# Patient Record
Sex: MALE | Race: WHITE | NOT HISPANIC OR LATINO | Employment: FULL TIME | URBAN - METROPOLITAN AREA
[De-identification: names, ages, dates, MRNs, and addresses within clinical notes are randomized per-mention and may not be internally consistent; named-entity substitution may affect disease eponyms.]

---

## 2017-02-13 DIAGNOSIS — E11.9 NEW ONSET TYPE 2 DIABETES MELLITUS (H): ICD-10-CM

## 2017-02-13 NOTE — TELEPHONE ENCOUNTER
Metformin         Last Written Prescription Date: 10/18/16  Last Fill Quantity: 120, # refills: 3  Last Office Visit with [facility], [facility] or [facility] prescribing provider:  10/19/16        BP Readings from Last 3 Encounters:   10/19/16 130/72   09/06/16 147/87   09/01/16 132/80     No results found for: MICROL  No results found for: MICROALBUMIN  Creatinine   Date Value Ref Range Status   10/19/2016 0.98 0.66 - 1.25 mg/dL Final   ]  GFR Estimate   Date Value Ref Range Status   10/19/2016 86 >60 mL/min/1.7m2 Final     Comment:     Non  GFR Calc   08/23/2016 >90  Non  GFR Calc   >60 mL/min/1.7m2 Final   08/23/2016 >90  Non  GFR Calc   >60 mL/min/1.7m2 Final     GFR Estimate If Black   Date Value Ref Range Status   10/19/2016 >90   GFR Calc   >60 mL/min/1.7m2 Final   08/23/2016 >90   GFR Calc   >60 mL/min/1.7m2 Final   08/23/2016 >90   GFR Calc   >60 mL/min/1.7m2 Final     Lab Results   Component Value Date    CHOL 223 10/19/2016     Lab Results   Component Value Date    HDL 40 10/19/2016     Lab Results   Component Value Date     10/19/2016     Lab Results   Component Value Date    TRIG 245 10/19/2016     Lab Results   Component Value Date    CHOLHDLRATIO 6.0 04/08/2008     No results found for: AST  No results found for: ALT  Lab Results   Component Value Date    A1C 6.0 10/19/2016    A1C 11.6 08/16/2016     Potassium   Date Value Ref Range Status   10/19/2016 4.0 3.4 - 5.3 mmol/L Final     Alexandria Aaron MA 2/13/2017

## 2017-02-16 RX ORDER — METFORMIN HCL 500 MG
2000 TABLET, EXTENDED RELEASE 24 HR ORAL
Qty: 120 TABLET | Refills: 2 | Status: SHIPPED
Start: 2017-02-16 | End: 2017-08-15

## 2017-02-16 NOTE — TELEPHONE ENCOUNTER
Prescription approved per [facility] Refill Protocol.. Due for erika and Lipid, Hgb A1C in April. Notified per comment section of RX. Future labs ordered..................GERBER Calvillo

## 2017-03-07 ENCOUNTER — TRANSFERRED RECORDS (OUTPATIENT)
Dept: HEALTH INFORMATION MANAGEMENT | Facility: CLINIC | Age: 37
End: 2017-03-07

## 2017-08-15 DIAGNOSIS — E11.9 NEW ONSET TYPE 2 DIABETES MELLITUS (H): ICD-10-CM

## 2017-08-15 NOTE — TELEPHONE ENCOUNTER
Metformin         Last Written Prescription Date: 2/16/17  Last Fill Quantity: 120, # refills: 2  Last Office Visit with [facility], [facility] or [facility] prescribing provider:  10/19/16        BP Readings from Last 3 Encounters:   10/19/16 130/72   09/06/16 147/87   09/01/16 132/80     No results found for: MICROL  No results found for: UMALCR  Creatinine   Date Value Ref Range Status   10/19/2016 0.98 0.66 - 1.25 mg/dL Final   ]  GFR Estimate   Date Value Ref Range Status   10/19/2016 86 >60 mL/min/1.7m2 Final     Comment:     Non  GFR Calc   08/23/2016 >90  Non  GFR Calc   >60 mL/min/1.7m2 Final   08/23/2016 >90  Non  GFR Calc   >60 mL/min/1.7m2 Final     GFR Estimate If Black   Date Value Ref Range Status   10/19/2016 >90   GFR Calc   >60 mL/min/1.7m2 Final   08/23/2016 >90   GFR Calc   >60 mL/min/1.7m2 Final   08/23/2016 >90   GFR Calc   >60 mL/min/1.7m2 Final     Lab Results   Component Value Date    CHOL 223 10/19/2016     Lab Results   Component Value Date    HDL 40 10/19/2016     Lab Results   Component Value Date     10/19/2016     Lab Results   Component Value Date    TRIG 245 10/19/2016     Lab Results   Component Value Date    CHOLHDLRATIO 6.0 04/08/2008     No results found for: AST  No results found for: ALT  Lab Results   Component Value Date    A1C 6.0 10/19/2016    A1C 11.6 08/16/2016     Potassium   Date Value Ref Range Status   10/19/2016 4.0 3.4 - 5.3 mmol/L Final     Alexandria Aaron MA 8/15/2017

## 2017-08-17 RX ORDER — METFORMIN HCL 500 MG
2000 TABLET, EXTENDED RELEASE 24 HR ORAL
Qty: 120 TABLET | Refills: 2 | Status: SHIPPED | OUTPATIENT
Start: 2017-08-17 | End: 2017-11-14

## 2017-08-17 NOTE — TELEPHONE ENCOUNTER
Prescription approved per [facility] Refill Protocol  Notified needs appt and labs   Future orders are in placed for Hgb A1C, Micro-albumin, TSH, Lipid, Creatinine. .................GERBER Calvillo

## 2018-05-11 ENCOUNTER — OFFICE VISIT (OUTPATIENT)
Dept: FAMILY MEDICINE | Facility: CLINIC | Age: 38
End: 2018-05-11
Payer: COMMERCIAL

## 2018-05-11 VITALS
BODY MASS INDEX: 40.43 KG/M2 | DIASTOLIC BLOOD PRESSURE: 86 MMHG | HEIGHT: 74 IN | RESPIRATION RATE: 16 BRPM | SYSTOLIC BLOOD PRESSURE: 136 MMHG | HEART RATE: 74 BPM | OXYGEN SATURATION: 99 % | WEIGHT: 315 LBS

## 2018-05-11 DIAGNOSIS — E11.9 TYPE 2 DIABETES MELLITUS WITHOUT COMPLICATION, WITHOUT LONG-TERM CURRENT USE OF INSULIN (H): ICD-10-CM

## 2018-05-11 DIAGNOSIS — E66.01 MORBID OBESITY DUE TO EXCESS CALORIES (H): ICD-10-CM

## 2018-05-11 DIAGNOSIS — E78.5 HYPERLIPIDEMIA LDL GOAL <100: ICD-10-CM

## 2018-05-11 DIAGNOSIS — R03.0 ELEVATED BLOOD PRESSURE READING WITHOUT DIAGNOSIS OF HYPERTENSION: Primary | ICD-10-CM

## 2018-05-11 LAB
ANION GAP SERPL CALCULATED.3IONS-SCNC: 9 MMOL/L (ref 3–14)
BUN SERPL-MCNC: 17 MG/DL (ref 7–30)
CALCIUM SERPL-MCNC: 9.2 MG/DL (ref 8.5–10.1)
CHLORIDE SERPL-SCNC: 105 MMOL/L (ref 94–109)
CO2 SERPL-SCNC: 23 MMOL/L (ref 20–32)
CREAT SERPL-MCNC: 0.98 MG/DL (ref 0.66–1.25)
CREAT UR-MCNC: 149 MG/DL
GFR SERPL CREATININE-BSD FRML MDRD: 85 ML/MIN/1.7M2
GLUCOSE BLD-MCNC: 202 MG/DL (ref 70–99)
GLUCOSE SERPL-MCNC: 226 MG/DL (ref 70–99)
HBA1C MFR BLD: 9.4 % (ref 0–5.6)
MICROALBUMIN UR-MCNC: 296 MG/L
MICROALBUMIN/CREAT UR: 198.66 MG/G CR (ref 0–17)
POTASSIUM SERPL-SCNC: 4.2 MMOL/L (ref 3.4–5.3)
SODIUM SERPL-SCNC: 137 MMOL/L (ref 133–144)
TSH SERPL DL<=0.005 MIU/L-ACNC: 1.97 MU/L (ref 0.4–4)

## 2018-05-11 PROCEDURE — 99207 C FOOT EXAM  NO CHARGE: CPT | Performed by: PHYSICIAN ASSISTANT

## 2018-05-11 PROCEDURE — 83036 HEMOGLOBIN GLYCOSYLATED A1C: CPT | Performed by: PHYSICIAN ASSISTANT

## 2018-05-11 PROCEDURE — 36415 COLL VENOUS BLD VENIPUNCTURE: CPT | Performed by: PHYSICIAN ASSISTANT

## 2018-05-11 PROCEDURE — 80048 BASIC METABOLIC PNL TOTAL CA: CPT | Performed by: PHYSICIAN ASSISTANT

## 2018-05-11 PROCEDURE — 99214 OFFICE O/P EST MOD 30 MIN: CPT | Performed by: PHYSICIAN ASSISTANT

## 2018-05-11 PROCEDURE — 82947 ASSAY GLUCOSE BLOOD QUANT: CPT | Mod: 59 | Performed by: PHYSICIAN ASSISTANT

## 2018-05-11 PROCEDURE — 82043 UR ALBUMIN QUANTITATIVE: CPT | Performed by: PHYSICIAN ASSISTANT

## 2018-05-11 PROCEDURE — 84443 ASSAY THYROID STIM HORMONE: CPT | Performed by: PHYSICIAN ASSISTANT

## 2018-05-11 RX ORDER — METFORMIN HCL 500 MG
TABLET, EXTENDED RELEASE 24 HR ORAL
Qty: 360 TABLET | Refills: 1 | Status: SHIPPED | OUTPATIENT
Start: 2018-05-11 | End: 2018-11-01

## 2018-05-11 RX ORDER — ATORVASTATIN CALCIUM 20 MG/1
20 TABLET, FILM COATED ORAL DAILY
Qty: 90 TABLET | Refills: 1 | Status: SHIPPED | OUTPATIENT
Start: 2018-05-11 | End: 2018-11-01

## 2018-05-11 RX ORDER — LISINOPRIL 10 MG/1
10 TABLET ORAL DAILY
Qty: 30 TABLET | Refills: 1 | Status: SHIPPED | OUTPATIENT
Start: 2018-05-11 | End: 2018-07-12

## 2018-05-11 NOTE — LETTER
May 14, 2018      Geoffrey Wilson  [address] [address]        Dear ,    We are writing to inform you of your test results.    Your kidney function remains stable. Your thyroid function is normal. Make the changes/additions that we discussed and lets get together again in 3 mos.     Resulted Orders   Basic metabolic panel  (Ca, Cl, CO2, Creat, Gluc, K, Na, BUN)   Result Value Ref Range    Sodium 137 133 - 144 mmol/L    Potassium 4.2 3.4 - 5.3 mmol/L    Chloride 105 94 - 109 mmol/L    Carbon Dioxide 23 20 - 32 mmol/L    Anion Gap 9 3 - 14 mmol/L    Glucose 226 (H) 70 - 99 mg/dL      Comment:      Non Fasting    Urea Nitrogen 17 7 - 30 mg/dL    Creatinine 0.98 0.66 - 1.25 mg/dL    GFR Estimate 85 >60 mL/min/1.7m2      Comment:      Non  GFR Calc    GFR Estimate If Black >90 >60 mL/min/1.7m2      Comment:       GFR Calc    Calcium 9.2 8.5 - 10.1 mg/dL   Hemoglobin A1c   Result Value Ref Range    Hemoglobin A1C 9.4 (H) 0 - 5.6 %      Comment:      Normal <5.7% Prediabetes 5.7-6.4%  Diabetes 6.5% or higher - adopted from ADA   consensus guidelines.     Albumin Random Urine Quantitative with Creat Ratio   Result Value Ref Range    Creatinine Urine 149 mg/dL    Albumin Urine mg/L 296 mg/L    Albumin Urine mg/g Cr 198.66 (H) 0 - 17 mg/g Cr   TSH   Result Value Ref Range    TSH 1.97 0.40 - 4.00 mU/L   Glucose, whole blood   Result Value Ref Range    Glucose Whole Blood 202 (H) 70 - 99 mg/dL       If you have any questions or concerns, please call the clinic at the number listed above.       Sincerely,        David Deutsch PA-C/marcelina

## 2018-05-11 NOTE — PROGRESS NOTES
SUBJECTIVE:   Geoffrey Wilson is a 37 year old male who presents to clinic today for the following health issues:      Diabetes Follow-up      Patient is checking blood sugars: not at all    Diabetic concerns: other - stopped metformin January 2018     Symptoms of hypoglycemia (low blood sugar): none     Paresthesias (numbness or burning in feet) or sores: No     Date of last diabetic eye exam: 2018  Has been without metformin x 5 mos.   Hyperlipidemia Follow-Up      Rate your low fat/cholesterol diet?: not monitoring fat    Taking statin?  No    Other lipid medications/supplements?:  none    Hypertension Follow-up      Outpatient blood pressures are not being checked.    Low Salt Diet: not monitoring salt    BP Readings from Last 2 Encounters:   05/11/18 136/86   10/19/16 130/72     Hemoglobin A1C (%)   Date Value   10/19/2016 6.0   08/16/2016 11.6 (H)     LDL Cholesterol Calculated (mg/dL)   Date Value   10/19/2016 134 (H)   04/08/2008 130 (H)       Amount of exercise or physical activity: None    Problems taking medications regularly: yes stopped metformin January 2018    Medication side effects: not applicable    Diet: regular (no restrictions)            Problem list and histories reviewed & adjusted, as indicated.  Additional history: as documented        Reviewed and updated as needed this visit by clinical staff  Tobacco  Allergies  Meds  Soc Hx      Reviewed and updated as needed this visit by Provider         All other systems negative except as outline above  OBJECTIVE:  Eye exam - right eye normal lid, conjunctiva, cornea, pupil and fundus, left eye normal lid, conjunctiva, cornea, pupil and fundus.  Thyroid not palpable, not enlarged, no nodules detected.  CHEST:chest clear to IPPA, no tachypnea, retractions or cyanosis and S1, S2 normal, no murmur, no gallop, rate regular.  Foot exam - both sides normal; no swelling, tenderness or skin or vascular lesions. Color and temperature is normal. Sensation  is intact. Peripheral pulses are palpable. Toenails are normal.    Geoffrey was seen today for diabetes.    Diagnoses and all orders for this visit:    Elevated blood pressure reading without diagnosis of hypertension  -     Basic metabolic panel  (Ca, Cl, CO2, Creat, Gluc, K, Na, BUN)  -     lisinopril (PRINIVIL/ZESTRIL) 10 MG tablet; Take 1 tablet (10 mg) by mouth daily    Morbid obesity due to excess calories (H)    Hyperlipidemia LDL goal <100  -     atorvastatin (LIPITOR) 20 MG tablet; Take 1 tablet (20 mg) by mouth daily    Type 2 diabetes mellitus without complication, without long-term current use of insulin (H)  -     atorvastatin (LIPITOR) 20 MG tablet; Take 1 tablet (20 mg) by mouth daily    Other orders  -     Hemoglobin A1c  -     Albumin Random Urine Quantitative with Creat Ratio  -     FOOT EXAM  -     TSH  -     Glucose, whole blood  -     metFORMIN (GLUCOPHAGE-XR) 500 MG 24 hr tablet; TAKE 4 TABLETS BY MOUTH DAILY WITH DINNER      work on lifestyle modification  Recheck in 3 mos

## 2018-05-11 NOTE — MR AVS SNAPSHOT
"              After Visit Summary   2018    Geoffrey Wilson    MRN: [id number]           Patient Information     Date Of Birth          1980        Visit Information        Provider Department      2018 4:20 PM David Deutsch PA-C [facility]        Today's Diagnoses     Elevated blood pressure reading without diagnosis of hypertension    -  1    Morbid obesity due to excess calories (H)        Hyperlipidemia LDL goal <100        Type 2 diabetes mellitus without complication, without long-term current use of insulin (H)           Follow-ups after your visit        Who to contact     Normal or non-critical lab and imaging results will be communicated to you by [employer], letter or phone within 4 business days after the clinic has received the results. If you do not hear from us within 7 days, please contact the clinic through [facility] or phone. If you have a critical or abnormal lab result, we will notify you by phone as soon as possible.  Submit refill requests through [email or web address] or call your pharmacy and they will forward the refill request to us. Please allow 3 business days for your refill to be completed.          If you need to speak with a  for additional information , please call: [phone]             Additional Information About Your Visit        [email or web address] Information     [email or web address] lets you send messages to your doctor, view your test results, renew your prescriptions, schedule appointments and more. To sign up, go to [email or web address] . Click on \"Log in\" on the left side of the screen, which will take you to the Welcome page. Then click on \"Sign up Now\" on the right side of the page.     You will be asked to enter the access code listed below, as well as some personal information. Please follow the directions to create your username and password.     Your access code is: [email or web address]  Expires: 2018  4:56 PM     Your access code will  in 90 days. If " "you need help or a new code, please call your [facility] clinic or [phone].        Care EveryWhere ID     This is your Care EveryWhere ID. This could be used by other organizations to access your [facility] medical records  [id number]        Your Vitals Were     Pulse Respirations Height Pulse Oximetry BMI (Body Mass Index)       74 16 6' 2\" (1.88 m) 99% 44.94 kg/m2        Blood Pressure from Last 3 Encounters:   05/11/18 136/86   10/19/16 130/72   09/06/16 147/87    Weight from Last 3 Encounters:   05/11/18 (!) 350 lb (158.8 kg)   10/19/16 (!) 335 lb (152 kg)   09/01/16 (!) 332 lb 4.8 oz (150.7 kg)              We Performed the Following     Albumin Random Urine Quantitative with Creat Ratio     Basic metabolic panel  (Ca, Cl, CO2, Creat, Gluc, K, Na, BUN)     FOOT EXAM     Glucose, whole blood     Hemoglobin A1c     TSH          Today's Medication Changes          These changes are accurate as of 5/11/18  4:56 PM.  If you have any questions, ask your nurse or doctor.               Start taking these medicines.        Dose/Directions    atorvastatin 20 MG tablet   Commonly known as:  LIPITOR   Used for:  Hyperlipidemia LDL goal <100, Type 2 diabetes mellitus without complication, without long-term current use of insulin (H)   Started by:  David Deutsch PA-C        Dose:  20 mg   Take 1 tablet (20 mg) by mouth daily   Quantity:  90 tablet   Refills:  1       lisinopril 10 MG tablet   Commonly known as:  PRINIVIL/ZESTRIL   Used for:  Elevated blood pressure reading without diagnosis of hypertension   Started by:  David Deutsch PA-C        Dose:  10 mg   Take 1 tablet (10 mg) by mouth daily   Quantity:  30 tablet   Refills:  1         These medicines have changed or have updated prescriptions.        Dose/Directions    metFORMIN 500 MG 24 hr tablet   Commonly known as:  GLUCOPHAGE-XR   This may have changed:  See the new instructions.   Changed by:  David Deutsch PA-C        TAKE 4 TABLETS BY MOUTH " DAILY WITH DINNER   Quantity:  360 tablet   Refills:  1         Stop taking these medicines if you haven't already. Please contact your care team if you have questions.     glipiZIDE 5 MG 24 hr tablet   Commonly known as:  glipiZIDE XL   Stopped by:  David Deutsch PA-C           glipiZIDE 5 MG tablet   Commonly known as:  GLUCOTROL   Stopped by:  David Deutsch PA-C                Where to get your medicines      These medications were sent to [facility] [id number] [place] [address] [address] [facility] [address] [facility] [address]  [address] [address] [address]     Phone:  [phone]     atorvastatin 20 MG tablet    lisinopril 10 MG tablet    metFORMIN 500 MG 24 hr tablet                Primary Care Provider Office Phone # Fax #    [facility] [phone] [address] [address]        Equal Access to Services     KAREN MEDRANO : Hadii alison ku hadasho Soomaali, waaxda luqadaha, qaybta kaalmada adeegyada, waxay idiin hayaan albert alarcon . So [facility] [phone].    ATENCIÓN: Si habla español, tiene a morales disposición servicios gratuitos de asistencia lingüística. Llame al [phone].    We comply with applicable federal civil rights laws and [place] laws. We do not discriminate on the basis of race, color, national origin, age, disability, sex, sexual orientation, or gender identity.            Thank you!     Thank you for choosing [facility]  for your care. Our goal is always to provide you with excellent care. Hearing back from our patients is one way we can continue to improve our services. Please take a few minutes to complete the written survey that you may receive in the mail after your visit with us. Thank you!             Your Updated Medication List - Protect others around you: Learn how to safely use, store and throw away your medicines at www.disposemymeds.org.          This list is accurate as of 5/11/18  4:56  PM.  Always use your most recent med list.                   Brand Name Dispense Instructions for use Diagnosis    atorvastatin 20 MG tablet    LIPITOR    90 tablet    Take 1 tablet (20 mg) by mouth daily    Hyperlipidemia LDL goal <100, Type 2 diabetes mellitus without complication, without long-term current use of insulin (H)       blood glucose lancets standard    no brand specified    2 Box    Use to test blood sugar 1 times daily or as needed if symptomatic.    New onset type 2 diabetes mellitus (H)       blood glucose monitoring test strip    no brand specified    50 each    Use to test blood sugars 4 times daily and as needed if symptomatic    New onset type 2 diabetes mellitus (H)       lisinopril 10 MG tablet    PRINIVIL/ZESTRIL    30 tablet    Take 1 tablet (10 mg) by mouth daily    Elevated blood pressure reading without diagnosis of hypertension       metFORMIN 500 MG 24 hr tablet    GLUCOPHAGE-XR    360 tablet    TAKE 4 TABLETS BY MOUTH DAILY WITH DINNER        order for DME     1 Device    Equipment being ordered: glucometer to check blood sugars 4 times daily and as needed    New onset type 2 diabetes mellitus (H)

## 2018-07-12 DIAGNOSIS — R03.0 ELEVATED BLOOD PRESSURE READING WITHOUT DIAGNOSIS OF HYPERTENSION: ICD-10-CM

## 2018-07-12 RX ORDER — LISINOPRIL 10 MG/1
TABLET ORAL
Qty: 30 TABLET | Refills: 0 | Status: SHIPPED | OUTPATIENT
Start: 2018-07-12 | End: 2018-08-08

## 2018-07-12 NOTE — TELEPHONE ENCOUNTER
Prescription approved per [facility] Refill Protocol.  1 month supply given, pt due for follow up in August.

## 2018-07-12 NOTE — TELEPHONE ENCOUNTER
"Requested Prescriptions   Pending Prescriptions Disp Refills     lisinopril (PRINIVIL/ZESTRIL) 10 MG tablet [Pharmacy Med Name: LISINOPRIL 10MG TABLETS] 30 tablet 0    Last Written Prescription Date:  06/09/18  Last Fill Quantity: 30,  # refills: 0   Last office visit: 5/11/2018 with prescribing provider:  DEVON Deutsch Future Office Visit:     Sig: TAKE 1 TABLET(10 MG) BY MOUTH DAILY    ACE Inhibitors (Including Combos) Protocol Passed    7/12/2018  8:27 AM       Passed - Blood pressure under 140/90 in past 12 months    BP Readings from Last 3 Encounters:   05/11/18 136/86   10/19/16 130/72   09/06/16 147/87                Passed - Recent (12 mo) or future (30 days) visit within the authorizing provider's specialty    Patient had office visit in the last 12 months or has a visit in the next 30 days with authorizing provider or within the authorizing provider's specialty.  See \"Patient Info\" tab in inbasket, or \"Choose Columns\" in Meds & Orders section of the refill encounter.           Passed - Patient is age 18 or older       Passed - Normal serum creatinine on file in past 12 months    Recent Labs   Lab Test  05/11/18   1621   CR  0.98            Passed - Normal serum potassium on file in past 12 months    Recent Labs   Lab Test  05/11/18   1621   POTASSIUM  4.2               "

## 2018-08-08 DIAGNOSIS — R03.0 ELEVATED BLOOD PRESSURE READING WITHOUT DIAGNOSIS OF HYPERTENSION: ICD-10-CM

## 2018-08-08 NOTE — TELEPHONE ENCOUNTER
"Requested Prescriptions   Pending Prescriptions Disp Refills     lisinopril (PRINIVIL/ZESTRIL) 10 MG tablet [Pharmacy Med Name: LISINOPRIL 10MG TABLETS] 30 tablet 0    Last Written Prescription Date:  7-12-18  Last Fill Quantity: 90,  # refills: 0   Last office visit: 5/11/2018 with prescribing provider:  5-11-18   Future Office Visit:   Next 5 appointments (look out 90 days)     Aug 20, 2018  5:00 PM CDT   [facility]-C   [facility]    [address] [facility] [address] [facility] [address]   [phone]                Sig: TAKE 1 TABLET DAILY    ACE Inhibitors (Including Combos) Protocol Passed    8/8/2018  1:41 PM       Passed - Blood pressure under 140/90 in past 12 months    BP Readings from Last 3 Encounters:   05/11/18 136/86   10/19/16 130/72   09/06/16 147/87                Passed - Recent (12 mo) or future (30 days) visit within the authorizing provider's specialty    Patient had office visit in the last 12 months or has a visit in the next 30 days with authorizing provider or within the authorizing provider's specialty.  See \"Patient Info\" tab in inbasket, or \"Choose Columns\" in Meds & Orders section of the refill encounter.           Passed - Patient is age 18 or older       Passed - Normal serum creatinine on file in past 12 months    Recent Labs   Lab Test  05/11/18   1621   CR  0.98            Passed - Normal serum potassium on file in past 12 months    Recent Labs   Lab Test  05/11/18   1621   POTASSIUM  4.2             "

## 2018-08-09 RX ORDER — LISINOPRIL 10 MG/1
TABLET ORAL
Qty: 30 TABLET | Refills: 0 | Status: SHIPPED | OUTPATIENT
Start: 2018-08-09 | End: 2018-08-20 | Stop reason: SINTOL

## 2018-08-09 NOTE — TELEPHONE ENCOUNTER
Medication is being filled for 1 time refill only due to:  Patient needs to be seen because reminder sent, has appt scheduled..   Oneyda Moreno RN  Patient has appt scheduled

## 2018-08-20 ENCOUNTER — OFFICE VISIT (OUTPATIENT)
Dept: FAMILY MEDICINE | Facility: CLINIC | Age: 38
End: 2018-08-20
Payer: COMMERCIAL

## 2018-08-20 VITALS
HEART RATE: 79 BPM | OXYGEN SATURATION: 96 % | BODY MASS INDEX: 40.43 KG/M2 | HEIGHT: 74 IN | SYSTOLIC BLOOD PRESSURE: 122 MMHG | WEIGHT: 315 LBS | TEMPERATURE: 98 F | DIASTOLIC BLOOD PRESSURE: 77 MMHG

## 2018-08-20 DIAGNOSIS — E11.9 TYPE 2 DIABETES MELLITUS WITHOUT COMPLICATION, WITHOUT LONG-TERM CURRENT USE OF INSULIN (H): ICD-10-CM

## 2018-08-20 DIAGNOSIS — R03.0 ELEVATED BLOOD PRESSURE READING WITHOUT DIAGNOSIS OF HYPERTENSION: Primary | ICD-10-CM

## 2018-08-20 LAB — HBA1C MFR BLD: 8.8 % (ref 0–5.6)

## 2018-08-20 PROCEDURE — 36415 COLL VENOUS BLD VENIPUNCTURE: CPT | Performed by: PHYSICIAN ASSISTANT

## 2018-08-20 PROCEDURE — 99214 OFFICE O/P EST MOD 30 MIN: CPT | Performed by: PHYSICIAN ASSISTANT

## 2018-08-20 PROCEDURE — 83036 HEMOGLOBIN GLYCOSYLATED A1C: CPT | Performed by: PHYSICIAN ASSISTANT

## 2018-08-20 RX ORDER — GLIPIZIDE 10 MG/1
10 TABLET, FILM COATED, EXTENDED RELEASE ORAL DAILY
Qty: 90 TABLET | Refills: 0 | Status: SHIPPED | OUTPATIENT
Start: 2018-08-20 | End: 2018-11-14

## 2018-08-20 RX ORDER — LOSARTAN POTASSIUM 50 MG/1
50 TABLET ORAL DAILY
Qty: 90 TABLET | Refills: 1 | Status: SHIPPED | OUTPATIENT
Start: 2018-08-20 | End: 2018-11-20

## 2018-08-20 NOTE — MR AVS SNAPSHOT
"              After Visit Summary   2018    Geoffrey Wilson    MRN: [id number]           Patient Information     Date Of Birth          1980        Visit Information        Provider Department      2018 5:00 PM David Deutsch PA-C [facility]        Today's Diagnoses     Elevated blood pressure reading without diagnosis of hypertension    -  1    Type 2 diabetes mellitus without complication, without long-term current use of insulin (H)           Follow-ups after your visit        Who to contact     Normal or non-critical lab and imaging results will be communicated to you by Nitronexhart, letter or phone within 4 business days after the clinic has received the results. If you do not hear from us within 7 days, please contact the clinic through Nitronexhart or phone. If you have a critical or abnormal lab result, we will notify you by phone as soon as possible.  Submit refill requests through Playto or call your pharmacy and they will forward the refill request to us. Please allow 3 business days for your refill to be completed.          If you need to speak with a  for additional information , please call: [phone]             Additional Information About Your Visit        [facility] Information     Playto lets you send messages to your doctor, view your test results, renew your prescriptions, schedule appointments and more. To sign up, go to [email or web address] . Click on \"Log in\" on the left side of the screen, which will take you to the Welcome page. Then click on \"Sign up Now\" on the right side of the page.     You will be asked to enter the access code listed below, as well as some personal information. Please follow the directions to create your username and password.     Your access code is: [email or web address]  Expires: 2018  5:31 PM     Your access code will  in 90 days. If you need help or a new code, please call your [place] clinic or [phone].      " "  Care EveryWhere ID     This is your Care EveryWhere ID. This could be used by other organizations to access your [place] medical records  [id number]        Your Vitals Were     Pulse Temperature Height Pulse Oximetry BMI (Body Mass Index)       79 98  F (36.7  C) (Oral) 6' 2\" (1.88 m) 96% 45.71 kg/m2        Blood Pressure from Last 3 Encounters:   08/20/18 122/77   05/11/18 136/86   10/19/16 130/72    Weight from Last 3 Encounters:   08/20/18 (!) 356 lb (161.5 kg)   05/11/18 (!) 350 lb (158.8 kg)   10/19/16 (!) 335 lb (152 kg)              We Performed the Following     Hemoglobin A1c     JUST IN CASE          Today's Medication Changes          These changes are accurate as of 8/20/18  5:31 PM.  If you have any questions, ask your nurse or doctor.               Start taking these medicines.        Dose/Directions    glipiZIDE 10 MG 24 hr tablet   Commonly known as:  glipiZIDE XL   Used for:  Type 2 diabetes mellitus without complication, without long-term current use of insulin (H)   Started by:  David Deutsch PA-C        Dose:  10 mg   Take 1 tablet (10 mg) by mouth daily   Quantity:  90 tablet   Refills:  0       losartan 50 MG tablet   Commonly known as:  COZAAR   Used for:  Elevated blood pressure reading without diagnosis of hypertension   Started by:  David Deutsch PA-C        Dose:  50 mg   Take 1 tablet (50 mg) by mouth daily   Quantity:  90 tablet   Refills:  1         Stop taking these medicines if you haven't already. Please contact your care team if you have questions.     lisinopril 10 MG tablet   Commonly known as:  PRINIVIL/ZESTRIL   Stopped by:  David Deutsch PA-C                Where to get your medicines      These medications were sent to [facility] [address] [place] [address] REJI ROSS - [address] [facility] [address]  [address] [facility] [address]     Phone:  [phone]     glipiZIDE 10 MG 24 hr tablet    losartan 50 MG tablet                Primary " Care Provider Office Phone # Fax #    [facility] [phone] [address] [facility] [address]        Equal Access to Services     KAREN MEDRANO : Hadii aad ku hadericao Sokatiaali, waruizda luqadaha, qaybta kaalmada kings, alayna bhattjose daniel becerrilchance cruz agnes wagner. So [facility] [phone].    ATENCIÓN: Si habla español, tiene a morales disposición servicios gratuitos de asistencia lingüística. Llame al [phone].    We comply with applicable federal civil rights laws and [place] laws. We do not discriminate on the basis of race, color, national origin, age, disability, sex, sexual orientation, or gender identity.            Thank you!     Thank you for choosing [facility]  for your care. Our goal is always to provide you with excellent care. Hearing back from our patients is one way we can continue to improve our services. Please take a few minutes to complete the written survey that you may receive in the mail after your visit with us. Thank you!             Your Updated Medication List - Protect others around you: Learn how to safely use, store and throw away your medicines at www.disposemymeds.org.          This list is accurate as of 8/20/18  5:31 PM.  Always use your most recent med list.                   Brand Name Dispense Instructions for use Diagnosis    atorvastatin 20 MG tablet    LIPITOR    90 tablet    Take 1 tablet (20 mg) by mouth daily    Hyperlipidemia LDL goal <100, Type 2 diabetes mellitus without complication, without long-term current use of insulin (H)       blood glucose lancets standard    no brand specified    2 Box    Use to test blood sugar 1 times daily or as needed if symptomatic.    New onset type 2 diabetes mellitus (H)       blood glucose monitoring test strip    no brand specified    50 each    Use to test blood sugars 4 times daily and as needed if symptomatic    New onset type 2 diabetes mellitus (H)       glipiZIDE 10 MG 24 hr  tablet    glipiZIDE XL    90 tablet    Take 1 tablet (10 mg) by mouth daily    Type 2 diabetes mellitus without complication, without long-term current use of insulin (H)       losartan 50 MG tablet    COZAAR    90 tablet    Take 1 tablet (50 mg) by mouth daily    Elevated blood pressure reading without diagnosis of hypertension       metFORMIN 500 MG 24 hr tablet    GLUCOPHAGE-XR    360 tablet    TAKE 4 TABLETS BY MOUTH DAILY WITH DINNER        order for DME     1 Device    Equipment being ordered: glucometer to check blood sugars 4 times daily and as needed    New onset type 2 diabetes mellitus (H)

## 2018-08-20 NOTE — PROGRESS NOTES
SUBJECTIVE:   Geoffrey Wilson is a 38 year old male who presents to clinic today for the following health issues:      Diabetes Follow-up      Patient is checking blood sugars: not at all    Diabetic concerns: None     Symptoms of hypoglycemia (low blood sugar): none     Paresthesias (numbness or burning in feet) or sores: No     Date of last diabetic eye exam: 1 year ago    BP Readings from Last 2 Encounters:   08/20/18 122/77   05/11/18 136/86     Hemoglobin A1C (%)   Date Value   05/11/2018 9.4 (H)   10/19/2016 6.0     LDL Cholesterol Calculated (mg/dL)   Date Value   10/19/2016 134 (H)   04/08/2008 130 (H)       Diabetes Management Resources  Hyperlipidemia Follow-Up      Rate your low fat/cholesterol diet?: fair    Taking statin?  No    Other lipid medications/supplements?:  none      Amount of exercise or physical activity: 4-5 days/week for an average of 15-30 minutes    Problems taking medications regularly: No    Medication side effects: cough with lisinopril     Diet: regular (no restrictions)            Problem list and histories reviewed & adjusted, as indicated.  Additional history: as documented    Patient Active Problem List   Diagnosis     Cellulitis-scrotum     Morbid obesity due to excess calories (H)     SIRS (systemic inflammatory response syndrome) (H)     Scrotal infection     Elevated blood pressure reading without diagnosis of hypertension     Hyperlipidemia LDL goal <100     Type 2 diabetes mellitus without complication, without long-term current use of insulin (H)     Past Surgical History:   Procedure Laterality Date     HC REPAIR ING HERNIA,5+Y/O,REDUCIBL  Age 6       Social History   Substance Use Topics     Smoking status: Never Smoker     Smokeless tobacco: Never Used     Alcohol use 0.0 oz/week     0 Standard drinks or equivalent per week      Comment: 2 times/ month     Family History   Problem Relation Age of Onset     Diabetes Father      Lipids Father      Hypertension Mother       HEART DISEASE Brother          Current Outpatient Prescriptions   Medication Sig Dispense Refill     atorvastatin (LIPITOR) 20 MG tablet Take 1 tablet (20 mg) by mouth daily 90 tablet 1     blood glucose (NO BRAND SPECIFIED) lancets standard Use to test blood sugar 1 times daily or as needed if symptomatic. 2 Box 0     blood glucose monitoring (NO BRAND SPECIFIED) test strip Use to test blood sugars 4 times daily and as needed if symptomatic 50 each 1     lisinopril (PRINIVIL/ZESTRIL) 10 MG tablet TAKE 1 TABLET DAILY 30 tablet 0     metFORMIN (GLUCOPHAGE-XR) 500 MG 24 hr tablet TAKE 4 TABLETS BY MOUTH DAILY WITH DINNER 360 tablet 1     order for DME Equipment being ordered: glucometer to check blood sugars 4 times daily and as needed 1 Device 0     Allergies   Allergen Reactions     Penicillins Rash     Recent Labs   Lab Test  08/20/18   1659  05/11/18   1621  10/19/16   1134   A1C  8.8*  9.4*  6.0   LDL   --    --   134*   HDL   --    --   40   TRIG   --    --   245*   CR   --   0.98  0.98   GFRESTIMATED   --   85  86   GFRESTBLACK   --   >90  >90  African American GFR Calc     POTASSIUM   --   4.2  4.0   TSH   --   1.97   --       BP Readings from Last 3 Encounters:   08/20/18 122/77   05/11/18 136/86   10/19/16 130/72    Wt Readings from Last 3 Encounters:   08/20/18 (!) 356 lb (161.5 kg)   05/11/18 (!) 350 lb (158.8 kg)   10/19/16 (!) 335 lb (152 kg)                  Labs reviewed in EPIC    Reviewed and updated as needed this visit by clinical staff  Tobacco  Meds  Med Hx  Surg Hx  Fam Hx  Soc Hx      Reviewed and updated as needed this visit by Provider         All other systems negative except as outline above  OBJECTIVE:  Eye exam - right eye normal lid, conjunctiva, cornea, pupil and fundus, left eye normal lid, conjunctiva, cornea, pupil and fundus.  Thyroid not palpable, not enlarged, no nodules detected.  CHEST:chest clear to IPPA, no tachypnea, retractions or cyanosis and S1, S2 normal, no  murmur, no gallop, rate regular.  Foot exam - both sides normal; no swelling, tenderness or skin or vascular lesions. Color and temperature is normal. Sensation is intact. Peripheral pulses are palpable. Toenails are normal.    Geoffrey was seen today for diabetes.    Diagnoses and all orders for this visit:    Elevated blood pressure reading without diagnosis of hypertension  -     losartan (COZAAR) 50 MG tablet; Take 1 tablet (50 mg) by mouth daily    Type 2 diabetes mellitus without complication, without long-term current use of insulin (H)  -     Hemoglobin A1c  -     JUST IN CASE  -     glipiZIDE (GLUCOTROL XL) 10 MG 24 hr tablet; Take 1 tablet (10 mg) by mouth daily      work on lifestyle modification  Recheck in 3 mos

## 2018-09-14 ENCOUNTER — TRANSFERRED RECORDS (OUTPATIENT)
Dept: HEALTH INFORMATION MANAGEMENT | Facility: CLINIC | Age: 38
End: 2018-09-14

## 2018-09-18 ENCOUNTER — TRANSFERRED RECORDS (OUTPATIENT)
Dept: HEALTH INFORMATION MANAGEMENT | Facility: CLINIC | Age: 38
End: 2018-09-18

## 2018-09-18 LAB
CREAT SERPL-MCNC: 0.95 MG/DL (ref 0.72–1.25)
GFR SERPL CREATININE-BSD FRML MDRD: >60 ML/MIN/1.73M2
GLUCOSE SERPL-MCNC: 128 MG/DL (ref 65–100)
POTASSIUM SERPL-SCNC: 4 MMOL/L (ref 3.5–5)

## 2018-09-21 ENCOUNTER — OFFICE VISIT (OUTPATIENT)
Dept: FAMILY MEDICINE | Facility: CLINIC | Age: 38
End: 2018-09-21
Payer: COMMERCIAL

## 2018-09-21 VITALS
WEIGHT: 315 LBS | RESPIRATION RATE: 16 BRPM | OXYGEN SATURATION: 96 % | HEART RATE: 85 BPM | SYSTOLIC BLOOD PRESSURE: 133 MMHG | TEMPERATURE: 98.1 F | DIASTOLIC BLOOD PRESSURE: 86 MMHG | BODY MASS INDEX: 46.22 KG/M2

## 2018-09-21 DIAGNOSIS — I31.39 PERICARDIAL EFFUSION: Primary | ICD-10-CM

## 2018-09-21 PROCEDURE — 99214 OFFICE O/P EST MOD 30 MIN: CPT | Performed by: PHYSICIAN ASSISTANT

## 2018-09-21 NOTE — MR AVS SNAPSHOT
"              After Visit Summary   2018    Geoffrey Wilson    MRN: [id number]           Patient Information     Date Of Birth          1980        Visit Information        Provider Department      2018 3:00 PM David Deutsch PA-C [facility]        Today's Diagnoses     Pericardial effusion    -  1       Follow-ups after your visit        Who to contact     Normal or non-critical lab and imaging results will be communicated to you by EntomoPharmhart, letter or phone within 4 business days after the clinic has received the results. If you do not hear from us within 7 days, please contact the clinic through MyChart or phone. If you have a critical or abnormal lab result, we will notify you by phone as soon as possible.  Submit refill requests through Gaming Live TV or call your pharmacy and they will forward the refill request to us. Please allow 3 business days for your refill to be completed.          If you need to speak with a  for additional information , please call: [phone]             Additional Information About Your Visit        Gaming Live TV Information     Gaming Live TV lets you send messages to your doctor, view your test results, renew your prescriptions, schedule appointments and more. To sign up, go to [email or web address] . Click on \"Log in\" on the left side of the screen, which will take you to the Welcome page. Then click on \"Sign up Now\" on the right side of the page.     You will be asked to enter the access code listed below, as well as some personal information. Please follow the directions to create your username and password.     Your access code is: [email or web address]  Expires: 2018  5:31 PM     Your access code will  in 90 days. If you need help or a new code, please call your [place] clinic or [phone].        Care EveryWhere ID     This is your Care EveryWhere ID. This could be used by other organizations to access your [place] medical " records  [id number]        Your Vitals Were     Pulse Temperature Respirations Pulse Oximetry BMI (Body Mass Index)       85 98.1  F (36.7  C) (Tympanic) 16 96% 46.22 kg/m2        Blood Pressure from Last 3 Encounters:   09/21/18 133/86   08/20/18 122/77   05/11/18 136/86    Weight from Last 3 Encounters:   09/21/18 (!) 360 lb (163.3 kg)   08/20/18 (!) 356 lb (161.5 kg)   05/11/18 (!) 350 lb (158.8 kg)              Today, you had the following     No orders found for display       Primary Care Provider Office Phone # Fax #    [facility] [phone] [address] [facility] [address]        Equal Access to Services     KAREN MEDRANO : Nena de la rosa Soeric, waaxda luqadaha, qaybta kaalmada adeegyacameron, alayna alarcon . So [facility] [phone].    ATENCIÓN: Si habla español, tiene a morales disposición servicios gratuitos de asistencia lingüística. Yony al [phone].    We comply with applicable federal civil rights laws and [place] laws. We do not discriminate on the basis of race, color, national origin, age, disability, sex, sexual orientation, or gender identity.            Thank you!     Thank you for choosing [facility]  for your care. Our goal is always to provide you with excellent care. Hearing back from our patients is one way we can continue to improve our services. Please take a few minutes to complete the written survey that you may receive in the mail after your visit with us. Thank you!             Your Updated Medication List - Protect others around you: Learn how to safely use, store and throw away your medicines at www.disposemymeds.org.          This list is accurate as of 9/21/18  3:32 PM.  Always use your most recent med list.                   Brand Name Dispense Instructions for use Diagnosis    atorvastatin 20 MG tablet    LIPITOR    90 tablet    Take 1 tablet (20 mg) by mouth daily    Hyperlipidemia LDL goal  <100, Type 2 diabetes mellitus without complication, without long-term current use of insulin (H)       blood glucose lancets standard    no brand specified    2 Box    Use to test blood sugar 1 times daily or as needed if symptomatic.    New onset type 2 diabetes mellitus (H)       blood glucose monitoring test strip    no brand specified    50 each    Use to test blood sugars 4 times daily and as needed if symptomatic    New onset type 2 diabetes mellitus (H)       glipiZIDE 10 MG 24 hr tablet    glipiZIDE XL    90 tablet    Take 1 tablet (10 mg) by mouth daily    Type 2 diabetes mellitus without complication, without long-term current use of insulin (H)       losartan 50 MG tablet    COZAAR    90 tablet    Take 1 tablet (50 mg) by mouth daily    Elevated blood pressure reading without diagnosis of hypertension       metFORMIN 500 MG 24 hr tablet    GLUCOPHAGE-XR    360 tablet    TAKE 4 TABLETS BY MOUTH DAILY WITH DINNER        order for DME     1 Device    Equipment being ordered: glucometer to check blood sugars 4 times daily and as needed    New onset type 2 diabetes mellitus (H)

## 2018-09-21 NOTE — PROGRESS NOTES
SUBJECTIVE:   Geoffrey Wilson is a 38 year old male who presents to clinic today for the following health issues:      ED/UC Followup:    Facility:  [facility]  Date of visit: 09/18/18  Reason for visit: chest pain---diagnosis--Small circumferential pericardial effusion without tamponade patient does have cardiology appt scheduled 09/25/18  Current Status: chest pain has improved     Dizziness resolved as well.  No sob.  No history of recent chest wall injury.  No fevers.  No fhx of RA and lupus in his family.   Ekg not suggestive of pericarditis.   No noc sweats . No extreme fatigue.   No ischemia noted on his stress test yesterday  pepcid seemed to take away his chest discomfort. Culprit may have been gerd and the pericardial effusion is and incidental finding. Will see what cardiology thinks.  Problem list and histories reviewed & adjusted, as indicated.  Additional history: as documented    BP Readings from Last 3 Encounters:   09/21/18 133/86   08/20/18 122/77   05/11/18 136/86    Wt Readings from Last 3 Encounters:   09/21/18 (!) 360 lb (163.3 kg)   08/20/18 (!) 356 lb (161.5 kg)   05/11/18 (!) 350 lb (158.8 kg)                    Reviewed and updated as needed this visit by clinical staff  Tobacco  Allergies  Meds  Problems  Med Hx  Surg Hx  Fam Hx  Soc Hx        Reviewed and updated as needed this visit by Provider  Tobacco  Allergies  Meds  Problems  Med Hx  Surg Hx  Fam Hx  Soc Hx          All other systems negative except as outline above  Eye exam - right eye normal lid, conjunctiva, cornea, pupil and fundus, left eye normal lid, conjunctiva, cornea, pupil and fundus.  Thyroid not palpable, not enlarged, no nodules detected.  CHEST:chest clear to IPPA, no tachypnea, retractions or cyanosis and S1, S2 normal, no murmur, no gallop, rate regular.  No edema  No abdominal pain     Geoffrey was seen today for hospital f/u.    Diagnoses and all orders for this visit:    Pericardial  effusion      Uncertain etiology at this time.     Advised supportive and symptomatic treatment.  Follow up with Provider - if condition persists or worsens.

## 2018-09-25 ENCOUNTER — TRANSFERRED RECORDS (OUTPATIENT)
Dept: HEALTH INFORMATION MANAGEMENT | Facility: CLINIC | Age: 38
End: 2018-09-25

## 2018-11-01 DIAGNOSIS — E11.9 TYPE 2 DIABETES MELLITUS WITHOUT COMPLICATION, WITHOUT LONG-TERM CURRENT USE OF INSULIN (H): ICD-10-CM

## 2018-11-01 DIAGNOSIS — E78.5 HYPERLIPIDEMIA LDL GOAL <100: ICD-10-CM

## 2018-11-01 NOTE — TELEPHONE ENCOUNTER
"Requested Prescriptions   Pending Prescriptions Disp Refills     metFORMIN (GLUCOPHAGE-XR) 500 MG 24 hr tablet [Pharmacy Med Name: METFORMIN ER 500MG 24HR TABS] 360 tablet 0    Last Written Prescription Date:  8-8-18  Last Fill Quantity: 360,  # refills: 0   Last office visit: 9/21/2018 with prescribing provider:  9-21-18   Future Office Visit:   Sig: TAKE 4 TABLETS BY MOUTH DAILY WITH DINNER    Biguanide Agents Failed    11/1/2018 11:38 AM       Failed - Patient has documented LDL within the past 12 mos.    Recent Labs   Lab Test  10/19/16   1134   LDL  134*            Passed - Blood pressure less than 140/90 in past 6 months    BP Readings from Last 3 Encounters:   09/21/18 133/86   08/20/18 122/77   05/11/18 136/86                Passed - Patient has had a Microalbumin in the past 15 mos.    Recent Labs   Lab Test  05/11/18   1618   MICROL  296   UMALCR  198.66*            Passed - Patient is age 10 or older       Passed - Patient has documented A1c within the specified period of time.    If HgbA1C is 8 or greater, it needs to be on file within the past 3 months.  If less than 8, must be on file within the past 6 months.     Recent Labs   Lab Test  08/20/18   1659   A1C  8.8*            Passed - Patient's CR is NOT>1.4 OR Patient's EGFR is NOT<45 within past 12 mos.    Recent Labs   Lab Test 09/18/18   GFRESTIMATED  >60   GFRESTBLACK  >60       Recent Labs   Lab Test 09/18/18   CR  0.95            Passed - Patient does NOT have a diagnosis of CHF.       Passed - Recent (6 mo) or future (30 days) visit within the authorizing provider's specialty    Patient had office visit in the last 6 months or has a visit in the next 30 days with authorizing provider or within the authorizing provider's specialty.  See \"Patient Info\" tab in inbasket, or \"Choose Columns\" in Meds & Orders section of the refill encounter.            atorvastatin (LIPITOR) 20 MG tablet [Pharmacy Med Name: ATORVASTATIN 20MG TABLETS] 90 tablet 0    " "Last Written Prescription Date:  8-8-18  Last Fill Quantity: 90,  # refills: 0   Last office visit: 9/21/2018 with prescribing provider:  9-21-18   Future Office Visit:   Sig: TAKE 1 TABLET(20 MG) BY MOUTH DAILY    Statins Protocol Failed    11/1/2018 11:38 AM       Failed - LDL on file in past 12 months    Recent Labs   Lab Test  10/19/16   1134   LDL  134*            Passed - No abnormal creatine kinase in past 12 months    No lab results found.            Passed - Recent (12 mo) or future (30 days) visit within the authorizing provider's specialty    Patient had office visit in the last 12 months or has a visit in the next 30 days with authorizing provider or within the authorizing provider's specialty.  See \"Patient Info\" tab in inbasket, or \"Choose Columns\" in Meds & Orders section of the refill encounter.             Passed - Patient is age 18 or older        "

## 2018-11-02 NOTE — TELEPHONE ENCOUNTER
Routing refill request to provider for review/approval because:  Patient needs to be seen because:  Due for Diabetes follow up, labs out of range and not current.  Pended for 1 month with reminder appt due

## 2018-11-05 RX ORDER — METFORMIN HCL 500 MG
TABLET, EXTENDED RELEASE 24 HR ORAL
Qty: 120 TABLET | Refills: 0 | Status: SHIPPED | OUTPATIENT
Start: 2018-11-05 | End: 2018-11-20

## 2018-11-05 RX ORDER — ATORVASTATIN CALCIUM 20 MG/1
TABLET, FILM COATED ORAL
Qty: 30 TABLET | Refills: 0 | Status: SHIPPED | OUTPATIENT
Start: 2018-11-05 | End: 2018-11-20

## 2018-11-14 DIAGNOSIS — E11.9 TYPE 2 DIABETES MELLITUS WITHOUT COMPLICATION, WITHOUT LONG-TERM CURRENT USE OF INSULIN (H): ICD-10-CM

## 2018-11-15 RX ORDER — GLIPIZIDE 10 MG/1
TABLET, FILM COATED, EXTENDED RELEASE ORAL
Qty: 30 TABLET | Refills: 0 | Status: SHIPPED | OUTPATIENT
Start: 2018-11-15 | End: 2018-11-20

## 2018-11-15 NOTE — TELEPHONE ENCOUNTER
"Requested Prescriptions   Pending Prescriptions Disp Refills     glipiZIDE (GLUCOTROL XL) 10 MG 24 hr tablet [Pharmacy Med Name: GLIPIZIDE ER 10MG TABLETS] 90 tablet 0    Last Written Prescription Date:  8-20-18  Last Fill Quantity: 90,  # refills: 0   Last office visit: 9/21/2018 with prescribing provider:  9-21-18   Future Office Visit:   Next 5 appointments (look out 90 days)     Nov 20, 2018  3:20 PM CST   [facility] David Deutsch [facility]-C   [facility]    [address] [facility] [id number] [phone]                Sig: TAKE 1 TABLET(10 MG) BY MOUTH DAILY    Sulfonylurea Agents Failed    11/14/2018  7:59 PM       Failed - Patient has documented LDL within the past 12 mos.    Recent Labs   Lab Test  10/19/16   1134   LDL  134*            Passed - Blood pressure less than 140/90 in past 6 months    BP Readings from Last 3 Encounters:   09/21/18 133/86   08/20/18 122/77   05/11/18 136/86                Passed - Patient has had a Microalbumin in the past 12 mos.    Recent Labs   Lab Test  05/11/18   1618   MICROL  296   UMALCR  198.66*            Passed - Patient has documented A1c within the specified period of time.    If HgbA1C is 8 or greater, it needs to be on file within the past 3 months.  If less than 8, must be on file within the past 6 months.     Recent Labs   Lab Test  08/20/18   1659   A1C  8.8*            Passed - Patient is age 18 or older       Passed - Patient has a recent creatinine (normal) within the past 12 mos.    Recent Labs   Lab Test 09/18/18   CR  0.95            Passed - Recent (6 mo) or future (30 days) visit within the authorizing provider's specialty    Patient had office visit in the last 6 months or has a visit in the next 30 days with authorizing provider or within the authorizing provider's specialty.  See \"Patient Info\" tab in inbasket, or \"Choose Columns\" in Meds & Orders section of the refill encounter.            "

## 2018-11-15 NOTE — TELEPHONE ENCOUNTER
Medication is being filled for 1 time refill only due to:  Patient needs to be seen because due for appt and labs, has appt scheduled..   Reminder still sent.  Oneyda Moreno RN

## 2018-11-20 ENCOUNTER — OFFICE VISIT (OUTPATIENT)
Dept: FAMILY MEDICINE | Facility: CLINIC | Age: 38
End: 2018-11-20
Payer: COMMERCIAL

## 2018-11-20 VITALS
TEMPERATURE: 98 F | RESPIRATION RATE: 16 BRPM | DIASTOLIC BLOOD PRESSURE: 84 MMHG | OXYGEN SATURATION: 97 % | WEIGHT: 315 LBS | BODY MASS INDEX: 46.22 KG/M2 | SYSTOLIC BLOOD PRESSURE: 136 MMHG | HEART RATE: 82 BPM

## 2018-11-20 DIAGNOSIS — E78.5 HYPERLIPIDEMIA LDL GOAL <100: ICD-10-CM

## 2018-11-20 DIAGNOSIS — R03.0 ELEVATED BLOOD PRESSURE READING WITHOUT DIAGNOSIS OF HYPERTENSION: ICD-10-CM

## 2018-11-20 DIAGNOSIS — K21.9 GASTROESOPHAGEAL REFLUX DISEASE WITHOUT ESOPHAGITIS: Primary | ICD-10-CM

## 2018-11-20 DIAGNOSIS — E11.29 TYPE 2 DIABETES MELLITUS WITH MICROALBUMINURIA, WITHOUT LONG-TERM CURRENT USE OF INSULIN (H): ICD-10-CM

## 2018-11-20 DIAGNOSIS — R80.9 TYPE 2 DIABETES MELLITUS WITH MICROALBUMINURIA, WITHOUT LONG-TERM CURRENT USE OF INSULIN (H): ICD-10-CM

## 2018-11-20 DIAGNOSIS — B35.4 TINEA CORPORIS: ICD-10-CM

## 2018-11-20 LAB
CHOLEST SERPL-MCNC: 140 MG/DL
GLUCOSE BLD-MCNC: 98 MG/DL (ref 70–99)
HBA1C MFR BLD: 6.2 % (ref 0–5.6)
HDLC SERPL-MCNC: 39 MG/DL
LDLC SERPL CALC-MCNC: 78 MG/DL
NONHDLC SERPL-MCNC: 101 MG/DL
TRIGL SERPL-MCNC: 113 MG/DL

## 2018-11-20 PROCEDURE — 99214 OFFICE O/P EST MOD 30 MIN: CPT | Performed by: PHYSICIAN ASSISTANT

## 2018-11-20 PROCEDURE — 82947 ASSAY GLUCOSE BLOOD QUANT: CPT | Performed by: PHYSICIAN ASSISTANT

## 2018-11-20 PROCEDURE — 83036 HEMOGLOBIN GLYCOSYLATED A1C: CPT | Performed by: PHYSICIAN ASSISTANT

## 2018-11-20 PROCEDURE — 36415 COLL VENOUS BLD VENIPUNCTURE: CPT | Performed by: PHYSICIAN ASSISTANT

## 2018-11-20 PROCEDURE — 80061 LIPID PANEL: CPT | Performed by: PHYSICIAN ASSISTANT

## 2018-11-20 RX ORDER — PRENATAL VIT 91/IRON/FOLIC/DHA 28-975-200
COMBINATION PACKAGE (EA) ORAL 2 TIMES DAILY
Qty: 30 G | Refills: 1 | Status: SHIPPED | OUTPATIENT
Start: 2018-11-20 | End: 2019-07-18

## 2018-11-20 RX ORDER — OMEPRAZOLE 40 MG/1
40 CAPSULE, DELAYED RELEASE ORAL DAILY
Qty: 90 CAPSULE | Refills: 3 | Status: SHIPPED | OUTPATIENT
Start: 2018-11-20 | End: 2019-07-18

## 2018-11-20 RX ORDER — METFORMIN HCL 500 MG
TABLET, EXTENDED RELEASE 24 HR ORAL
Qty: 120 TABLET | Refills: 1 | Status: SHIPPED | OUTPATIENT
Start: 2018-11-20 | End: 2019-02-03

## 2018-11-20 RX ORDER — ATORVASTATIN CALCIUM 20 MG/1
TABLET, FILM COATED ORAL
Qty: 90 TABLET | Refills: 1 | Status: SHIPPED | OUTPATIENT
Start: 2018-11-20 | End: 2020-01-31 | Stop reason: SINTOL

## 2018-11-20 RX ORDER — GLIPIZIDE 10 MG/1
TABLET, FILM COATED, EXTENDED RELEASE ORAL
Qty: 90 TABLET | Refills: 1 | Status: SHIPPED | OUTPATIENT
Start: 2018-11-20 | End: 2019-06-11

## 2018-11-20 RX ORDER — LOSARTAN POTASSIUM 50 MG/1
50 TABLET ORAL DAILY
Qty: 90 TABLET | Refills: 1 | Status: SHIPPED | OUTPATIENT
Start: 2018-11-20 | End: 2019-07-18

## 2018-11-20 NOTE — MR AVS SNAPSHOT
After Visit Summary   11/20/2018    Geoffrey Wilson    MRN: [id number]           Patient Information     Date Of Birth          1980        Visit Information        Provider Department      11/20/2018 3:20 PM David Deutsch PA-C [facility]        Today's Diagnoses     Gastroesophageal reflux disease without esophagitis    -  1    Hyperlipidemia LDL goal <100        Elevated blood pressure reading without diagnosis of hypertension        Type 2 diabetes mellitus with microalbuminuria, without long-term current use of insulin (H)        Tinea corporis           Follow-ups after your visit        Follow-up notes from your care team     Return in about 6 months (around 5/20/2019) for diabetes recheck.      Who to contact     Normal or non-critical lab and imaging results will be communicated to you by MyChart, letter or phone within 4 business days after the clinic has received the results. If you do not hear from us within 7 days, please contact the clinic through MyChart or phone. If you have a critical or abnormal lab result, we will notify you by phone as soon as possible.  Submit refill requests through [facility] or call your pharmacy and they will forward the refill request to us. Please allow 3 business days for your refill to be completed.          If you need to speak with a  for additional information , please call: [phone]             Additional Information About Your Visit        Care EveryWhere ID     This is your Care EveryWhere ID. This could be used by other organizations to access your [place] medical records  [id number]        Your Vitals Were     Pulse Temperature Respirations Pulse Oximetry BMI (Body Mass Index)       82 98  F (36.7  C) (Tympanic) 16 97% 46.22 kg/m2        Blood Pressure from Last 3 Encounters:   11/20/18 136/84   09/21/18 133/86   08/20/18 122/77    Weight from Last 3 Encounters:   11/20/18 (!) 360 lb (163.3 kg)   09/21/18  (!) 360 lb (163.3 kg)   08/20/18 (!) 356 lb (161.5 kg)              We Performed the Following     Glucose, whole blood     Hemoglobin A1c     JUST IN CASE     Lipid panel reflex to direct LDL Fasting          Today's Medication Changes          These changes are accurate as of 11/20/18  3:47 PM.  If you have any questions, ask your nurse or doctor.               Start taking these medicines.        Dose/Directions    omeprazole 40 MG capsule   Commonly known as:  priLOSEC   Used for:  Gastroesophageal reflux disease without esophagitis   Started by:  David Deutsch PA-C        Dose:  40 mg   Take 1 capsule (40 mg) by mouth daily   Quantity:  90 capsule   Refills:  3       ranitidine 300 MG tablet   Commonly known as:  ZANTAC   Used for:  Gastroesophageal reflux disease without esophagitis   Started by:  David Deutsch PA-C        Dose:  300 mg   Take 1 tablet (300 mg) by mouth At Bedtime   Quantity:  30 tablet   Refills:  1       terbinafine 1 % cream   Commonly known as:  lamISIL AT   Used for:  Tinea corporis   Started by:  David Deutsch PA-C        Apply topically 2 times daily For fungal infection not resolved with other antifungals (e.g. Clotrimazole)   Quantity:  30 g   Refills:  1            Where to get your medicines      [facility] [address] [place] [id number] [facility] AT [facility] [address]  [address] [facility] [address]     Phone:  [phone]     atorvastatin 20 MG tablet    glipiZIDE 10 MG 24 hr tablet    losartan 50 MG tablet    metFORMIN 500 MG 24 hr tablet    omeprazole 40 MG capsule    ranitidine 300 MG tablet    terbinafine 1 % cream                Primary Care Provider Office Phone # Fax #    [place] [facility] [phone]       [address] [facility] [address]        Equal Access to Services     KAREN MEDRANO AH: Nena Hoff, natalee luong, alayna condon  surekha becerrilchance burrell'aan ah. So [facility] [phone].    ATENCIÓN: Si delfinola isaias, tiene a morales disposición servicios gratuitos de asistencia lingüística. Yony al [phone].    We comply with applicable federal civil rights laws and [place] laws. We do not discriminate on the basis of race, color, national origin, age, disability, sex, sexual orientation, or gender identity.            Thank you!     Thank you for choosing [facility]  for your care. Our goal is always to provide you with excellent care. Hearing back from our patients is one way we can continue to improve our services. Please take a few minutes to complete the written survey that you may receive in the mail after your visit with us. Thank you!             Your Updated Medication List - Protect others around you: Learn how to safely use, store and throw away your medicines at www.disposemymeds.org.          This list is accurate as of 11/20/18  3:47 PM.  Always use your most recent med list.                   Brand Name Dispense Instructions for use Diagnosis    atorvastatin 20 MG tablet    LIPITOR    90 tablet    TAKE 1 TABLET(20 MG) BY MOUTH DAILY    Hyperlipidemia LDL goal <100       blood glucose lancets standard    no brand specified    2 Box    Use to test blood sugar 1 times daily or as needed if symptomatic.    New onset type 2 diabetes mellitus (H)       blood glucose monitoring test strip    no brand specified    50 each    Use to test blood sugars 4 times daily and as needed if symptomatic    New onset type 2 diabetes mellitus (H)       glipiZIDE 10 MG 24 hr tablet    GLUCOTROL XL    90 tablet    TAKE 1 TABLET(10 MG) BY MOUTH DAILY    Type 2 diabetes mellitus with microalbuminuria, without long-term current use of insulin (H)       losartan 50 MG tablet    COZAAR    90 tablet    Take 1 tablet (50 mg) by mouth daily    Elevated blood pressure reading without diagnosis of hypertension       metFORMIN 500 MG 24 hr  tablet    GLUCOPHAGE-XR    120 tablet    TAKE 4 TABLETS BY MOUTH DAILY WITH DINNER    Type 2 diabetes mellitus with microalbuminuria, without long-term current use of insulin (H)       omeprazole 40 MG capsule    priLOSEC    90 capsule    Take 1 capsule (40 mg) by mouth daily    Gastroesophageal reflux disease without esophagitis       order for DME     1 Device    Equipment being ordered: glucometer to check blood sugars 4 times daily and as needed    New onset type 2 diabetes mellitus (H)       ranitidine 300 MG tablet    ZANTAC    30 tablet    Take 1 tablet (300 mg) by mouth At Bedtime    Gastroesophageal reflux disease without esophagitis       terbinafine 1 % cream    lamISIL AT    30 g    Apply topically 2 times daily For fungal infection not resolved with other antifungals (e.g. Clotrimazole)    Tinea corporis

## 2018-11-20 NOTE — LETTER
November 27, 2018      Geoffreyparth Wilson  [address] [facility] [address]        Geoffrey,     Your cholesterol numbers also came back looking good. Keep up the good work!      Resulted Orders   Hemoglobin A1c   Result Value Ref Range    Hemoglobin A1C 6.2 (H) 0 - 5.6 %      Comment:      Normal <5.7% Prediabetes 5.7-6.4%  Diabetes 6.5% or higher - adopted from ADA   consensus guidelines.     Glucose, whole blood   Result Value Ref Range    Glucose Whole Blood 98 70 - 99 mg/dL   Lipid panel reflex to direct LDL Fasting   Result Value Ref Range    Cholesterol 140 <200 mg/dL    Triglycerides 113 <150 mg/dL      Comment:      Fasting specimen    HDL Cholesterol 39 (L) >39 mg/dL    LDL Cholesterol Calculated 78 <100 mg/dL      Comment:      Desirable:       <100 mg/dl    Non HDL Cholesterol 101 <130 mg/dL       If you have any questions or concerns, please call the clinic at the number listed above.       Sincerely,    David Deutsch PA-C/bere

## 2018-11-20 NOTE — PROGRESS NOTES
SUBJECTIVE:   Geoffrey Wilson is a 38 year old male who presents to clinic today for the following health issues:      Diabetes Follow-up      Patient is checking blood sugars: not at all    Diabetic concerns: None     Symptoms of hypoglycemia (low blood sugar): none     Paresthesias (numbness or burning in feet) or sores: No     Date of last diabetic eye exam: 2017    BP Readings from Last 2 Encounters:   11/20/18 136/84   09/21/18 133/86     Hemoglobin A1C (%)   Date Value   11/20/2018 6.2 (H)   08/20/2018 8.8 (H)     LDL Cholesterol Calculated (mg/dL)   Date Value   10/19/2016 134 (H)   04/08/2008 130 (H)       Diabetes Management Resources    Amount of exercise or physical activity: 2-3 days/week for an average of 45-60 minutes    Problems taking medications regularly: No    Medication side effects: none    Diet: regular (no restrictions)      No polyuria/dipsia. No fatigue . No chest pain/sob/palps       Problem list and histories reviewed & adjusted, as indicated.  Additional history: as documented    BP Readings from Last 3 Encounters:   11/20/18 136/84   09/21/18 133/86   08/20/18 122/77    Wt Readings from Last 3 Encounters:   11/20/18 (!) 360 lb (163.3 kg)   09/21/18 (!) 360 lb (163.3 kg)   08/20/18 (!) 356 lb (161.5 kg)                    Reviewed and updated as needed this visit by clinical staff  Tobacco  Allergies  Meds  Problems  Med Hx  Surg Hx  Fam Hx  Soc Hx        Reviewed and updated as needed this visit by Provider  Tobacco  Allergies  Meds  Problems  Med Hx  Surg Hx  Fam Hx  Soc Hx          All other systems negative except as outline above  OBJECTIVE:  Eye exam - right eye normal lid, conjunctiva, cornea, pupil and fundus, left eye normal lid, conjunctiva, cornea, pupil and fundus.  Thyroid not palpable, not enlarged, no nodules detected.  CHEST:chest clear to IPPA, no tachypnea, retractions or cyanosis and S1, S2 normal, no murmur, no gallop, rate regular.  Foot exam - both  sides normal; no swelling, tenderness or skin or vascular lesions. Color and temperature is normal. Sensation is intact. Peripheral pulses are palpable. Toenails are normal.    Geoffrey was seen today for diabetes.    Diagnoses and all orders for this visit:    Gastroesophageal reflux disease without esophagitis  -     Hemoglobin A1c  -     ranitidine (ZANTAC) 300 MG tablet; Take 1 tablet (300 mg) by mouth At Bedtime  -     omeprazole (PRILOSEC) 40 MG capsule; Take 1 capsule (40 mg) by mouth daily    Hyperlipidemia LDL goal <100  -     atorvastatin (LIPITOR) 20 MG tablet; TAKE 1 TABLET(20 MG) BY MOUTH DAILY  -     Lipid panel reflex to direct LDL Fasting    Elevated blood pressure reading without diagnosis of hypertension  -     losartan (COZAAR) 50 MG tablet; Take 1 tablet (50 mg) by mouth daily    Type 2 diabetes mellitus with microalbuminuria, without long-term current use of insulin (H)  -     glipiZIDE (GLUCOTROL XL) 10 MG 24 hr tablet; TAKE 1 TABLET(10 MG) BY MOUTH DAILY  -     metFORMIN (GLUCOPHAGE-XR) 500 MG 24 hr tablet; TAKE 4 TABLETS BY MOUTH DAILY WITH DINNER    Tinea corporis  -     terbinafine (LAMISIL AT) 1 % cream; Apply topically 2 times daily For fungal infection not resolved with other antifungals (e.g. Clotrimazole)    Other orders  -     Glucose, whole blood  -     JUST IN CASE      work on lifestyle modification  Recheck in 6 mos

## 2019-01-01 ENCOUNTER — NURSE TRIAGE (OUTPATIENT)
Dept: NURSING | Facility: CLINIC | Age: 39
End: 2019-01-01

## 2019-01-01 NOTE — TELEPHONE ENCOUNTER
"I connected the patient with scheduling, for an appointment.  Miesha Fisher RN-[facility]        Reason for Disposition    [1] MODERATE pain (e.g., interferes with normal activities) AND [2] pain comes and goes (cramps) AND [3] present > 24 hours  (Exception: pain with Vomiting or Diarrhea - see that Guideline)    Additional Information    Negative: Shock suspected (e.g., cold/pale/clammy skin, too weak to stand, low BP, rapid pulse)    Negative: Difficult to awaken or acting confused  (e.g., disoriented, slurred speech)    Negative: Passed out (i.e., lost consciousness, collapsed and was not responding)    Negative: Sounds like a life-threatening emergency to the triager    Negative: Chest pain    Negative: Pain is mainly in upper abdomen  (if needed ask: \"is it mainly above the belly button?\")    Negative: Followed an abdomen (stomach) injury    Negative: [1] SEVERE pain (e.g., excruciating) AND [2] present > 1 hour     Pain at 5    Negative: [1] SEVERE pain AND [2] age > 60    Negative: [1] Vomiting AND [2] contains red blood or black (\"coffee ground\") material  (Exception: few red streaks in vomit that only happened once)    Negative: Blood in bowel movements  (Exception: Blood on surface of BM with constipation)    Negative: Black or tarry bowel movements  (Exception: chronic-unchanged  black-grey bowel movements AND is taking iron pills or Pepto-bismol)    Negative: [1] Unable to urinate (or only a few drops) > 4 hours AND     [2] bladder feels very full (e.g., palpable bladder or strong urge to urinate)    Negative: [1] Pain in the scrotum or testicle AND [2] present > 1 hour    Negative: Patient sounds very sick or weak to the triager    Negative: [1] MILD-MODERATE pain AND [2] constant AND [3] present > 2 hours    Negative: [1] Vomiting AND [2] abdomen looks much more swollen than usual    Negative: [1] Vomiting AND [2] contains bile (green color)    Negative: White of the eyes have " turned yellow (i.e., jaundice)    Negative: Fever > 103 F (39.4 C)    Negative: [1] Fever > 101 F (38.3 C) AND [2] age > 60    Negative: [1] Fever > 101 F (38.3 C) AND [2] bedridden (e.g., nursing home patient, CVA, chronic illness, recovering from surgery)    Negative: [1] Fever > 100.5 F (38.1 C) AND [2] diabetes mellitus or weak immune system (e.g., HIV positive, cancer chemo, splenectomy, organ transplant, chronic steroids)    Negative: [1] SEVERE abdominal pain AND [2] present < 1 hour    Protocols used: ABDOMINAL PAIN - MALE-ADULT-AH

## 2019-01-02 ENCOUNTER — OFFICE VISIT (OUTPATIENT)
Dept: FAMILY MEDICINE | Facility: CLINIC | Age: 39
End: 2019-01-02
Payer: COMMERCIAL

## 2019-01-02 VITALS
HEIGHT: 74 IN | OXYGEN SATURATION: 96 % | HEART RATE: 110 BPM | RESPIRATION RATE: 18 BRPM | SYSTOLIC BLOOD PRESSURE: 138 MMHG | DIASTOLIC BLOOD PRESSURE: 88 MMHG | BODY MASS INDEX: 40.43 KG/M2 | WEIGHT: 315 LBS | TEMPERATURE: 97.4 F

## 2019-01-02 DIAGNOSIS — R10.84 ABDOMINAL PAIN, GENERALIZED: Primary | ICD-10-CM

## 2019-01-02 PROCEDURE — 99213 OFFICE O/P EST LOW 20 MIN: CPT | Performed by: FAMILY MEDICINE

## 2019-01-02 ASSESSMENT — PAIN SCALES - GENERAL: PAINLEVEL: MILD PAIN (2)

## 2019-01-02 ASSESSMENT — MIFFLIN-ST. JEOR: SCORE: 2592.31

## 2019-01-02 NOTE — PROGRESS NOTES
"  SUBJECTIVE:                                                      Chief Complaint   Patient presents with     Abdominal Pain        ABDOMINAL   PAIN     Onset: 4 days    Description:   Character: Sharp, Dull ache and Cramping  Location: right upper quadrant left upper quadrant right lower quadrant  Radiation: None    Intensity: moderate    Progression of Symptoms:  same    Accompanying Signs & Symptoms:  Fever/Chills?: no   Gas/Bloating: YES  Nausea: no   Vomitting: no   Diarrhea?: YES  Constipation:YES  Dysuria or Hematuria: no    History:   Trauma: no   Previous similar pain: no    Previous tests done: none    Precipitating factors:   Does the pain change with:     Food: YES     BM: YES    Urination: no     Alleviating factors:  None     Therapies Tried and outcome: none     LMP:  not applicable       Geoffrey is a 38 year old comes in with abdominal pain over the last 4 days.  It is dull and crampy at times.  Stools have been hard and small size.  Some loose stool this morning.  Discomfort is fairly constant.  No history of similar.    ROS:  Constitutional, HEENT, cardiovascular, pulmonary, gi and gu systems are negative, except as otherwise noted.    OBJECTIVE:                                                    /88   Pulse 110   Temp 97.4  F (36.3  C) (Temporal)   Resp 18   Ht 1.875 m (6' 1.8\")   Wt (!) 160.6 kg (354 lb)   SpO2 96%   BMI 45.70 kg/m    Body mass index is 45.7 kg/m .    GENERAL: healthy, alert and no distress  NECK: no adenopathy, no asymmetry, masses, or scars and thyroid normal to palpation  RESP: lungs clear to auscultation - no rales, rhonchi or wheezes  CV: regular rate and rhythm, normal S1 S2, no S3 or S4, no murmur, click or rub, no peripheral edema and peripheral pulses strong  ABDOMEN: soft, nontender, no hepatosplenomegaly, no masses and bowel sounds normal  MS: no gross musculoskeletal defects noted, no edema    Diagnostic Test Results:  none      ASSESSMENT/PLAN:            "                                             ICD-10-CM    1. Abdominal pain, generalized R10.84        Most likely constipation.  Discussed MiraLAX and senna.  See how he does with that and call if not successful.        Trav Jacob MD  [facility]

## 2019-01-09 DIAGNOSIS — K21.9 GASTROESOPHAGEAL REFLUX DISEASE WITHOUT ESOPHAGITIS: ICD-10-CM

## 2019-01-09 NOTE — TELEPHONE ENCOUNTER
Prescription approved per [facility] Refill Protocol.  Modesta De Paz, RN, BSN       no loss of consciousness/no disorientation/no dizziness/no neck tenderness

## 2019-01-09 NOTE — TELEPHONE ENCOUNTER
"Requested Prescriptions   Pending Prescriptions Disp Refills     ranitidine (ZANTAC) 300 MG tablet [Pharmacy Med Name: RANITIDINE 300MG TABLETS] 30 tablet 0    Last Written Prescription Date:  12-14-18  Last Fill Quantity: 30,  # refills: 0   Last office visit: 1/2/2019 with prescribing provider:  1-2-19   Future Office Visit:   Sig: TAKE 1 TABLET(300 MG) BY MOUTH AT BEDTIME    H2 Blockers Protocol Passed - 1/9/2019 12:13 PM       Passed - Patient is age 12 or older       Passed - Recent (12 mo) or future (30 days) visit within the authorizing provider's specialty    Patient had office visit in the last 12 months or has a visit in the next 30 days with authorizing provider or within the authorizing provider's specialty.  See \"Patient Info\" tab in inbasket, or \"Choose Columns\" in Meds & Orders section of the refill encounter.             Passed - Medication is active on med list        "

## 2019-01-31 ENCOUNTER — TRANSFERRED RECORDS (OUTPATIENT)
Dept: HEALTH INFORMATION MANAGEMENT | Facility: CLINIC | Age: 39
End: 2019-01-31

## 2019-02-04 ENCOUNTER — OFFICE VISIT (OUTPATIENT)
Dept: FAMILY MEDICINE | Facility: CLINIC | Age: 39
End: 2019-02-04
Payer: COMMERCIAL

## 2019-02-04 VITALS
BODY MASS INDEX: 42.66 KG/M2 | RESPIRATION RATE: 20 BRPM | HEART RATE: 80 BPM | SYSTOLIC BLOOD PRESSURE: 130 MMHG | TEMPERATURE: 97.7 F | HEIGHT: 72 IN | OXYGEN SATURATION: 99 % | DIASTOLIC BLOOD PRESSURE: 88 MMHG | WEIGHT: 315 LBS

## 2019-02-04 DIAGNOSIS — K21.9 GASTROESOPHAGEAL REFLUX DISEASE WITHOUT ESOPHAGITIS: ICD-10-CM

## 2019-02-04 DIAGNOSIS — K59.00 CONSTIPATION, UNSPECIFIED CONSTIPATION TYPE: Primary | ICD-10-CM

## 2019-02-04 LAB — TSH SERPL DL<=0.005 MIU/L-ACNC: 2.38 MU/L (ref 0.4–4)

## 2019-02-04 PROCEDURE — 36415 COLL VENOUS BLD VENIPUNCTURE: CPT | Performed by: PHYSICIAN ASSISTANT

## 2019-02-04 PROCEDURE — 84443 ASSAY THYROID STIM HORMONE: CPT | Performed by: PHYSICIAN ASSISTANT

## 2019-02-04 PROCEDURE — 99214 OFFICE O/P EST MOD 30 MIN: CPT | Performed by: PHYSICIAN ASSISTANT

## 2019-02-04 ASSESSMENT — MIFFLIN-ST. JEOR: SCORE: 2578.26

## 2019-02-04 NOTE — PROGRESS NOTES
SUBJECTIVE:   Geoffrey Wilson is a 38 year old male who presents to clinic today for the following health issues:      Medication Followup of omeprazole    Taking Medication as prescribed: yes    Side Effects:  Constipation     Medication Helping Symptoms:  yes       Some water brash.   Modest substernal chest pain. Always at rest. Sometimes worse at noc.   Recent visit with the ED and a gi cocktail resolved his symptoms.     Problem list and histories reviewed & adjusted, as indicated.  Additional history: as documented    Patient Active Problem List   Diagnosis     Cellulitis-scrotum     Morbid obesity due to excess calories (H)     SIRS (systemic inflammatory response syndrome) (H)     Scrotal infection     Elevated blood pressure reading without diagnosis of hypertension     Hyperlipidemia LDL goal <100     Type 2 diabetes mellitus without complication, without long-term current use of insulin (H)     Type 2 diabetes mellitus with microalbuminuria, without long-term current use of insulin (H)     Past Surgical History:   Procedure Laterality Date     HC REPAIR ING HERNIA,5+Y/O,REDUCIBL  Age 6       Social History     Tobacco Use     Smoking status: Never Smoker     Smokeless tobacco: Never Used   Substance Use Topics     Alcohol use: Yes     Alcohol/week: 0.0 oz     Comment: 2 times/ month     Family History   Problem Relation Age of Onset     Diabetes Father      Lipids Father      Hypertension Mother      Heart Disease Brother          Current Outpatient Medications   Medication Sig Dispense Refill     atorvastatin (LIPITOR) 20 MG tablet TAKE 1 TABLET(20 MG) BY MOUTH DAILY 90 tablet 1     blood glucose (NO BRAND SPECIFIED) lancets standard Use to test blood sugar 1 times daily or as needed if symptomatic. 2 Box 0     blood glucose monitoring (NO BRAND SPECIFIED) test strip Use to test blood sugars 4 times daily and as needed if symptomatic 50 each 1     glipiZIDE (GLUCOTROL XL) 10 MG 24 hr tablet TAKE 1  TABLET(10 MG) BY MOUTH DAILY 90 tablet 1     losartan (COZAAR) 50 MG tablet Take 1 tablet (50 mg) by mouth daily 90 tablet 1     metFORMIN (GLUCOPHAGE-XR) 500 MG 24 hr tablet TAKE 4 TABLETS BY MOUTH DAILY WITH DINNER 120 tablet 0     omeprazole (PRILOSEC) 40 MG capsule Take 1 capsule (40 mg) by mouth daily 90 capsule 3     order for DME Equipment being ordered: glucometer to check blood sugars 4 times daily and as needed 1 Device 0     ranitidine (ZANTAC) 300 MG tablet TAKE 1 TABLET(300 MG) BY MOUTH AT BEDTIME 30 tablet 5     terbinafine (LAMISIL AT) 1 % cream Apply topically 2 times daily For fungal infection not resolved with other antifungals (e.g. Clotrimazole) 30 g 1     Allergies   Allergen Reactions     Penicillins Rash     Recent Labs   Lab Test 11/20/18  1523 09/18/18 08/20/18  1659 05/11/18  1621 10/19/16  1134   A1C 6.2*  --  8.8* 9.4* 6.0   LDL 78  --   --   --  134*   HDL 39*  --   --   --  40   TRIG 113  --   --   --  245*   CR  --  0.95  --  0.98 0.98   GFRESTIMATED  --  >60  --  85 86   GFRESTBLACK  --  >60  --  >90 >90  African American GFR Calc     POTASSIUM  --  4.0  --  4.2 4.0   TSH  --   --   --  1.97  --       BP Readings from Last 3 Encounters:   02/04/19 130/88   01/02/19 138/88   11/20/18 136/84    Wt Readings from Last 3 Encounters:   02/04/19 (!) 161.5 kg (356 lb)   01/02/19 (!) 160.6 kg (354 lb)   11/20/18 (!) 163.3 kg (360 lb)                  Labs reviewed in EPIC    Reviewed and updated as needed this visit by clinical staff  Tobacco  Allergies  Meds  Med Hx  Surg Hx  Fam Hx  Soc Hx      Reviewed and updated as needed this visit by Provider         All other systems negative except as outline above  OBJECTIVE:  Eye exam - right eye normal lid, conjunctiva, cornea, pupil and fundus, left eye normal lid, conjunctiva, cornea, pupil and fundus.  Thyroid not palpable, not enlarged, no nodules detected.  CHEST:chest clear to IPPA, no tachypnea, retractions or cyanosis and S1, S2  normal, no murmur, no gallop, rate regular.  The abdomen is soft without tenderness, guarding, mass, rebound or organomegaly. Bowel sounds are normal. No CVA tenderness or inguinal adenopathy noted.    Geoffrey was seen today for gastrointestinal problem.    Diagnoses and all orders for this visit:    Constipation, unspecified constipation type  -     TSH with free T4 reflex  -     GASTROENTEROLOGY ADULT REF CONSULT ONLY    Gastroesophageal reflux disease without esophagitis  -     GASTROENTEROLOGY ADULT REF CONSULT ONLY      Stop ranitidine and increase omeprazole to one tab bid with meals.   work on lifestyle modification

## 2019-02-04 NOTE — LETTER
February 11, 2019      Geoffrey Wilson  [address] [facility] [address]        Dear ,    We are writing to inform you of your test results.    Your thyroid function came back nice and normal.    Resulted Orders   TSH with free T4 reflex   Result Value Ref Range    TSH 2.38 0.40 - 4.00 mU/L       If you have any questions or concerns, please call the clinic at the number listed above.       Sincerely,        David Deutsch PA-C/marcelina

## 2019-03-08 DIAGNOSIS — E11.29 TYPE 2 DIABETES MELLITUS WITH MICROALBUMINURIA, WITHOUT LONG-TERM CURRENT USE OF INSULIN (H): ICD-10-CM

## 2019-03-08 DIAGNOSIS — R80.9 TYPE 2 DIABETES MELLITUS WITH MICROALBUMINURIA, WITHOUT LONG-TERM CURRENT USE OF INSULIN (H): ICD-10-CM

## 2019-03-08 RX ORDER — METFORMIN HCL 500 MG
TABLET, EXTENDED RELEASE 24 HR ORAL
Qty: 120 TABLET | Refills: 1 | Status: SHIPPED | OUTPATIENT
Start: 2019-03-08 | End: 2019-05-08

## 2019-03-08 NOTE — TELEPHONE ENCOUNTER
"Requested Prescriptions   Pending Prescriptions Disp Refills     metFORMIN (GLUCOPHAGE-XR) 500 MG 24 hr tablet [Pharmacy Med Name: METFORMIN ER 500MG 24HR TABS] 120 tablet 0    Last Written Prescription Date:  2/4/19  Last Fill Quantity: 120,  # refills: 0   Last office visit: 2/4/2019 with prescribing provider:  MACARENA Marquez   Future Office Visit:   Sig: TAKE 4 TABLETS BY MOUTH DAILY WITH DINNER    Biguanide Agents Passed - 3/8/2019  9:06 AM       Passed - Blood pressure less than 140/90 in past 6 months    BP Readings from Last 3 Encounters:   02/04/19 130/88   01/02/19 138/88   11/20/18 136/84                Passed - Patient has documented LDL within the past 12 mos.    Recent Labs   Lab Test 11/20/18  1523   LDL 78            Passed - Patient has had a Microalbumin in the past 15 mos.    Recent Labs   Lab Test 05/11/18  1618   MICROL 296   UMALCR 198.66*            Passed - Patient is age 10 or older       Passed - Patient has documented A1c within the specified period of time.    If HgbA1C is 8 or greater, it needs to be on file within the past 3 months.  If less than 8, must be on file within the past 6 months.     Recent Labs   Lab Test 11/20/18  1523   A1C 6.2*            Passed - Patient's CR is NOT>1.4 OR Patient's EGFR is NOT<45 within past 12 mos.    Recent Labs   Lab Test 09/18/18   GFRESTIMATED >60   GFRESTBLACK >60       Recent Labs   Lab Test 09/18/18   CR 0.95            Passed - Patient does NOT have a diagnosis of CHF.       Passed - Medication is active on med list       Passed - Recent (6 mo) or future (30 days) visit within the authorizing provider's specialty    Patient had office visit in the last 6 months or has a visit in the next 30 days with authorizing provider or within the authorizing provider's specialty.  See \"Patient Info\" tab in inbasket, or \"Choose Columns\" in Meds & Orders section of the refill encounter.            "

## 2019-03-08 NOTE — TELEPHONE ENCOUNTER
Prescription approved per [facility] Refill Protocol.  Modesta De Paz, RN, BSN

## 2019-05-08 DIAGNOSIS — R80.9 TYPE 2 DIABETES MELLITUS WITH MICROALBUMINURIA, WITHOUT LONG-TERM CURRENT USE OF INSULIN (H): ICD-10-CM

## 2019-05-08 DIAGNOSIS — E11.29 TYPE 2 DIABETES MELLITUS WITH MICROALBUMINURIA, WITHOUT LONG-TERM CURRENT USE OF INSULIN (H): ICD-10-CM

## 2019-05-08 NOTE — TELEPHONE ENCOUNTER
"Requested Prescriptions   Pending Prescriptions Disp Refills     metFORMIN (GLUCOPHAGE-XR) 500 MG 24 hr tablet [Pharmacy Med Name: METFORMIN ER 500MG 24HR TABS] 120 tablet 0     Sig: TAKE 4 TABLETS BY MOUTH DAILY WITH DINNER. FOLLOW UP. MAY 2019   Last Written Prescription Date:  4-9-19  Last Fill Quantity: 120,  # refills: 0   Last office visit: 2/4/2019 with prescribing provider:  2-4-19   Future Office Visit:      Biguanide Agents Passed - 5/8/2019  1:10 PM        Passed - Blood pressure less than 140/90 in past 6 months     BP Readings from Last 3 Encounters:   02/04/19 130/88   01/02/19 138/88   11/20/18 136/84                 Passed - Patient has documented LDL within the past 12 mos.     Recent Labs   Lab Test 11/20/18  1523   LDL 78             Passed - Patient has had a Microalbumin in the past 15 mos.     Recent Labs   Lab Test 05/11/18  1618   MICROL 296   UMALCR 198.66*             Passed - Patient is age 10 or older        Passed - Patient has documented A1c within the specified period of time.     If HgbA1C is 8 or greater, it needs to be on file within the past 3 months.  If less than 8, must be on file within the past 6 months.     Recent Labs   Lab Test 11/20/18  1523   A1C 6.2*             Passed - Patient's CR is NOT>1.4 OR Patient's EGFR is NOT<45 within past 12 mos.     Recent Labs   Lab Test 09/18/18   GFRESTIMATED >60   GFRESTBLACK >60       Recent Labs   Lab Test 09/18/18   CR 0.95             Passed - Patient does NOT have a diagnosis of CHF.        Passed - Medication is active on med list        Passed - Recent (6 mo) or future (30 days) visit within the authorizing provider's specialty     Patient had office visit in the last 6 months or has a visit in the next 30 days with authorizing provider or within the authorizing provider's specialty.  See \"Patient Info\" tab in inbasket, or \"Choose Columns\" in Meds & Orders section of the refill encounter.            "

## 2019-05-10 NOTE — TELEPHONE ENCOUNTER
Routing refill request to provider for review/approval because:  Isabel given x1 and patient did not follow up, please advise    Sybil Reyes RN, BSN, PHN

## 2019-05-11 RX ORDER — METFORMIN HCL 500 MG
TABLET, EXTENDED RELEASE 24 HR ORAL
Qty: 120 TABLET | Refills: 0 | Status: SHIPPED | OUTPATIENT
Start: 2019-05-11 | End: 2019-06-12

## 2019-05-14 ENCOUNTER — OFFICE VISIT (OUTPATIENT)
Dept: FAMILY MEDICINE | Facility: OTHER | Age: 39
End: 2019-05-14
Payer: COMMERCIAL

## 2019-05-14 ENCOUNTER — NURSE TRIAGE (OUTPATIENT)
Dept: NURSING | Facility: CLINIC | Age: 39
End: 2019-05-14

## 2019-05-14 VITALS
TEMPERATURE: 98.6 F | RESPIRATION RATE: 16 BRPM | BODY MASS INDEX: 46.75 KG/M2 | SYSTOLIC BLOOD PRESSURE: 114 MMHG | WEIGHT: 315 LBS | DIASTOLIC BLOOD PRESSURE: 80 MMHG | OXYGEN SATURATION: 95 % | HEART RATE: 102 BPM

## 2019-05-14 DIAGNOSIS — A08.4 VIRAL GASTROENTERITIS: Primary | ICD-10-CM

## 2019-05-14 PROCEDURE — 99213 OFFICE O/P EST LOW 20 MIN: CPT | Performed by: PHYSICIAN ASSISTANT

## 2019-05-14 ASSESSMENT — PAIN SCALES - GENERAL: PAINLEVEL: NO PAIN (0)

## 2019-05-14 NOTE — PROGRESS NOTES
SUBJECTIVE:   Geoffrey Wilson is a 38 year old male who presents to clinic today for the following health issues:      HPI  Diarrhea  Onset: Saturday     Description:   Consistency of stool: watery  Blood in stool: no   Number of loose stools in past 24 hours: 10    Progression of Symptoms:  worsening    Accompanying Signs & Symptoms:  Fever: no   Nausea or vomiting; YES- on Saturday   Abdominal pain: no   Episodes of constipation: no   Weight loss: no   Dysuria/hematuria: no    History:   Ill contacts: no   Recent use of antibiotics: no    Recent travels: no          Recent medication-new or changes(Rx or OTC): no   Abdominal Surgeries: inguinal hernia repair as child.   Diet changes: no    Precipitating factors:   Saw a fully formed pill, concerned hes not digesting anything    Alleviating factors:   none    Therapies Tried and outcome:  Imodium AD; Outcome: seemed to work Saturday but now nothing    - Saturday went a lot often, was vomiting as well.  Had stomachache. Felt feverish.   - Felt better Sunday and Monday doing well.   - Mainly drank fluids and ate broths on Sunday, Monday resumed normal diet.   - Last night symptoms started 9:30 PM.  Just diarrhea no vomiting. No stomachache.  Has had 8-9 stools since last night.   - Stools are just watery, moderate amount, no blood.    - Normally has 1 BM per day.  Sunday had one every 3-4 hours that were loose and Monday had 2 prior to recurrence of diarrhea and the stools were more formed at that time.   - heart burn improved since starting PPI  - Did imodium on Saturday and that really helped, took a dose last night and didn't seem to help much.     Additional history: as documented    Reviewed and updated as needed this visit by clinical staff  Tobacco  Allergies  Meds  Med Hx  Surg Hx  Fam Hx  Soc Hx        Reviewed and updated as needed this visit by Provider         Current Outpatient Medications   Medication Sig Dispense Refill     glipiZIDE (GLUCOTROL  XL) 10 MG 24 hr tablet TAKE 1 TABLET(10 MG) BY MOUTH DAILY 90 tablet 1     losartan (COZAAR) 50 MG tablet Take 1 tablet (50 mg) by mouth daily 90 tablet 1     metFORMIN (GLUCOPHAGE-XR) 500 MG 24 hr tablet TAKE 4 TABLETS BY MOUTH DAILY WITH DINNER. FOLLOW UP. MAY 2019 120 tablet 0     omeprazole (PRILOSEC) 40 MG capsule Take 1 capsule (40 mg) by mouth daily 90 capsule 3     atorvastatin (LIPITOR) 20 MG tablet TAKE 1 TABLET(20 MG) BY MOUTH DAILY (Patient not taking: Reported on 5/14/2019) 90 tablet 1     blood glucose (NO BRAND SPECIFIED) lancets standard Use to test blood sugar 1 times daily or as needed if symptomatic. (Patient not taking: Reported on 5/14/2019) 2 Box 0     blood glucose monitoring (NO BRAND SPECIFIED) test strip Use to test blood sugars 4 times daily and as needed if symptomatic (Patient not taking: Reported on 5/14/2019) 50 each 1     order for DME Equipment being ordered: glucometer to check blood sugars 4 times daily and as needed (Patient not taking: Reported on 5/14/2019) 1 Device 0     ranitidine (ZANTAC) 300 MG tablet TAKE 1 TABLET(300 MG) BY MOUTH AT BEDTIME (Patient not taking: Reported on 5/14/2019) 30 tablet 5     terbinafine (LAMISIL AT) 1 % cream Apply topically 2 times daily For fungal infection not resolved with other antifungals (e.g. Clotrimazole) (Patient not taking: Reported on 5/14/2019) 30 g 1       ROS:  Constitutional, HEENT, cardiovascular, pulmonary, gi and gu systems are negative, except as otherwise noted.    OBJECTIVE:     /80   Pulse 102   Temp 98.6  F (37  C) (Temporal)   Resp 16   Wt (!) 157.9 kg (348 lb)   SpO2 95%   BMI 46.75 kg/m    Body mass index is 46.75 kg/m .  GENERAL: healthy, alert and no distress  RESP: lungs clear to auscultation - no rales, rhonchi or wheezes  CV: regular rate and rhythm, normal S1 S2, no S3 or S4, no murmur, click or rub, no peripheral edema and peripheral pulses strong  ABDOMEN: soft, nontender, hyperactive bowel sounds, no  hepatosplenomegaly, no masses   MS: no gross musculoskeletal defects noted, no edema    Diagnostic Test Results:  none     ASSESSMENT/PLAN:       ICD-10-CM    1. Viral gastroenteritis A08.4        Likely viral cause for acute diarrhea initially, however, recurrence of diarrhea more likely rebound after having resumed normal diet after feeling better.  He admits that today he hasn't had any of the stomachaches and fever/chills sensation he had on Sunday when his symptoms started. Recommended going back to liquid/soft diet for the next 24 hours and start Imodium 3 times per day and then tomorrow can start incorporating easy to digest solids and as long as he is tolerating he can advance diet daily.  If he has symptoms recur at a certain point he should remain on that previous day diet for a while longer and then advance.  Recommended probiotics as well.   If his symptoms are not continuing to improve with this or at any point he is worse or new symptoms develop recommend follow-up in clinic.      Options for treatment and follow-up care were reviewed with the patient and/or guardian. Patient and/or guardian engaged in the decision making process and verbalized understanding of the options discussed and agreed with the final plan.     Lauren Gann PA-C  [facility]

## 2019-05-14 NOTE — TELEPHONE ENCOUNTER
Geoffrey has diarrhea since Saturday and vomited x1.  Denies fever.  Diarrhea eased up on Sunday and then back to having diarrhea every two hours.  Denies abdominal pain severe.      Additional Information    Negative: Shock suspected (e.g., cold/pale/clammy skin, too weak to stand, low BP, rapid pulse)    Negative: Difficult to awaken or acting confused (e.g., disoriented, slurred speech)    Negative: Sounds like a life-threatening emergency to the triager    Negative: SEVERE abdominal pain (e.g., excruciating) and present > 1 hour    Negative: SEVERE abdominal pain and age > 60    Negative: Bloody, black, or tarry bowel movements    Negative: SEVERE diarrhea (e.g., 7 or more times / day more than normal) and age > 60 years    Negative: Constant abdominal pain lasting > 2 hours    Negative: Drinking very little and has signs of dehydration (e.g., no urine > 12 hours, very dry mouth, very lightheaded)    Negative: Patient sounds very sick or weak to the triager    SEVERE diarrhea (e.g., 7 or more times / day more than normal) and present > 24 hours (1 day)    Protocols used: DIARRHEA-A-OH

## 2019-06-11 DIAGNOSIS — E11.29 TYPE 2 DIABETES MELLITUS WITH MICROALBUMINURIA, WITHOUT LONG-TERM CURRENT USE OF INSULIN (H): ICD-10-CM

## 2019-06-11 DIAGNOSIS — R80.9 TYPE 2 DIABETES MELLITUS WITH MICROALBUMINURIA, WITHOUT LONG-TERM CURRENT USE OF INSULIN (H): ICD-10-CM

## 2019-06-11 NOTE — TELEPHONE ENCOUNTER
Patient due for diabetes follow up.  30 day cuco period fill pended for provider approval with reminder to patient to schedule appointment for further refills.   Please advise on cuco refill.     Sybil Reyes RN, BSN, PHN

## 2019-06-11 NOTE — TELEPHONE ENCOUNTER
"Requested Prescriptions   Pending Prescriptions Disp Refills     glipiZIDE (GLUCOTROL XL) 10 MG 24 hr tablet [Pharmacy Med Name: GLIPIZIDE ER 10MG TABLETS] 90 tablet 0     Sig: TAKE 1 TABLET(10 MG) BY MOUTH DAILY       Sulfonylurea Agents Failed - 6/11/2019  9:30 AM        Failed - Patient has documented A1c within the specified period of time.     If HgbA1C is 8 or greater, it needs to be on file within the past 3 months.  If less than 8, must be on file within the past 6 months.     Recent Labs   Lab Test 11/20/18  1523   A1C 6.2*             Passed - Blood pressure less than 140/90 in past 6 months     BP Readings from Last 3 Encounters:   05/14/19 114/80   02/04/19 130/88   01/02/19 138/88                 Passed - Patient has documented LDL within the past 12 mos.     Recent Labs   Lab Test 11/20/18  1523   LDL 78             Passed - Patient has had a Microalbumin in the past 15 mos.     Recent Labs   Lab Test 05/11/18  1618   MICROL 296   UMALCR 198.66*             Passed - Medication is active on med list        Passed - Patient is age 18 or older        Passed - Patient has a recent creatinine (normal) within the past 12 mos.     Recent Labs   Lab Test 09/18/18   CR 0.95             Passed - Recent (6 mo) or future (30 days) visit within the authorizing provider's specialty     Patient had office visit in the last 6 months or has a visit in the next 30 days with authorizing provider or within the authorizing provider's specialty.  See \"Patient Info\" tab in inbasket, or \"Choose Columns\" in Meds & Orders section of the refill encounter.            "

## 2019-06-12 DIAGNOSIS — E11.29 TYPE 2 DIABETES MELLITUS WITH MICROALBUMINURIA, WITHOUT LONG-TERM CURRENT USE OF INSULIN (H): ICD-10-CM

## 2019-06-12 DIAGNOSIS — R80.9 TYPE 2 DIABETES MELLITUS WITH MICROALBUMINURIA, WITHOUT LONG-TERM CURRENT USE OF INSULIN (H): ICD-10-CM

## 2019-06-12 RX ORDER — GLIPIZIDE 10 MG/1
TABLET, FILM COATED, EXTENDED RELEASE ORAL
Qty: 30 TABLET | Refills: 0 | Status: SHIPPED | OUTPATIENT
Start: 2019-06-12 | End: 2019-07-13

## 2019-06-12 NOTE — TELEPHONE ENCOUNTER
"Requested Prescriptions   Pending Prescriptions Disp Refills     metFORMIN (GLUCOPHAGE-XR) 500 MG 24 hr tablet [Pharmacy Med Name: METFORMIN ER 500MG 24HR TABS] 120 tablet 0     Sig: TAKE 4 TABLETS BY MOUTH DAILY WITH DINNER. FOLLOW UP. MAY 2019   Last Written Prescription Date:  5-11-19  Last Fill Quantity: 120,  # refills: 0   Last office visit: 5/14/2019 with prescribing provider:  5-14-19   Future Office Visit:      Biguanide Agents Failed - 6/12/2019  8:50 AM        Failed - Patient has documented A1c within the specified period of time.     If HgbA1C is 8 or greater, it needs to be on file within the past 3 months.  If less than 8, must be on file within the past 6 months.     Recent Labs   Lab Test 11/20/18  1523   A1C 6.2*             Passed - Blood pressure less than 140/90 in past 6 months     BP Readings from Last 3 Encounters:   05/14/19 114/80   02/04/19 130/88   01/02/19 138/88                 Passed - Patient has documented LDL within the past 12 mos.     Recent Labs   Lab Test 11/20/18  1523   LDL 78             Passed - Patient has had a Microalbumin in the past 15 mos.     Recent Labs   Lab Test 05/11/18  1618   MICROL 296   UMALCR 198.66*             Passed - Patient is age 10 or older        Passed - Patient's CR is NOT>1.4 OR Patient's EGFR is NOT<45 within past 12 mos.     Recent Labs   Lab Test 09/18/18   GFRESTIMATED >60   GFRESTBLACK >60       Recent Labs   Lab Test 09/18/18   CR 0.95             Passed - Patient does NOT have a diagnosis of CHF.        Passed - Medication is active on med list        Passed - Recent (6 mo) or future (30 days) visit within the authorizing provider's specialty     Patient had office visit in the last 6 months or has a visit in the next 30 days with authorizing provider or within the authorizing provider's specialty.  See \"Patient Info\" tab in inbasket, or \"Choose Columns\" in Meds & Orders section of the refill encounter.            "

## 2019-06-13 RX ORDER — METFORMIN HCL 500 MG
TABLET, EXTENDED RELEASE 24 HR ORAL
Qty: 120 TABLET | Refills: 0 | Status: SHIPPED | OUTPATIENT
Start: 2019-06-13 | End: 2019-07-13

## 2019-07-13 DIAGNOSIS — E11.29 TYPE 2 DIABETES MELLITUS WITH MICROALBUMINURIA, WITHOUT LONG-TERM CURRENT USE OF INSULIN (H): ICD-10-CM

## 2019-07-13 DIAGNOSIS — R80.9 TYPE 2 DIABETES MELLITUS WITH MICROALBUMINURIA, WITHOUT LONG-TERM CURRENT USE OF INSULIN (H): ICD-10-CM

## 2019-07-15 NOTE — TELEPHONE ENCOUNTER
"Requested Prescriptions   Pending Prescriptions Disp Refills     glipiZIDE (GLUCOTROL XL) 10 MG 24 hr tablet [Pharmacy Med Name: GLIPIZIDE ER 10MG TABLETS]  Last Written Prescription Date:  06/12/19  Last Fill Quantity: 30,  # refills: 0   Last office visit:02/04/2019 with prescribing provider:  DEVON Deutsch   Future Office Visit:   Next 5 appointments (look out 90 days)    Jul 18, 2019  4:00 PM CDT  [facility]-C  [facility] [address] [facility] [address]  [phone]          30 tablet 0     Sig: TAKE 1 TABLET BY MOUTH ONCE DAILY       Sulfonylurea Agents Failed - 7/13/2019  7:24 PM        Failed - Patient has documented A1c within the specified period of time.     If HgbA1C is 8 or greater, it needs to be on file within the past 3 months.  If less than 8, must be on file within the past 6 months.     Recent Labs   Lab Test 11/20/18  1523   A1C 6.2*             Passed - Blood pressure less than 140/90 in past 6 months     BP Readings from Last 3 Encounters:   05/14/19 114/80   02/04/19 130/88   01/02/19 138/88                 Passed - Patient has documented LDL within the past 12 mos.     Recent Labs   Lab Test 11/20/18  1523   LDL 78             Passed - Patient has had a Microalbumin in the past 15 mos.     Recent Labs   Lab Test 05/11/18  1618   MICROL 296   UMALCR 198.66*             Passed - Medication is active on med list        Passed - Patient is age 18 or older        Passed - Patient has a recent creatinine (normal) within the past 12 mos.     Recent Labs   Lab Test 09/18/18   CR 0.95             Passed - Recent (6 mo) or future (30 days) visit within the authorizing provider's specialty     Patient had office visit in the last 6 months or has a visit in the next 30 days with authorizing provider or within the authorizing provider's specialty.  See \"Patient Info\" tab in inbasket, or \"Choose Columns\" in Meds & Orders section " of the refill encounter.            metFORMIN (GLUCOPHAGE-XR) 500 MG 24 hr tablet [Pharmacy Med Name: METFORMIN ER 500MG 24HR TABS]  Last Written Prescription Date:  06/13/19  Last Fill Quantity: 120,  # refills: 0   Last office visit:02/04/2019 with prescribing provider:  DEVON Deutsch   Future Office Visit:   Next 5 appointments (look out 90 days)    Jul 18, 2019  4:00 PM CDT  Office Visit with David Deutsch PA-C  [facility] [address] [address] [address]  [phone]          120 tablet 0     Sig: TAKE 4 TABLETS BY MOUTH DAILY WITH DINNER. FOLLOW UP. MAY 2019       Biguanide Agents Failed - 7/13/2019  7:24 PM        Failed - Patient has documented A1c within the specified period of time.     If HgbA1C is 8 or greater, it needs to be on file within the past 3 months.  If less than 8, must be on file within the past 6 months.     Recent Labs   Lab Test 11/20/18  1523   A1C 6.2*             Passed - Blood pressure less than 140/90 in past 6 months     BP Readings from Last 3 Encounters:   05/14/19 114/80   02/04/19 130/88   01/02/19 138/88                 Passed - Patient has documented LDL within the past 12 mos.     Recent Labs   Lab Test 11/20/18  1523   LDL 78             Passed - Patient has had a Microalbumin in the past 15 mos.     Recent Labs   Lab Test 05/11/18  1618   MICROL 296   UMALCR 198.66*             Passed - Patient is age 10 or older        Passed - Patient's CR is NOT>1.4 OR Patient's EGFR is NOT<45 within past 12 mos.     Recent Labs   Lab Test 09/18/18   GFRESTIMATED >60   GFRESTBLACK >60       Recent Labs   Lab Test 09/18/18   CR 0.95             Passed - Patient does NOT have a diagnosis of CHF.        Passed - Medication is active on med list        Passed - Recent (6 mo) or future (30 days) visit within the authorizing provider's specialty     Patient had office visit in the last 6 months or has a visit in the next 30 days with  "authorizing provider or within the authorizing provider's specialty.  See \"Patient Info\" tab in inbasket, or \"Choose Columns\" in Meds & Orders section of the refill encounter.              "

## 2019-07-15 NOTE — TELEPHONE ENCOUNTER
Routing refill request to provider for review/approval because:  Isabel given x2 and patient did not follow up, please advise    Appointment scheduled for 7/18/19. Pended for 2 week supply.

## 2019-07-16 RX ORDER — GLIPIZIDE 10 MG/1
TABLET, FILM COATED, EXTENDED RELEASE ORAL
Qty: 15 TABLET | Refills: 0 | Status: SHIPPED | OUTPATIENT
Start: 2019-07-16 | End: 2019-07-18

## 2019-07-16 RX ORDER — METFORMIN HCL 500 MG
TABLET, EXTENDED RELEASE 24 HR ORAL
Qty: 60 TABLET | Refills: 0 | Status: SHIPPED | OUTPATIENT
Start: 2019-07-16 | End: 2019-07-18

## 2019-07-18 ENCOUNTER — OFFICE VISIT (OUTPATIENT)
Dept: FAMILY MEDICINE | Facility: CLINIC | Age: 39
End: 2019-07-18
Payer: COMMERCIAL

## 2019-07-18 VITALS
DIASTOLIC BLOOD PRESSURE: 85 MMHG | RESPIRATION RATE: 18 BRPM | SYSTOLIC BLOOD PRESSURE: 138 MMHG | WEIGHT: 315 LBS | OXYGEN SATURATION: 97 % | HEIGHT: 75 IN | BODY MASS INDEX: 39.17 KG/M2 | HEART RATE: 84 BPM

## 2019-07-18 DIAGNOSIS — K21.9 GASTROESOPHAGEAL REFLUX DISEASE WITHOUT ESOPHAGITIS: ICD-10-CM

## 2019-07-18 DIAGNOSIS — E78.5 HYPERLIPIDEMIA LDL GOAL <100: ICD-10-CM

## 2019-07-18 DIAGNOSIS — R03.0 ELEVATED BLOOD PRESSURE READING WITHOUT DIAGNOSIS OF HYPERTENSION: ICD-10-CM

## 2019-07-18 DIAGNOSIS — E11.29 TYPE 2 DIABETES MELLITUS WITH MICROALBUMINURIA, WITHOUT LONG-TERM CURRENT USE OF INSULIN (H): ICD-10-CM

## 2019-07-18 DIAGNOSIS — E11.9 TYPE 2 DIABETES MELLITUS WITHOUT COMPLICATION, WITHOUT LONG-TERM CURRENT USE OF INSULIN (H): Primary | ICD-10-CM

## 2019-07-18 DIAGNOSIS — N52.9 ERECTILE DYSFUNCTION, UNSPECIFIED ERECTILE DYSFUNCTION TYPE: ICD-10-CM

## 2019-07-18 DIAGNOSIS — R80.9 TYPE 2 DIABETES MELLITUS WITH MICROALBUMINURIA, WITHOUT LONG-TERM CURRENT USE OF INSULIN (H): ICD-10-CM

## 2019-07-18 LAB
ANION GAP SERPL CALCULATED.3IONS-SCNC: 7 MMOL/L (ref 3–14)
BUN SERPL-MCNC: 18 MG/DL (ref 7–30)
CALCIUM SERPL-MCNC: 9.2 MG/DL (ref 8.5–10.1)
CHLORIDE SERPL-SCNC: 106 MMOL/L (ref 94–109)
CO2 SERPL-SCNC: 26 MMOL/L (ref 20–32)
CREAT SERPL-MCNC: 1.14 MG/DL (ref 0.66–1.25)
CREAT UR-MCNC: 229 MG/DL
GFR SERPL CREATININE-BSD FRML MDRD: 81 ML/MIN/{1.73_M2}
GLUCOSE SERPL-MCNC: 105 MG/DL (ref 70–99)
HBA1C MFR BLD: 5.8 % (ref 0–5.6)
MICROALBUMIN UR-MCNC: 78 MG/L
MICROALBUMIN/CREAT UR: 34.28 MG/G CR (ref 0–17)
POTASSIUM SERPL-SCNC: 3.9 MMOL/L (ref 3.4–5.3)
SODIUM SERPL-SCNC: 139 MMOL/L (ref 133–144)

## 2019-07-18 PROCEDURE — 80048 BASIC METABOLIC PNL TOTAL CA: CPT | Performed by: PHYSICIAN ASSISTANT

## 2019-07-18 PROCEDURE — 82043 UR ALBUMIN QUANTITATIVE: CPT | Performed by: PHYSICIAN ASSISTANT

## 2019-07-18 PROCEDURE — 36415 COLL VENOUS BLD VENIPUNCTURE: CPT | Performed by: PHYSICIAN ASSISTANT

## 2019-07-18 PROCEDURE — 99207 C FOOT EXAM  NO CHARGE: CPT | Performed by: PHYSICIAN ASSISTANT

## 2019-07-18 PROCEDURE — 83036 HEMOGLOBIN GLYCOSYLATED A1C: CPT | Performed by: PHYSICIAN ASSISTANT

## 2019-07-18 PROCEDURE — 99214 OFFICE O/P EST MOD 30 MIN: CPT | Performed by: PHYSICIAN ASSISTANT

## 2019-07-18 RX ORDER — SILDENAFIL CITRATE 20 MG/1
20 TABLET ORAL 3 TIMES DAILY
Qty: 30 TABLET | Refills: 11 | Status: SHIPPED | OUTPATIENT
Start: 2019-07-18 | End: 2020-01-31

## 2019-07-18 RX ORDER — ATORVASTATIN CALCIUM 20 MG/1
TABLET, FILM COATED ORAL
Qty: 90 TABLET | Refills: 1 | Status: CANCELLED | OUTPATIENT
Start: 2019-07-18

## 2019-07-18 RX ORDER — LOSARTAN POTASSIUM 50 MG/1
50 TABLET ORAL DAILY
Qty: 90 TABLET | Refills: 1 | Status: SHIPPED | OUTPATIENT
Start: 2019-07-18 | End: 2020-01-14

## 2019-07-18 RX ORDER — METFORMIN HCL 500 MG
TABLET, EXTENDED RELEASE 24 HR ORAL
Qty: 180 TABLET | Refills: 1 | Status: SHIPPED | OUTPATIENT
Start: 2019-07-18 | End: 2019-10-30

## 2019-07-18 RX ORDER — GLIPIZIDE 10 MG/1
TABLET, FILM COATED, EXTENDED RELEASE ORAL
Qty: 90 TABLET | Refills: 1 | Status: SHIPPED | OUTPATIENT
Start: 2019-07-18 | End: 2020-01-22

## 2019-07-18 RX ORDER — OMEPRAZOLE 40 MG/1
40 CAPSULE, DELAYED RELEASE ORAL DAILY
Qty: 90 CAPSULE | Refills: 3 | Status: SHIPPED | OUTPATIENT
Start: 2019-07-18 | End: 2020-10-05

## 2019-07-18 ASSESSMENT — MIFFLIN-ST. JEOR: SCORE: 2647.19

## 2019-07-18 NOTE — LETTER
August 5, 2019      Geoffrey Wilson  [address] [facility] [address]        Dear ,    We are writing to inform you of your test results.    Your recent lab work came back nice and stable.     Resulted Orders   Hemoglobin A1c   Result Value Ref Range    Hemoglobin A1C 5.8 (H) 0 - 5.6 %      Comment:      Normal <5.7% Prediabetes 5.7-6.4%  Diabetes 6.5% or higher - adopted from ADA   consensus guidelines.     Basic metabolic panel  (Ca, Cl, CO2, Creat, Gluc, K, Na, BUN)   Result Value Ref Range    Sodium 139 133 - 144 mmol/L    Potassium 3.9 3.4 - 5.3 mmol/L    Chloride 106 94 - 109 mmol/L    Carbon Dioxide 26 20 - 32 mmol/L    Anion Gap 7 3 - 14 mmol/L    Glucose 105 (H) 70 - 99 mg/dL    Urea Nitrogen 18 7 - 30 mg/dL    Creatinine 1.14 0.66 - 1.25 mg/dL    GFR Estimate 81 >60 mL/min/[1.73_m2]      Comment:      Non  GFR Calc  Starting 12/18/2018, serum creatinine based estimated GFR (eGFR) will be   calculated using the Chronic Kidney Disease Epidemiology Collaboration   (CKD-EPI) equation.      GFR Estimate If Black >90 >60 mL/min/[1.73_m2]      Comment:       GFR Calc  Starting 12/18/2018, serum creatinine based estimated GFR (eGFR) will be   calculated using the Chronic Kidney Disease Epidemiology Collaboration   (CKD-EPI) equation.      Calcium 9.2 8.5 - 10.1 mg/dL   Albumin Random Urine Quantitative with Creat Ratio   Result Value Ref Range    Creatinine Urine 229 mg/dL    Albumin Urine mg/L 78 mg/L    Albumin Urine mg/g Cr 34.28 (H) 0 - 17 mg/g Cr       If you have any questions or concerns, please call the clinic at the number listed above.       Sincerely,        David Deutsch PA-C/marcelina

## 2019-07-18 NOTE — PROGRESS NOTES
Subjective     Geoffrey Wilson is a 39 year old male who presents to clinic today for the following health issues:    HPI   Diabetes Follow-up      How often are you checking your blood sugar? Not at all        What concerns do you have today about your diabetes? None     Do you have any of these symptoms? (Select all that apply)  No numbness or tingling in feet.  No redness, sores or blisters on feet.  No complaints of excessive thirst.  No reports of blurry vision.  No significant changes to weight.     Have you had a diabetic eye exam in the last 12 months? No    Diabetes Management Resources    Hyperlipidemia Follow-Up      Are you having any of the following symptoms? (Select all that apply)  No complaints of shortness of breath, chest pain or pressure.  No increased sweating or nausea with activity.  No left-sided neck or arm pain.  No complaints of pain in calves when walking 1-2 blocks.    Are you regularly taking any medication or supplement to lower your cholesterol?   Yes- lipitor 20mg daily    Are you having muscle aches or other side effects that you think could be caused by your cholesterol lowering medication?  No    Hypertension Follow-up      Do you check your blood pressure regularly outside of the clinic? No     Are you following a low salt diet? No    Are your blood pressures ever more than 140 on the top number (systolic) OR more   than 90 on the bottom number (diastolic), for example 140/90? No    BP Readings from Last 2 Encounters:   07/18/19 138/85   05/14/19 114/80     Hemoglobin A1C (%)   Date Value   11/20/2018 6.2 (H)   08/20/2018 8.8 (H)     LDL Cholesterol Calculated (mg/dL)   Date Value   11/20/2018 78   10/19/2016 134 (H)       Amount of exercise or physical activity: None    Problems taking medications regularly: No    Medication side effects: none    Diet: regular (no restrictions)          Allergies   Allergen Reactions     Penicillins Rash     Recent Labs   Lab Test 02/04/19  0388  "11/20/18  1523 09/18/18 08/20/18  1659 05/11/18  1621 10/19/16  1134   A1C  --  6.2*  --  8.8* 9.4* 6.0   LDL  --  78  --   --   --  134*   HDL  --  39*  --   --   --  40   TRIG  --  113  --   --   --  245*   CR  --   --  0.95  --  0.98 0.98   GFRESTIMATED  --   --  >60  --  85 86   GFRESTBLACK  --   --  >60  --  >90 >90  African American GFR Calc     POTASSIUM  --   --  4.0  --  4.2 4.0   TSH 2.38  --   --   --  1.97  --       BP Readings from Last 3 Encounters:   07/18/19 138/85   05/14/19 114/80   02/04/19 130/88    Wt Readings from Last 3 Encounters:   07/18/19 (!) 164.7 kg (363 lb)   05/14/19 (!) 157.9 kg (348 lb)   02/04/19 (!) 161.5 kg (356 lb)                      Reviewed and updated as needed this visit by Provider         Review of Systems   ROS COMP: Constitutional, HEENT, cardiovascular, pulmonary, GI, , musculoskeletal, neuro, skin, endocrine and psych systems are negative, except as otherwise noted.      Objective    /85   Pulse 84   Resp 18   Ht 1.905 m (6' 3\")   Wt (!) 164.7 kg (363 lb)   SpO2 97%   BMI 45.37 kg/m    Body mass index is 45.37 kg/m .  Physical Exam   Eye exam - right eye normal lid, conjunctiva, cornea, pupil and fundus, left eye normal lid, conjunctiva, cornea, pupil and fundus.  Thyroid not palpable, not enlarged, no nodules detected.  CHEST:chest clear to IPPA, no tachypnea, retractions or cyanosis and S1, S2 normal, no murmur, no gallop, rate regular.  Foot exam - both sides normal; no swelling, tenderness or skin or vascular lesions. Color and temperature is normal. Sensation is intact. Peripheral pulses are palpable. Toenails are normal.    Geoffrey was seen today for diabetes.    Diagnoses and all orders for this visit:    Type 2 diabetes mellitus without complication, without long-term current use of insulin (H)  -     Hemoglobin A1c  -     FOOT EXAM  -     Albumin Random Urine Quantitative with Creat Ratio    Hyperlipidemia LDL goal <100    Elevated blood pressure " reading without diagnosis of hypertension  -     Basic metabolic panel  (Ca, Cl, CO2, Creat, Gluc, K, Na, BUN)  -     losartan (COZAAR) 50 MG tablet; Take 1 tablet (50 mg) by mouth daily    Type 2 diabetes mellitus with microalbuminuria, without long-term current use of insulin (H)  -     glipiZIDE (GLUCOTROL XL) 10 MG 24 hr tablet; TAKE 1 TABLET BY MOUTH ONCE DAILY  -     metFORMIN (GLUCOPHAGE-XR) 500 MG 24 hr tablet; TAKE 4 TABLETS BY MOUTH DAILY WITH DINNER.    Gastroesophageal reflux disease without esophagitis  -     omeprazole (PRILOSEC) 40 MG DR capsule; Take 1 capsule (40 mg) by mouth daily      work on lifestyle modification  Recheck in 6 mos

## 2019-09-19 ENCOUNTER — OFFICE VISIT (OUTPATIENT)
Dept: URGENT CARE | Facility: RETAIL CLINIC | Age: 39
End: 2019-09-19
Payer: COMMERCIAL

## 2019-09-19 VITALS — SYSTOLIC BLOOD PRESSURE: 140 MMHG | DIASTOLIC BLOOD PRESSURE: 81 MMHG | HEART RATE: 84 BPM | TEMPERATURE: 97.9 F

## 2019-09-19 DIAGNOSIS — J01.90 ACUTE SINUSITIS WITH COEXISTING CONDITION, NEED PROPHYLACTIC TREATMENT: Primary | ICD-10-CM

## 2019-09-19 PROCEDURE — 99213 OFFICE O/P EST LOW 20 MIN: CPT | Performed by: PHYSICIAN ASSISTANT

## 2019-09-19 RX ORDER — FLUTICASONE PROPIONATE 50 MCG
1 SPRAY, SUSPENSION (ML) NASAL DAILY
Qty: 16 ML | Refills: 3 | Status: SHIPPED | OUTPATIENT
Start: 2019-09-19 | End: 2020-01-31

## 2019-09-19 RX ORDER — DOXYCYCLINE 100 MG/1
100 CAPSULE ORAL 2 TIMES DAILY
Qty: 14 CAPSULE | Refills: 0 | Status: SHIPPED | OUTPATIENT
Start: 2019-09-19 | End: 2020-01-31

## 2019-09-19 RX ORDER — PRENATAL VIT 91/IRON/FOLIC/DHA 28-975-200
COMBINATION PACKAGE (EA) ORAL
Refills: 1 | Status: ON HOLD | COMMUNITY
Start: 2018-11-20 | End: 2023-11-11

## 2019-09-19 ASSESSMENT — ENCOUNTER SYMPTOMS
WHEEZING: 0
RHINORRHEA: 1
SORE THROAT: 1
HEADACHES: 1
CHILLS: 0
EYE REDNESS: 0
DIAPHORESIS: 0
COUGH: 1
SINUS PRESSURE: 1
SINUS PAIN: 1
FACIAL SWELLING: 0
ADENOPATHY: 0
FATIGUE: 0
FEVER: 0
EYE DISCHARGE: 0

## 2019-09-19 NOTE — PATIENT INSTRUCTIONS
Doxycycline twice daily for 7 days.  Avoid Sudafed or other decongestants as these will raise your blood pressure.  Flonase (fluticasone) 2 sprays in each nostril daily until symptoms resolve, then continue 1 spray in each nostril for at least 5 more days.  Take Tylenol or an NSAID such as ibuprofen or naproxen as needed for pain.  May use netti pot with bottled or distilled water and saline packets to flush sinuses.  Saline drops or nasal sprays may loosen mucus.  Sit in the bathroom with the door closed and hot shower running to loosen mucus.  Contact primary care clinic if you do not have any relief from your symptoms after 10 days.

## 2019-09-19 NOTE — PROGRESS NOTES
"Chief Complaint   Patient presents with     Sinus Problem     4 days; Left side pressure; has tried sudafed     Nasal Congestion     2 weeks     Throat Pain     began 2 weeks ago, now is mild     SUBJECTIVE:  Geoffrey Wilson is a 39 year old male here with concerns about a sinus infection.  He states onset of symptoms was almost 2 weeks ago.    Course of illness is worsening. Symptoms started with a \"cold\" for about 1 week, then sinus pain developed and worsened. His other cold symptoms are improving but the pain in his right maxillary sinus is worsening.  Severity moderate  He has had right sided maxillary pressure as well as nasal congestion, rhinorrhea, cough , sore throat, headache and post-nasal drainage.  Predisposing factors include none.  Recent treatment has included: Sudafed Sinus.     No past medical history on file.    Current Outpatient Medications on File Prior to Visit:  glipiZIDE (GLUCOTROL XL) 10 MG 24 hr tablet TAKE 1 TABLET BY MOUTH ONCE DAILY   losartan (COZAAR) 50 MG tablet Take 1 tablet (50 mg) by mouth daily   metFORMIN (GLUCOPHAGE-XR) 500 MG 24 hr tablet TAKE 4 TABLETS BY MOUTH DAILY WITH DINNER.   omeprazole (PRILOSEC) 40 MG DR capsule Take 1 capsule (40 mg) by mouth daily   atorvastatin (LIPITOR) 20 MG tablet TAKE 1 TABLET(20 MG) BY MOUTH DAILY (Patient not taking: Reported on 9/19/2019)   ranitidine (ZANTAC) 300 MG tablet    sildenafil (REVATIO) 20 MG tablet Take 1 tablet (20 mg) by mouth 3 times daily 2-5 tabs daily prn   terbinafine (LAMISIL) 1 % external cream CHARLINE TOPICALLY BID FOR FUNGAL INFECTION NOT RESOLVED WITH OTHER ANTIFUNGALS     No current facility-administered medications on file prior to visit.   Social History     Tobacco Use     Smoking status: Never Smoker     Smokeless tobacco: Never Used   Substance Use Topics     Alcohol use: Yes     Alcohol/week: 0.0 oz     Comment: 2 times/ month     Allergies   Allergen Reactions     Penicillins Rash     Review of Systems "   Constitutional: Negative for chills, diaphoresis, fatigue and fever.   HENT: Positive for congestion, rhinorrhea, sinus pressure, sinus pain (right maxillary) and sore throat. Negative for ear pain, facial swelling and postnasal drip.    Eyes: Negative for discharge and redness.   Respiratory: Positive for cough. Negative for wheezing.    Neurological: Positive for headaches.   Hematological: Negative for adenopathy.     OBJECTIVE:  BP (!) 140/81 (BP Location: Left arm)   Pulse 84   Temp 97.9  F (36.6  C) (Oral)   GENERAL APPEARANCE: healthy, alert and no distress  EYES: PERRL, conjunctiva clear  HENT: Pain with palpation over right maxillary sinus. Ear canals normal TMs with mild serous effusions bilaterally. Nasal turbinates edematous bilaterally. Posterior pharynx is not erythematous.  NECK: supple, nontender, no lymphadenopathy  RESP: lungs clear to auscultation - no rales, rhonchi or wheezes  CV: regular rates and rhythm, normal S1 S2, no murmur noted    ASSESSMENT:    ICD-10-CM    1. Acute sinusitis with coexisting condition, need prophylactic treatment J01.90 doxycycline hyclate (VIBRAMYCIN) 100 MG capsule     fluticasone (FLONASE) 50 MCG/ACT nasal spray     PLAN:   Patient Instructions   Doxycycline twice daily for 7 days.  Avoid Sudafed or other decongestants as these will raise your blood pressure.  Flonase (fluticasone) 2 sprays in each nostril daily until symptoms resolve, then continue 1 spray in each nostril for at least 5 more days.  Take Tylenol or an NSAID such as ibuprofen or naproxen as needed for pain.  May use netti pot with bottled or distilled water and saline packets to flush sinuses.  Saline drops or nasal sprays may loosen mucus.  Sit in the bathroom with the door closed and hot shower running to loosen mucus.  Contact primary care clinic if you do not have any relief from your symptoms after 10 days.    Follow up with primary care provider with any problems, questions or concerns or if  symptoms worsen or fail to improve. Patient agreed to plan and verbalized understanding.    Claudia Loyola PA-C  [facility]

## 2019-09-20 ENCOUNTER — TRANSFERRED RECORDS (OUTPATIENT)
Dept: HEALTH INFORMATION MANAGEMENT | Facility: CLINIC | Age: 39
End: 2019-09-20

## 2020-01-11 DIAGNOSIS — R03.0 ELEVATED BLOOD PRESSURE READING WITHOUT DIAGNOSIS OF HYPERTENSION: ICD-10-CM

## 2020-01-13 NOTE — TELEPHONE ENCOUNTER
"Requested Prescriptions   Pending Prescriptions Disp Refills     losartan (COZAAR) 50 MG tablet [Pharmacy Med Name: LOSARTAN 50MG TABLETS] 90 tablet 1     Sig: TAKE 1 TABLET(50 MG) BY MOUTH DAILY   Last Written Prescription Date:  1-11-20  Last Fill Quantity: 90,  # refills: 1   Last office visit: 7/18/2019 with prescribing provider:  7-18-19   Future Office Visit:      Angiotensin-II Receptors Failed - 1/11/2020  4:56 PM        Failed - Last blood pressure under 140/90 in past 12 months     BP Readings from Last 3 Encounters:   09/19/19 (!) 140/81   07/18/19 138/85   05/14/19 114/80                 Passed - Recent (12 mo) or future (30 days) visit within the authorizing provider's specialty     Patient has had an office visit with the authorizing provider or a provider within the authorizing providers department within the previous 12 mos or has a future within next 30 days. See \"Patient Info\" tab in inbasket, or \"Choose Columns\" in Meds & Orders section of the refill encounter.              Passed - Medication is active on med list        Passed - Patient is age 18 or older        Passed - Normal serum creatinine on file in past 12 months     Recent Labs   Lab Test 07/18/19  1609   CR 1.14             Passed - Normal serum potassium on file in past 12 months     Recent Labs   Lab Test 07/18/19  1609   POTASSIUM 3.9                    "

## 2020-01-14 NOTE — TELEPHONE ENCOUNTER
Routing refill request to provider for review/approval because:  Labs out of range:  BP.  Patient due for appt. Pended for 1 month with reminder

## 2020-01-15 RX ORDER — LOSARTAN POTASSIUM 50 MG/1
TABLET ORAL
Qty: 30 TABLET | Refills: 0 | Status: SHIPPED | OUTPATIENT
Start: 2020-01-15 | End: 2020-01-31

## 2020-01-20 DIAGNOSIS — R80.9 TYPE 2 DIABETES MELLITUS WITH MICROALBUMINURIA, WITHOUT LONG-TERM CURRENT USE OF INSULIN (H): ICD-10-CM

## 2020-01-20 DIAGNOSIS — E11.29 TYPE 2 DIABETES MELLITUS WITH MICROALBUMINURIA, WITHOUT LONG-TERM CURRENT USE OF INSULIN (H): ICD-10-CM

## 2020-01-21 NOTE — TELEPHONE ENCOUNTER
"Requested Prescriptions   Pending Prescriptions Disp Refills     metFORMIN (GLUCOPHAGE-XR) 500 MG 24 hr tablet [Pharmacy Med Name: METFORMIN ER 500MG 24HR TABS] 360 tablet 0     Sig: TAKE FOUR TABLETS BY MOUTH ONCE DAILY WITH DINNER  Last Written Prescription Date:  1/20/20  Last Fill Quantity: 360,  # refills: 0   Last office visit: 7/18/2019 with prescribing provider:  MACARENA Deutsch   Future Office Visit:         Biguanide Agents Failed - 1/20/2020  5:26 PM        Failed - Blood pressure less than 140/90 in past 6 months     BP Readings from Last 3 Encounters:   09/19/19 (!) 140/81   07/18/19 138/85   05/14/19 114/80                 Failed - Patient has documented LDL within the past 12 mos.     Recent Labs   Lab Test 11/20/18  1523   LDL 78             Failed - Patient has documented A1c within the specified period of time.     If HgbA1C is 8 or greater, it needs to be on file within the past 3 months.  If less than 8, must be on file within the past 6 months.     Recent Labs   Lab Test 07/18/19  1609   A1C 5.8*             Failed - Recent (6 mo) or future (30 days) visit within the authorizing provider's specialty     Patient had office visit in the last 6 months or has a visit in the next 30 days with authorizing provider or within the authorizing provider's specialty.  See \"Patient Info\" tab in inbasket, or \"Choose Columns\" in Meds & Orders section of the refill encounter.            Passed - Patient has had a Microalbumin in the past 15 mos.     Recent Labs   Lab Test 07/18/19  1608   MICROL 78   UMALCR 34.28*             Passed - Patient is age 10 or older        Passed - Patient's CR is NOT>1.4 OR Patient's EGFR is NOT<45 within past 12 mos.     Recent Labs   Lab Test 07/18/19  1609   GFRESTIMATED 81   GFRESTBLACK >90       Recent Labs   Lab Test 07/18/19  1609   CR 1.14             Passed - Patient does NOT have a diagnosis of CHF.        Passed - Medication is active on med list        glipiZIDE (GLUCOTROL XL) " "10 MG 24 hr tablet [Pharmacy Med Name: GLIPIZIDE ER 10MG TABLETS] 90 tablet 1     Sig: TAKE 1 TABLET BY MOUTH EVERY DAY  Last Written Prescription Date:  1/20/20  Last Fill Quantity: 90,  # refills: 1   Last office visit: 7/18/2019 with prescribing provider:  MACARENA Deutsch   Future Office Visit:         Sulfonylurea Agents Failed - 1/20/2020  5:26 PM        Failed - Blood pressure less than 140/90 in past 6 months     BP Readings from Last 3 Encounters:   09/19/19 (!) 140/81   07/18/19 138/85   05/14/19 114/80                 Failed - Patient has documented LDL within the past 12 mos.     Recent Labs   Lab Test 11/20/18  1523   LDL 78             Failed - Patient has documented A1c within the specified period of time.     If HgbA1C is 8 or greater, it needs to be on file within the past 3 months.  If less than 8, must be on file within the past 6 months.     Recent Labs   Lab Test 07/18/19  1609   A1C 5.8*             Failed - Recent (6 mo) or future (30 days) visit within the authorizing provider's specialty     Patient had office visit in the last 6 months or has a visit in the next 30 days with authorizing provider or within the authorizing provider's specialty.  See \"Patient Info\" tab in inbasket, or \"Choose Columns\" in Meds & Orders section of the refill encounter.            Passed - Patient has had a Microalbumin in the past 15 mos.     Recent Labs   Lab Test 07/18/19  1608   MICROL 78   UMALCR 34.28*             Passed - Medication is active on med list        Passed - Patient is age 18 or older        Passed - Patient has a recent creatinine (normal) within the past 12 mos.     Recent Labs   Lab Test 07/18/19  1609   CR 1.14             "

## 2020-01-21 NOTE — TELEPHONE ENCOUNTER
Routing refill request to provider for review/approval because:  Isabel given x1 and patient did not follow up, please advise    Failed:   Blood pressure less than 140/90 in past 6 months    Patient has documented LDL within the past 12 mos.    Patient has documented A1c within the specified period of time     Sybil Deluca RN, BSN, PHN

## 2020-01-22 RX ORDER — GLIPIZIDE 10 MG/1
TABLET, FILM COATED, EXTENDED RELEASE ORAL
Qty: 30 TABLET | Refills: 0 | Status: SHIPPED | OUTPATIENT
Start: 2020-01-22 | End: 2020-01-31

## 2020-01-22 RX ORDER — METFORMIN HCL 500 MG
TABLET, EXTENDED RELEASE 24 HR ORAL
Qty: 120 TABLET | Refills: 0 | Status: SHIPPED | OUTPATIENT
Start: 2020-01-22 | End: 2020-01-31

## 2020-01-31 ENCOUNTER — OFFICE VISIT (OUTPATIENT)
Dept: FAMILY MEDICINE | Facility: CLINIC | Age: 40
End: 2020-01-31
Payer: COMMERCIAL

## 2020-01-31 VITALS
HEART RATE: 88 BPM | WEIGHT: 315 LBS | SYSTOLIC BLOOD PRESSURE: 139 MMHG | BODY MASS INDEX: 41.75 KG/M2 | RESPIRATION RATE: 20 BRPM | TEMPERATURE: 98.5 F | OXYGEN SATURATION: 97 % | HEIGHT: 73 IN | DIASTOLIC BLOOD PRESSURE: 84 MMHG

## 2020-01-31 DIAGNOSIS — E11.29 TYPE 2 DIABETES MELLITUS WITH MICROALBUMINURIA, WITHOUT LONG-TERM CURRENT USE OF INSULIN (H): ICD-10-CM

## 2020-01-31 DIAGNOSIS — R80.9 TYPE 2 DIABETES MELLITUS WITH MICROALBUMINURIA, WITHOUT LONG-TERM CURRENT USE OF INSULIN (H): ICD-10-CM

## 2020-01-31 DIAGNOSIS — R06.83 SNORING: ICD-10-CM

## 2020-01-31 DIAGNOSIS — E78.5 HYPERLIPIDEMIA LDL GOAL <100: ICD-10-CM

## 2020-01-31 DIAGNOSIS — R03.0 ELEVATED BLOOD PRESSURE READING WITHOUT DIAGNOSIS OF HYPERTENSION: ICD-10-CM

## 2020-01-31 DIAGNOSIS — Z23 IMMUNIZATION DUE: ICD-10-CM

## 2020-01-31 DIAGNOSIS — E11.9 TYPE 2 DIABETES MELLITUS WITHOUT COMPLICATION, WITHOUT LONG-TERM CURRENT USE OF INSULIN (H): Primary | ICD-10-CM

## 2020-01-31 DIAGNOSIS — R06.81 WITNESSED APNEIC SPELLS: ICD-10-CM

## 2020-01-31 LAB — HBA1C MFR BLD: 5.9 % (ref 0–5.6)

## 2020-01-31 PROCEDURE — 99214 OFFICE O/P EST MOD 30 MIN: CPT | Mod: 25 | Performed by: PHYSICIAN ASSISTANT

## 2020-01-31 PROCEDURE — 90715 TDAP VACCINE 7 YRS/> IM: CPT | Performed by: PHYSICIAN ASSISTANT

## 2020-01-31 PROCEDURE — 90471 IMMUNIZATION ADMIN: CPT | Performed by: PHYSICIAN ASSISTANT

## 2020-01-31 PROCEDURE — 83036 HEMOGLOBIN GLYCOSYLATED A1C: CPT | Performed by: PHYSICIAN ASSISTANT

## 2020-01-31 PROCEDURE — 36415 COLL VENOUS BLD VENIPUNCTURE: CPT | Performed by: PHYSICIAN ASSISTANT

## 2020-01-31 RX ORDER — ROSUVASTATIN CALCIUM 20 MG/1
20 TABLET, COATED ORAL DAILY
Qty: 90 TABLET | Refills: 3 | Status: SHIPPED | OUTPATIENT
Start: 2020-01-31 | End: 2021-01-19

## 2020-01-31 RX ORDER — METFORMIN HCL 500 MG
TABLET, EXTENDED RELEASE 24 HR ORAL
Qty: 360 TABLET | Refills: 1 | Status: SHIPPED | OUTPATIENT
Start: 2020-01-31 | End: 2020-07-13

## 2020-01-31 RX ORDER — LOSARTAN POTASSIUM 100 MG/1
100 TABLET ORAL DAILY
Qty: 90 TABLET | Refills: 1 | Status: SHIPPED | OUTPATIENT
Start: 2020-01-31 | End: 2020-07-13

## 2020-01-31 RX ORDER — GLIPIZIDE 10 MG/1
TABLET, FILM COATED, EXTENDED RELEASE ORAL
Qty: 90 TABLET | Refills: 1 | Status: SHIPPED | OUTPATIENT
Start: 2020-01-31 | End: 2020-07-13

## 2020-01-31 ASSESSMENT — MIFFLIN-ST. JEOR: SCORE: 2605.56

## 2020-01-31 NOTE — NURSING NOTE
Screening Questionnaire for Adult Immunization    Are you sick today?   No   Do you have allergies to medications, food, a vaccine component or latex?   No   Have you ever had a serious reaction after receiving a vaccination?   No   Do you have a long-term health problem with heart disease, lung disease, asthma, kidney disease, metabolic disease (e.g. diabetes), anemia, or other blood disorder?   No   Do you have cancer, leukemia, HIV/AIDS, or any other immune system problem?   No   In the past 3 months, have you taken medications that affect  your immune system, such as prednisone, other steroids, or anticancer drugs; drugs for the treatment of rheumatoid arthritis, Crohn s disease, or psoriasis; or have you had radiation treatments?   No   Have you had a seizure, or a brain or other nervous system problem?   No   During the past year, have you received a transfusion of blood or blood     products, or been given immune (gamma) globulin or antiviral drug?   No   For women: Are you pregnant or is there a chance you could become        pregnant during the next month?   No   Have you received any vaccinations in the past 4 weeks?   No     Immunization questionnaire answers were all negative.        Per orders of David Deutsch PA-C , injection of adacel given by Kelly Smyth. Patient instructed to remain in clinic for 15 minutes afterwards, and to report any adverse reaction to me immediately.       Screening performed by Kelly Smyth on 1/31/2020 at 3:31 PM.

## 2020-01-31 NOTE — PROGRESS NOTES
Subjective     Geoffrey Wilson is a 39 year old male who presents to clinic today for the following health issues:    HPI   Diabetes Follow-up      How often are you checking your blood sugar? Not at all    What concerns do you have today about your diabetes? None     Do you have any of these symptoms? (Select all that apply)  Burning in feet    Fatigue. Snoring. Witnessed apneic episodes. Snort awakenings.   BP Readings from Last 2 Encounters:   01/31/20 139/84   09/19/19 (!) 140/81     Hemoglobin A1C (%)   Date Value   01/31/2020 5.9 (H)   07/18/2019 5.8 (H)     LDL Cholesterol Calculated (mg/dL)   Date Value   11/20/2018 78   10/19/2016 134 (H)           How many servings of fruits and vegetables do you eat daily?  2-3    On average, how many sweetened beverages do you drink each day (Examples: soda, juice, sweet tea, etc.  Do NOT count diet or artificially sweetened beverages)?   1    How many days per week do you exercise enough to make your heart beat faster? 4    How many minutes a day do you exercise enough to make your heart beat faster? 30 - 60    How many days per week do you miss taking your medication? 0        Patient Active Problem List   Diagnosis     Cellulitis-scrotum     Morbid obesity due to excess calories (H)     SIRS (systemic inflammatory response syndrome) (H)     Scrotal infection     Elevated blood pressure reading without diagnosis of hypertension     Hyperlipidemia LDL goal <100     Type 2 diabetes mellitus without complication, without long-term current use of insulin (H)     Type 2 diabetes mellitus with microalbuminuria, without long-term current use of insulin (H)     Past Surgical History:   Procedure Laterality Date     HC REPAIR ING HERNIA,5+Y/O,REDUCIBL  Age 6       Social History     Tobacco Use     Smoking status: Never Smoker     Smokeless tobacco: Never Used   Substance Use Topics     Alcohol use: Yes     Alcohol/week: 0.0 standard drinks     Comment: 2 times/ month     Family  History   Problem Relation Age of Onset     Diabetes Father      Lipids Father      Hypertension Mother      Heart Disease Brother          Current Outpatient Medications   Medication Sig Dispense Refill     glipiZIDE (GLUCOTROL XL) 10 MG 24 hr tablet TAKE 1 TABLET BY MOUTH EVERY DAY 30 tablet 0     losartan (COZAAR) 50 MG tablet TAKE 1 TABLET(50 MG) BY MOUTH DAILY 30 tablet 0     metFORMIN (GLUCOPHAGE-XR) 500 MG 24 hr tablet TAKE FOUR TABLETS BY MOUTH ONCE DAILY WITH DINNER 120 tablet 0     omeprazole (PRILOSEC) 40 MG DR capsule Take 1 capsule (40 mg) by mouth daily 90 capsule 3     rosuvastatin (CRESTOR) 20 MG tablet Take 1 tablet (20 mg) by mouth daily 90 tablet 3     terbinafine (LAMISIL) 1 % external cream CHARLINE TOPICALLY BID FOR FUNGAL INFECTION NOT RESOLVED WITH OTHER ANTIFUNGALS  1     Allergies   Allergen Reactions     Penicillins Rash     Recent Labs   Lab Test 01/31/20  1435 07/18/19  1609 02/04/19  1526 11/20/18  1523 09/18/18 05/11/18  1621 10/19/16  1134   A1C 5.9* 5.8*  --  6.2*  --    < > 9.4* 6.0   LDL  --   --   --  78  --   --   --  134*   HDL  --   --   --  39*  --   --   --  40   TRIG  --   --   --  113  --   --   --  245*   CR  --  1.14  --   --  0.95  --  0.98 0.98   GFRESTIMATED  --  81  --   --  >60  --  85 86   GFRESTBLACK  --  >90  --   --  >60  --  >90 >90  African American GFR Calc     POTASSIUM  --  3.9  --   --  4.0  --  4.2 4.0   TSH  --   --  2.38  --   --   --  1.97  --     < > = values in this interval not displayed.      BP Readings from Last 3 Encounters:   01/31/20 139/84   09/19/19 (!) 140/81   07/18/19 138/85    Wt Readings from Last 3 Encounters:   01/31/20 (!) 163.9 kg (361 lb 6.4 oz)   07/18/19 (!) 164.7 kg (363 lb)   05/14/19 (!) 157.9 kg (348 lb)                      Reviewed and updated as needed this visit by Provider         Review of Systems   ROS COMP: Constitutional, HEENT, cardiovascular, pulmonary, GI, , musculoskeletal, neuro, skin, endocrine and psych systems  "are negative, except as otherwise noted.      Objective    /84   Pulse 88   Temp 98.5  F (36.9  C) (Oral)   Resp 20   Ht 1.85 m (6' 0.84\")   Wt (!) 163.9 kg (361 lb 6.4 oz)   SpO2 97%   BMI 47.90 kg/m    Body mass index is 47.9 kg/m .  Physical Exam     Eye exam - right eye normal lid, conjunctiva, cornea, pupil and fundus, left eye normal lid, conjunctiva, cornea, pupil and fundus.  Thyroid not palpable, not enlarged, no nodules detected.  CHEST:chest clear to IPPA, no tachypnea, retractions or cyanosis and S1, S2 normal, no murmur, no gallop, rate regular.  Foot exam - both sides normal; no swelling, tenderness or skin or vascular lesions. Color and temperature is normal. Sensation is intact. Peripheral pulses are palpable. Toenails are normal.    Geoffrey was seen today for diabetes.    Diagnoses and all orders for this visit:    Type 2 diabetes mellitus without complication, without long-term current use of insulin (H)  -     HEMOGLOBIN A1C    Hyperlipidemia LDL goal <100  -     JUST IN CASE  -     rosuvastatin (CRESTOR) 20 MG tablet; Take 1 tablet (20 mg) by mouth daily    Immunization due  -     TDAP, IM (10 - 64 YRS) - Adacel    Snoring  -     SLEEP EVALUATION & MANAGEMENT REFERRAL - [facility] [place]  [phone] (Age 15 and up); Future    Witnessed apneic spells  -     SLEEP EVALUATION & MANAGEMENT REFERRAL - [facility] [place]  [phone] (Age 15 and up); Future    Type 2 diabetes mellitus with microalbuminuria, without long-term current use of insulin (H)  -     glipiZIDE (GLUCOTROL XL) 10 MG 24 hr tablet; TAKE 1 TABLET BY MOUTH EVERY DAY  -     metFORMIN (GLUCOPHAGE-XR) 500 MG 24 hr tablet; TAKE FOUR TABLETS BY MOUTH ONCE DAILY WITH DINNER    Elevated blood pressure reading without diagnosis of hypertension  -     losartan (COZAAR) 100 MG tablet; Take 1 tablet (100 mg) by mouth daily      work on lifestyle modification  Recheck in 6 mos "

## 2020-02-10 DIAGNOSIS — R03.0 ELEVATED BLOOD PRESSURE READING WITHOUT DIAGNOSIS OF HYPERTENSION: ICD-10-CM

## 2020-02-10 NOTE — TELEPHONE ENCOUNTER
"Requested Prescriptions   Pending Prescriptions Disp Refills     losartan (COZAAR) 50 MG tablet [Pharmacy Med Name: LOSARTAN 50MG TABLETS] 30 tablet 0     Sig: TAKE 1 TABLET(50 MG) BY MOUTH DAILY   Last Written Prescription Date:  2-10-20  Last Fill Quantity: 30,  # refills: 0   Last office visit: 1/31/2020 with prescribing provider:  1-31-20   Future Office Visit:      Angiotensin-II Receptors Passed - 2/10/2020  9:45 AM        Passed - Last blood pressure under 140/90 in past 12 months     BP Readings from Last 3 Encounters:   01/31/20 139/84   09/19/19 (!) 140/81   07/18/19 138/85                 Passed - Recent (12 mo) or future (30 days) visit within the authorizing provider's specialty     Patient has had an office visit with the authorizing provider or a provider within the authorizing providers department within the previous 12 mos or has a future within next 30 days. See \"Patient Info\" tab in inbasket, or \"Choose Columns\" in Meds & Orders section of the refill encounter.              Passed - Medication is active on med list        Passed - Patient is age 18 or older        Passed - Normal serum creatinine on file in past 12 months     Recent Labs   Lab Test 07/18/19  1609   CR 1.14             Passed - Normal serum potassium on file in past 12 months     Recent Labs   Lab Test 07/18/19  1609   POTASSIUM 3.9                    "

## 2020-02-12 RX ORDER — LOSARTAN POTASSIUM 50 MG/1
TABLET ORAL
Qty: 30 TABLET | Refills: 0 | OUTPATIENT
Start: 2020-02-12

## 2020-03-11 PROBLEM — E11.29 TYPE 2 DIABETES MELLITUS WITH MICROALBUMINURIA, WITHOUT LONG-TERM CURRENT USE OF INSULIN (H): Chronic | Status: ACTIVE | Noted: 2018-11-20

## 2020-03-11 PROBLEM — R80.9 TYPE 2 DIABETES MELLITUS WITH MICROALBUMINURIA, WITHOUT LONG-TERM CURRENT USE OF INSULIN (H): Chronic | Status: ACTIVE | Noted: 2018-11-20

## 2020-03-11 PROBLEM — E78.5 HYPERLIPIDEMIA LDL GOAL <100: Chronic | Status: ACTIVE | Noted: 2018-05-11

## 2020-03-13 ENCOUNTER — OFFICE VISIT (OUTPATIENT)
Dept: SLEEP MEDICINE | Facility: CLINIC | Age: 40
End: 2020-03-13
Attending: PHYSICIAN ASSISTANT
Payer: COMMERCIAL

## 2020-03-13 VITALS
OXYGEN SATURATION: 95 % | SYSTOLIC BLOOD PRESSURE: 138 MMHG | DIASTOLIC BLOOD PRESSURE: 86 MMHG | HEIGHT: 73 IN | HEART RATE: 80 BPM | WEIGHT: 315 LBS | BODY MASS INDEX: 41.75 KG/M2

## 2020-03-13 DIAGNOSIS — I10 ESSENTIAL HYPERTENSION: ICD-10-CM

## 2020-03-13 DIAGNOSIS — R06.89 DYSPNEA AND RESPIRATORY ABNORMALITY: Primary | ICD-10-CM

## 2020-03-13 DIAGNOSIS — R06.81 WITNESSED APNEIC SPELLS: ICD-10-CM

## 2020-03-13 DIAGNOSIS — G47.30 SLEEP APNEA, UNSPECIFIED TYPE: ICD-10-CM

## 2020-03-13 DIAGNOSIS — R06.83 SNORING: ICD-10-CM

## 2020-03-13 DIAGNOSIS — R53.81 MALAISE AND FATIGUE: ICD-10-CM

## 2020-03-13 DIAGNOSIS — E66.813 CLASS 3 SEVERE OBESITY DUE TO EXCESS CALORIES WITH SERIOUS COMORBIDITY AND BODY MASS INDEX (BMI) OF 45.0 TO 49.9 IN ADULT (H): ICD-10-CM

## 2020-03-13 DIAGNOSIS — R53.83 MALAISE AND FATIGUE: ICD-10-CM

## 2020-03-13 DIAGNOSIS — R06.00 DYSPNEA AND RESPIRATORY ABNORMALITY: Primary | ICD-10-CM

## 2020-03-13 DIAGNOSIS — Z20.822 COVID-19 RULED OUT: ICD-10-CM

## 2020-03-13 DIAGNOSIS — E66.01 CLASS 3 SEVERE OBESITY DUE TO EXCESS CALORIES WITH SERIOUS COMORBIDITY AND BODY MASS INDEX (BMI) OF 45.0 TO 49.9 IN ADULT (H): ICD-10-CM

## 2020-03-13 PROCEDURE — 99215 OFFICE O/P EST HI 40 MIN: CPT | Performed by: PHYSICIAN ASSISTANT

## 2020-03-13 ASSESSMENT — MIFFLIN-ST. JEOR: SCORE: 2665.33

## 2020-03-13 NOTE — NURSING NOTE
"Chief Complaint   Patient presents with     Sleep Problem     Not sleeping well at night, fiance noticed that I stop breathing when I sleep. I have also started to snore more often.       Initial /86   Pulse 80   Ht 1.854 m (6' 1\")   Wt (!) 169.6 kg (374 lb)   SpO2 95%   BMI 49.34 kg/m   Estimated body mass index is 49.34 kg/m  as calculated from the following:    Height as of this encounter: 1.854 m (6' 1\").    Weight as of this encounter: 169.6 kg (374 lb).    Medication Reconciliation: complete    Neck circumference: 18.75 inches / 48 centimeters.        "

## 2020-03-13 NOTE — PROGRESS NOTES
Sleep Consultation:    Date on this visit: 3/13/2020    Geoffrey Wilson is sent by David Deutsch for a sleep consultation regarding snoring, witnessed apnea and excessive daytime sleepiness.    Primary Physician: David Deutsch     Chief Complaint   Patient presents with     Sleep Problem     Not sleeping well at night, vinicio noticed that I stop breathing when I sleep. I have also started to snore more often.       Geoffrey goes to sleep at 10:30 PM during the week. He wakes up at 5:30 AM with an alarm. He falls asleep in 10 minutes.  Geoffrey denies difficulty falling asleep.  He wakes up 2 times a night for 5 minutes before falling back to sleep. Geoffrey wakes up to go to the bathroom.  On weekends, Geoffrey goes to sleep at 11:00 PM.  He wakes up at 8:00 AM without an alarm. He falls asleep in 10 minutes.  Patient gets an average of 6.5 hours of sleep per night.     Patient does use electronics in bed and watch TV in bed and does not read in bed.     Geoffrey does not do shift work.     Geoffrey does snore every night and snoring is loud. Patient does have a regular bed partner. There is report of snoring.  He does have witnessed apneas. They never sleep separately.  Patient sleeps on his side. He has occasional morning headaches, denies morning confusion and restless legs. Geoffrey denies any bruxism, sleep walking, sleep talking, dream enactment, sleep paralysis, cataplexy and hypnogogic/hypnopompic hallucinations.    Geoffrey denies difficulty breathing through his nose, claustrophobia and reflux at night.      Geoffrey has gained 0-5 pounds in the last year.  Patient describes themself as a night person.  He would prefer to go to sleep at 11:00 PM and wake up at 7:00 AM.  Patient's [place] Sleepiness score 5/24 inconsistent with excessive  daytime sleepiness.  He reports fatigue.    Geoffrey naps 2 times per week for 60 minutes, feels refreshed after naps. He takes some inadvertant naps.  He denies falling asleep while driving.   Patient was counseled on the importance of driving while alert, to pull over if drowsy, or nap before getting into the vehicle if sleepy.  He uses 1 soda/day. Last caffeine intake is usually before 5 PM.    Allergies:    Allergies   Allergen Reactions     Penicillins Rash       Medications:    Current Outpatient Medications   Medication Sig Dispense Refill     glipiZIDE (GLUCOTROL XL) 10 MG 24 hr tablet TAKE 1 TABLET BY MOUTH EVERY DAY 90 tablet 1     losartan (COZAAR) 100 MG tablet Take 1 tablet (100 mg) by mouth daily 90 tablet 1     metFORMIN (GLUCOPHAGE-XR) 500 MG 24 hr tablet TAKE FOUR TABLETS BY MOUTH ONCE DAILY WITH DINNER 360 tablet 1     omeprazole (PRILOSEC) 40 MG DR capsule Take 1 capsule (40 mg) by mouth daily 90 capsule 3     rosuvastatin (CRESTOR) 20 MG tablet Take 1 tablet (20 mg) by mouth daily 90 tablet 3     terbinafine (LAMISIL) 1 % external cream CHARLINE TOPICALLY BID FOR FUNGAL INFECTION NOT RESOLVED WITH OTHER ANTIFUNGALS  1       Problem List:  Patient Active Problem List    Diagnosis Date Noted     Type 2 diabetes mellitus with microalbuminuria, without long-term current use of insulin (H) 11/20/2018     Priority: Medium     Elevated blood pressure reading without diagnosis of hypertension 05/11/2018     Priority: Medium     Hyperlipidemia LDL goal <100 05/11/2018     Priority: Medium     Morbid obesity due to excess calories (H) 08/15/2016     Priority: Medium        Past Medical/Surgical History:  Past Medical History:   Diagnosis Date     Cellulitis-scrotum 8/15/2016     SIRS (systemic inflammatory response syndrome) (H) 8/16/2016     Past Surgical History:   Procedure Laterality Date     HC REPAIR ING HERNIA,5+Y/O,REDUCIBL  Age 6       Social History:  Social History     Socioeconomic History     Marital status: Single     Spouse name: Not on file     Number of children: Not on file     Years of education: Not on file     Highest education level: Not on file   Occupational History     Not on  file   Social Needs     Financial resource strain: Not on file     Food insecurity     Worry: Not on file     Inability: Not on file     Transportation needs     Medical: Not on file     Non-medical: Not on file   Tobacco Use     Smoking status: Never Smoker     Smokeless tobacco: Never Used   Substance and Sexual Activity     Alcohol use: Yes     Alcohol/week: 0.0 standard drinks     Comment: 2 times/ month     Drug use: No     Sexual activity: Never   Lifestyle     Physical activity     Days per week: Not on file     Minutes per session: Not on file     Stress: Not on file   Relationships     Social connections     Talks on phone: Not on file     Gets together: Not on file     Attends Confucianism service: Not on file     Active member of club or organization: Not on file     Attends meetings of clubs or organizations: Not on file     Relationship status: Not on file     Intimate partner violence     Fear of current or ex partner: Not on file     Emotionally abused: Not on file     Physically abused: Not on file     Forced sexual activity: Not on file   Other Topics Concern     Parent/sibling w/ CABG, MI or angioplasty before 65F 55M? Not Asked   Social History Narrative     Not on file       Family History:  Family History   Problem Relation Age of Onset     Diabetes Father      Lipids Father      Hypertension Mother      Heart Disease Brother        Review of Systems:  A complete review of systems reviewed by me is negative with the exeption of what has been mentioned in the history of present illness.  CONSTITUTIONAL: NEGATIVE for weight gain/loss, fever, chills, sweats or night sweats.  EYES: NEGATIVE for changes in vision, blind spots, double vision.  ENT: NEGATIVE for ear pain, sore throat, sinus pain, post-nasal drip, runny nose, bloody nose  CARDIAC: NEGATIVE for fast heartbeats or fluttering in chest, chest pain or pressure, breathlessness when lying flat, swollen legs or swollen feet.  NEUROLOGIC: NEGATIVE  headaches, weakness or numbness in the arms or legs.  DERMATOLOGIC: NEGATIVE for rashes, new moles or change in mole(s)  PULMONARY:  POSITIVE for  SOB with activity, productive cough and wheezing  and NEGATIVE for  SOB at rest  GASTROINTESTINAL: NEGATIVE for nausea or vomitting, loose or watery stools, fat or grease in stools, constipation, abdominal pain, bowel movements black in color or blood noted.  GENITOURINARY: NEGATIVE for pain during urination, blood in urine, urinating more frequently than usual, irregular menstrual periods.  MUSCULOSKELETAL: NEGATIVE for muscle pain, bone or joint pain, swollen joints.  ENDOCRINE: NEGATIVE for increased thirst or urination, diabetes.  LYMPHATIC: NEGATIVE for swollen lymph nodes, lumps or bumps in the breasts or nipple discharge.    Physical Examination:  Vitals: There were no vitals taken for this visit.  BMI= There is no height or weight on file to calculate BMI.         [place] Total Score 3/13/2020   Total score - [place] 5       RICARDA Total Score: 10 (03/13/20 0900)    GENERAL APPEARANCE: alert and no distress  EYES: Eyes grossly normal to inspection, PERRL and conjunctivae and sclerae normal  HENT: ear canals and TM's normal, nose and mouth without ulcers or lesions, oropharynx crowded and tongue base enlarged  NECK: no adenopathy, no asymmetry, masses, or scars and thyroid normal to palpation  RESP: lungs clear to auscultation - no rales, rhonchi or wheezes  CV: regular rates and rhythm, normal S1 S2, no S3 or S4 and no murmur, click or rub  MS: extremities normal- no gross deformities noted  NEURO: Normal strength and tone, mentation intact and speech normal  PSYCH: mentation appears normal and affect normal/bright  Mallampati Class: III.  Tonsillar Stage: 2  visible at pillars.    Last Comprehensive Metabolic Panel:  Sodium   Date Value Ref Range Status   07/18/2019 139 133 - 144 mmol/L Final     Potassium   Date Value Ref Range Status   07/18/2019 3.9 3.4 - 5.3  mmol/L Final     Chloride   Date Value Ref Range Status   07/18/2019 106 94 - 109 mmol/L Final     Carbon Dioxide   Date Value Ref Range Status   07/18/2019 26 20 - 32 mmol/L Final     Anion Gap   Date Value Ref Range Status   07/18/2019 7 3 - 14 mmol/L Final     Glucose   Date Value Ref Range Status   07/18/2019 105 (H) 70 - 99 mg/dL Final     Urea Nitrogen   Date Value Ref Range Status   07/18/2019 18 7 - 30 mg/dL Final     Creatinine   Date Value Ref Range Status   07/18/2019 1.14 0.66 - 1.25 mg/dL Final     GFR Estimate   Date Value Ref Range Status   07/18/2019 81 >60 mL/min/[1.73_m2] Final     Comment:     Non  GFR Calc  Starting 12/18/2018, serum creatinine based estimated GFR (eGFR) will be   calculated using the Chronic Kidney Disease Epidemiology Collaboration   (CKD-EPI) equation.       Calcium   Date Value Ref Range Status   07/18/2019 9.2 8.5 - 10.1 mg/dL Final     TSH   Date Value Ref Range Status   02/04/2019 2.38 0.40 - 4.00 mU/L Final     Impression/Plan:  1. Probable obstructive sleep apnea with possible coexisting hypoventilation based on BMI of 49, morning headaches, loud snoring, witnessed apnea, fatigue, crowded oropharynx, large neck circumference and co-morbid HTN. The STOP-BANG score is 7/8. Recommend Polysomnogram (using 4% desaturation/Medicare/2012 AASM 1B scoring rules) with transcutaneous C02 monitoring and pre-study VBG to evaluate for obstructive sleep apnea, hypoventilation and hypoxemia. Patient is a poor candidate for Home Sleep Testing due to morbid obesity (BMI >45) and potential for obesity hypoventilation.  2. Advised him against drowsy driving.    3. Recommend weight management.       Literature provided regarding sleep apnea.      He will follow up with me in approximately two weeks after his sleep study has been completed to review the results and discuss plan of care.       Polysomnography reviewed.  Obstructive sleep apnea reviewed.  Complications of  untreated sleep apnea were reviewed.    Maria Ines Huggins PA-C    CC: David Deutsch

## 2020-03-13 NOTE — PATIENT INSTRUCTIONS
Your BMI is Body mass index is 49.34 kg/m .  Weight management is a personal decision.  If you are interested in exploring weight loss strategies, the following discussion covers the approaches that may be successful. Body mass index (BMI) is one way to tell whether you are at a healthy weight, overweight, or obese. It measures your weight in relation to your height.  A BMI of 18.5 to 24.9 is in the healthy range. A person with a BMI of 25 to 29.9 is considered overweight, and someone with a BMI of 30 or greater is considered obese. More than two-thirds of American adults are considered overweight or obese.  Being overweight or obese increases the risk for further weight gain. Excess weight may lead to heart disease and diabetes.  Creating and following plans for healthy eating and physical activity may help you improve your health.  Weight control is part of healthy lifestyle and includes exercise, emotional health, and healthy eating habits. Careful eating habits lifelong are the mainstay of weight control. Though there are significant health benefits from weight loss, long-term weight loss with diet alone may be very difficult to achieve- studies show long-term success with dietary management in less than 10% of people. Attaining a healthy weight may be especially difficult to achieve in those with severe obesity. In some cases, medications, devices and surgical management might be considered.  What can you do?  If you are overweight or obese and are interested in methods for weight loss, you should discuss this with your provider.     Consider reducing daily calorie intake by 500 calories.     Keep a food journal.     Avoiding skipping meals, consider cutting portions instead.    Diet combined with exercise helps maintain muscle while optimizing fat loss. Strength training is particularly important for building and maintaining muscle mass. Exercise helps reduce stress, increase energy, and improves fitness.  Increasing exercise without diet control, however, may not burn enough calories to loose weight.       Start walking three days a week 10-20 minutes at a time    Work towards walking thirty minutes five days a week     Eventually, increase the speed of your walking for 1-2 minutes at time    In addition, we recommend that you review healthy lifestyles and methods for weight loss available through the National Institutes of Health patient information sites:  http://win.niddk.nih.gov/publications/index.htm    And look into health and wellness programs that may be available through your health insurance provider, employer, local community center, or kay club.    Weight management plan: Patient was referred to their PCP to discuss a diet and exercise plan.

## 2020-04-03 ENCOUNTER — DOCUMENTATION ONLY (OUTPATIENT)
Dept: LAB | Facility: CLINIC | Age: 40
End: 2020-04-03

## 2020-05-11 ENCOUNTER — TELEPHONE (OUTPATIENT)
Dept: FAMILY MEDICINE | Facility: CLINIC | Age: 40
End: 2020-05-11

## 2020-05-11 DIAGNOSIS — N52.9 ERECTILE DYSFUNCTION, UNSPECIFIED ERECTILE DYSFUNCTION TYPE: ICD-10-CM

## 2020-05-11 NOTE — TELEPHONE ENCOUNTER
Spoke with pharmacy.  Per pharmacy patient is requesting a refill on med that was transferred to them and instructions need clarification.  Med was discontinued.  Will send to PCP to advise on sending new rx with one set of instructions.

## 2020-05-11 NOTE — TELEPHONE ENCOUNTER
Reason for Call:  Other prescription    Detailed comments: [facility] called pt needs rx sildenafil 20 mg clarification.  Please call phamacist and discuss.      Phone Number Patient can be reached at: Other phone number:  [facility]    Best Time: any    Can we leave a detailed message on this number? Not Applicable    Call taken on 5/11/2020 at 10:53 AM by Hazel Knox

## 2020-05-12 RX ORDER — SILDENAFIL CITRATE 20 MG/1
TABLET ORAL
Qty: 30 TABLET | Refills: 11 | Status: SHIPPED | OUTPATIENT
Start: 2020-05-12 | End: 2021-09-08

## 2020-07-11 DIAGNOSIS — R80.9 TYPE 2 DIABETES MELLITUS WITH MICROALBUMINURIA, WITHOUT LONG-TERM CURRENT USE OF INSULIN (H): ICD-10-CM

## 2020-07-11 DIAGNOSIS — E11.29 TYPE 2 DIABETES MELLITUS WITH MICROALBUMINURIA, WITHOUT LONG-TERM CURRENT USE OF INSULIN (H): ICD-10-CM

## 2020-07-11 DIAGNOSIS — R03.0 ELEVATED BLOOD PRESSURE READING WITHOUT DIAGNOSIS OF HYPERTENSION: ICD-10-CM

## 2020-07-13 RX ORDER — GLIPIZIDE 10 MG/1
TABLET, FILM COATED, EXTENDED RELEASE ORAL
Qty: 30 TABLET | Refills: 0 | Status: SHIPPED | OUTPATIENT
Start: 2020-07-13 | End: 2020-08-12

## 2020-07-13 RX ORDER — METFORMIN HCL 500 MG
TABLET, EXTENDED RELEASE 24 HR ORAL
Qty: 120 TABLET | Refills: 0 | Status: SHIPPED | OUTPATIENT
Start: 2020-07-13 | End: 2020-08-12

## 2020-07-13 RX ORDER — LOSARTAN POTASSIUM 100 MG/1
TABLET ORAL
Qty: 30 TABLET | Refills: 0 | Status: SHIPPED | OUTPATIENT
Start: 2020-07-13 | End: 2020-08-12

## 2020-07-13 NOTE — TELEPHONE ENCOUNTER
Prescription approved per [facility] Refill Protocol.    Last Written Prescription Date:  1/31/20  Last Fill Quantity: 90 day supplies,  # refills: 1     Last office visit: 1/31/2020 with prescribing provider:  David Deutsch PA-C with following plan:    Return in about 6 months (around 7/31/2020) for diabetes recheck, Physical Exam.     Future Office Visit:  None    E-prescribed a 1 month supply with appointment reminder.     Jessica Munoz RN BSN

## 2020-08-10 DIAGNOSIS — R80.9 TYPE 2 DIABETES MELLITUS WITH MICROALBUMINURIA, WITHOUT LONG-TERM CURRENT USE OF INSULIN (H): ICD-10-CM

## 2020-08-10 DIAGNOSIS — R03.0 ELEVATED BLOOD PRESSURE READING WITHOUT DIAGNOSIS OF HYPERTENSION: ICD-10-CM

## 2020-08-10 DIAGNOSIS — E11.29 TYPE 2 DIABETES MELLITUS WITH MICROALBUMINURIA, WITHOUT LONG-TERM CURRENT USE OF INSULIN (H): ICD-10-CM

## 2020-08-12 RX ORDER — GLIPIZIDE 10 MG/1
TABLET, FILM COATED, EXTENDED RELEASE ORAL
Qty: 30 TABLET | Refills: 0 | Status: SHIPPED | OUTPATIENT
Start: 2020-08-12 | End: 2020-08-24

## 2020-08-12 RX ORDER — METFORMIN HCL 500 MG
TABLET, EXTENDED RELEASE 24 HR ORAL
Qty: 120 TABLET | Refills: 0 | Status: SHIPPED | OUTPATIENT
Start: 2020-08-12 | End: 2021-01-19

## 2020-08-12 RX ORDER — LOSARTAN POTASSIUM 100 MG/1
TABLET ORAL
Qty: 30 TABLET | Refills: 0 | Status: SHIPPED | OUTPATIENT
Start: 2020-08-12 | End: 2020-08-24

## 2020-08-12 NOTE — TELEPHONE ENCOUNTER
patricia is due for a recheck. Have him make an appt to see me. In the next couple of weeks for a recheck in the next couple of weeks.

## 2020-08-12 NOTE — TELEPHONE ENCOUNTER
Routing refill request to provider for review/approval because:  Labs not current:  See below  Patient needs to be seen because:  Due for follow up last seen by PCP 1-31-20    BP Readings from Last 3 Encounters:   03/13/20 138/86   01/31/20 139/84   09/19/19 (!) 140/81       Hemoglobin A1C   Date Value Ref Range Status   01/31/2020 5.9 (H) 0 - 5.6 % Final     Comment:     Normal <5.7% Prediabetes 5.7-6.4%  Diabetes 6.5% or higher - adopted from ADA   consensus guidelines.       Last Comprehensive Metabolic Panel:  Sodium   Date Value Ref Range Status   07/18/2019 139 133 - 144 mmol/L Final     Potassium   Date Value Ref Range Status   07/18/2019 3.9 3.4 - 5.3 mmol/L Final     Chloride   Date Value Ref Range Status   07/18/2019 106 94 - 109 mmol/L Final     Carbon Dioxide   Date Value Ref Range Status   07/18/2019 26 20 - 32 mmol/L Final     Anion Gap   Date Value Ref Range Status   07/18/2019 7 3 - 14 mmol/L Final     Glucose   Date Value Ref Range Status   07/18/2019 105 (H) 70 - 99 mg/dL Final     Urea Nitrogen   Date Value Ref Range Status   07/18/2019 18 7 - 30 mg/dL Final     Creatinine   Date Value Ref Range Status   07/18/2019 1.14 0.66 - 1.25 mg/dL Final     GFR Estimate   Date Value Ref Range Status   07/18/2019 81 >60 mL/min/[1.73_m2] Final     Comment:     Non  GFR Calc  Starting 12/18/2018, serum creatinine based estimated GFR (eGFR) will be   calculated using the Chronic Kidney Disease Epidemiology Collaboration   (CKD-EPI) equation.       Calcium   Date Value Ref Range Status   07/18/2019 9.2 8.5 - 10.1 mg/dL Final

## 2020-08-24 ENCOUNTER — OFFICE VISIT (OUTPATIENT)
Dept: FAMILY MEDICINE | Facility: CLINIC | Age: 40
End: 2020-08-24

## 2020-08-24 VITALS
RESPIRATION RATE: 20 BRPM | BODY MASS INDEX: 47.81 KG/M2 | DIASTOLIC BLOOD PRESSURE: 89 MMHG | TEMPERATURE: 98.7 F | SYSTOLIC BLOOD PRESSURE: 139 MMHG | WEIGHT: 315 LBS | HEART RATE: 72 BPM | OXYGEN SATURATION: 96 %

## 2020-08-24 DIAGNOSIS — R03.0 ELEVATED BLOOD PRESSURE READING WITHOUT DIAGNOSIS OF HYPERTENSION: ICD-10-CM

## 2020-08-24 DIAGNOSIS — R80.9 TYPE 2 DIABETES MELLITUS WITH MICROALBUMINURIA, WITHOUT LONG-TERM CURRENT USE OF INSULIN (H): Primary | ICD-10-CM

## 2020-08-24 DIAGNOSIS — R06.83 SNORING: ICD-10-CM

## 2020-08-24 DIAGNOSIS — E78.5 HYPERLIPIDEMIA LDL GOAL <100: ICD-10-CM

## 2020-08-24 DIAGNOSIS — R06.81 WITNESSED APNEIC SPELLS: ICD-10-CM

## 2020-08-24 DIAGNOSIS — E11.29 TYPE 2 DIABETES MELLITUS WITH MICROALBUMINURIA, WITHOUT LONG-TERM CURRENT USE OF INSULIN (H): Primary | ICD-10-CM

## 2020-08-24 LAB
ANION GAP SERPL CALCULATED.3IONS-SCNC: 5 MMOL/L (ref 3–14)
BUN SERPL-MCNC: 23 MG/DL (ref 7–30)
CALCIUM SERPL-MCNC: 8.9 MG/DL (ref 8.5–10.1)
CHLORIDE SERPL-SCNC: 109 MMOL/L (ref 94–109)
CHOLEST SERPL-MCNC: 137 MG/DL
CO2 SERPL-SCNC: 27 MMOL/L (ref 20–32)
CREAT SERPL-MCNC: 1.02 MG/DL (ref 0.66–1.25)
CREAT UR-MCNC: 245 MG/DL
GFR SERPL CREATININE-BSD FRML MDRD: >90 ML/MIN/{1.73_M2}
GLUCOSE SERPL-MCNC: 134 MG/DL (ref 70–99)
HBA1C MFR BLD: 6.5 % (ref 0–5.6)
HDLC SERPL-MCNC: 38 MG/DL
LDLC SERPL CALC-MCNC: 74 MG/DL
MICROALBUMIN UR-MCNC: 57 MG/L
MICROALBUMIN/CREAT UR: 23.1 MG/G CR (ref 0–17)
NONHDLC SERPL-MCNC: 99 MG/DL
POTASSIUM SERPL-SCNC: 4.2 MMOL/L (ref 3.4–5.3)
SODIUM SERPL-SCNC: 141 MMOL/L (ref 133–144)
TRIGL SERPL-MCNC: 125 MG/DL

## 2020-08-24 PROCEDURE — 36415 COLL VENOUS BLD VENIPUNCTURE: CPT | Performed by: PHYSICIAN ASSISTANT

## 2020-08-24 PROCEDURE — 80061 LIPID PANEL: CPT | Performed by: PHYSICIAN ASSISTANT

## 2020-08-24 PROCEDURE — 83036 HEMOGLOBIN GLYCOSYLATED A1C: CPT | Performed by: PHYSICIAN ASSISTANT

## 2020-08-24 PROCEDURE — 80048 BASIC METABOLIC PNL TOTAL CA: CPT | Performed by: PHYSICIAN ASSISTANT

## 2020-08-24 PROCEDURE — 82043 UR ALBUMIN QUANTITATIVE: CPT | Performed by: PHYSICIAN ASSISTANT

## 2020-08-24 PROCEDURE — 99207 C FOOT EXAM  NO CHARGE: CPT | Performed by: PHYSICIAN ASSISTANT

## 2020-08-24 PROCEDURE — 99214 OFFICE O/P EST MOD 30 MIN: CPT | Performed by: PHYSICIAN ASSISTANT

## 2020-08-24 RX ORDER — GLIPIZIDE 10 MG/1
10 TABLET, FILM COATED, EXTENDED RELEASE ORAL DAILY
Qty: 90 TABLET | Refills: 1 | Status: SHIPPED | OUTPATIENT
Start: 2020-08-24 | End: 2021-01-19

## 2020-08-24 RX ORDER — LOSARTAN POTASSIUM 100 MG/1
100 TABLET ORAL DAILY
Qty: 90 TABLET | Refills: 1 | Status: SHIPPED | OUTPATIENT
Start: 2020-08-24 | End: 2021-01-19

## 2020-08-24 NOTE — PROGRESS NOTES
Subjective     Geoffrey Wilson is a 40 year old male who presents to clinic today for the following health issues:    HPI       Diabetes Follow-up      How often are you checking your blood sugar? Not at all    What concerns do you have today about your diabetes? None     Do you have any of these symptoms? (Select all that apply)  No numbness or tingling in feet.  No redness, sores or blisters on feet.  No complaints of excessive thirst.  No reports of blurry vision.  No significant changes to weight.    Have you had a diabetic eye exam in the last 12 months? Yes- Date of last eye exam: .          Hyperlipidemia Follow-Up      Are you regularly taking any medication or supplement to lower your cholesterol?   Yes- Rosuvastatin    Are you having muscle aches or other side effects that you think could be caused by your cholesterol lowering medication?  No    Hypertension Follow-up      Do you check your blood pressure regularly outside of the clinic? Yes     Are you following a low salt diet? No    Are your blood pressures ever more than 140 on the top number (systolic) OR more   than 90 on the bottom number (diastolic), for example 140/90? No    BP Readings from Last 2 Encounters:   03/13/20 138/86   01/31/20 139/84     Hemoglobin A1C (%)   Date Value   01/31/2020 5.9 (H)   07/18/2019 5.8 (H)     LDL Cholesterol Calculated (mg/dL)   Date Value   11/20/2018 78   10/19/2016 134 (H)         How many servings of fruits and vegetables do you eat daily?  2-3    On average, how many sweetened beverages do you drink each day (Examples: soda, juice, sweet tea, etc.  Do NOT count diet or artificially sweetened beverages)?   1    How many days per week do you exercise enough to make your heart beat faster? 6    How many minutes a day do you exercise enough to make your heart beat faster? 30 - 60    How many days per week do you miss taking your medication? 0        Review of Systems   Constitutional, HEENT, cardiovascular,  pulmonary, GI, , musculoskeletal, neuro, skin, endocrine and psych systems are negative, except as otherwise noted.      Objective    There were no vitals taken for this visit.  There is no height or weight on file to calculate BMI.  Physical Exam   Eye exam - right eye normal lid, conjunctiva, cornea, pupil and fundus, left eye normal lid, conjunctiva, cornea, pupil and fundus.  Thyroid not palpable, not enlarged, no nodules detected.  CHEST:chest clear to IPPA, no tachypnea, retractions or cyanosis and S1, S2 normal, no murmur, no gallop, rate regular.  Foot exam - both sides normal; no swelling, tenderness or skin or vascular lesions. Color and temperature is normal. Sensation is intact. Peripheral pulses are palpable. Toenails are normal.  Geoffrey was seen today for diabetes, hypertension and hyperlipidemia.    Diagnoses and all orders for this visit:    Type 2 diabetes mellitus with microalbuminuria, without long-term current use of insulin (H)  -     Albumin Random Urine Quantitative with Creat Ratio  -     HEMOGLOBIN A1C  -     OPTOMETRY REFERRAL  -     JUST IN CASE  -     FOOT EXAM  -     glipiZIDE (GLUCOTROL XL) 10 MG 24 hr tablet; Take 1 tablet (10 mg) by mouth daily  -     metFORMIN (GLUCOPHAGE) 1000 MG tablet; Take 1 tablet (1,000 mg) by mouth 2 times daily (with meals)    Hyperlipidemia LDL goal <100  -     Lipid panel reflex to direct LDL Fasting  -     JUST IN CASE    Elevated blood pressure reading without diagnosis of hypertension  -     BASIC METABOLIC PANEL  -     JUST IN CASE  -     losartan (COZAAR) 100 MG tablet; Take 1 tablet (100 mg) by mouth daily    Witnessed apneic spells  -     SLEEP EVALUATION & MANAGEMENT REFERRAL - [facility] [place]  [phone] (Age 15 and up); Future    Snoring  -     SLEEP EVALUATION & MANAGEMENT REFERRAL - [facility] [place]  [phone] (Age 15 and up); Future      work on lifestyle modification  Recheck in  6 mos

## 2020-08-27 ENCOUNTER — TELEPHONE (OUTPATIENT)
Dept: SLEEP MEDICINE | Facility: CLINIC | Age: 40
End: 2020-08-27

## 2020-10-04 DIAGNOSIS — K21.9 GASTROESOPHAGEAL REFLUX DISEASE WITHOUT ESOPHAGITIS: ICD-10-CM

## 2020-10-05 RX ORDER — OMEPRAZOLE 40 MG/1
CAPSULE, DELAYED RELEASE ORAL
Qty: 90 CAPSULE | Refills: 1 | Status: SHIPPED | OUTPATIENT
Start: 2020-10-05 | End: 2021-09-07

## 2020-10-05 NOTE — TELEPHONE ENCOUNTER
"  Prescription approved per [facility] [place]ill [facility].      Requested Prescriptions   Pending Prescriptions Disp Refills     omeprazole (PRILOSEC) 40 MG DR capsule [Pharmacy Med Name: OMEPRAZOLE 40MG CAPSULES] 90 capsule 1     Sig: TAKE 1 CAPSULE(40 MG) BY MOUTH DAILY       PPI Protocol Passed - 10/5/2020  7:09 AM        Passed - Not on Clopidogrel (unless Pantoprazole ordered)        Passed - No diagnosis of osteoporosis on record        Passed - Recent (12 mo) or future (30 days) visit within the authorizing provider's specialty     Patient has had an office visit with the authorizing provider or a provider within the authorizing providers department within the previous 12 mos or has a future within next 30 days. See \"Patient Info\" tab in inbasket, or \"Choose Columns\" in Meds & Orders section of the refill encounter.              Passed - Medication is active on med list        Passed - Patient is age 18 or older             "

## 2021-01-09 ENCOUNTER — HEALTH MAINTENANCE LETTER (OUTPATIENT)
Age: 41
End: 2021-01-09

## 2021-01-19 ENCOUNTER — OFFICE VISIT (OUTPATIENT)
Dept: FAMILY MEDICINE | Facility: CLINIC | Age: 41
End: 2021-01-19
Payer: COMMERCIAL

## 2021-01-19 VITALS
DIASTOLIC BLOOD PRESSURE: 88 MMHG | OXYGEN SATURATION: 95 % | TEMPERATURE: 98.3 F | RESPIRATION RATE: 20 BRPM | SYSTOLIC BLOOD PRESSURE: 128 MMHG | BODY MASS INDEX: 48 KG/M2 | WEIGHT: 315 LBS | HEART RATE: 81 BPM

## 2021-01-19 DIAGNOSIS — R80.9 TYPE 2 DIABETES MELLITUS WITH MICROALBUMINURIA, WITHOUT LONG-TERM CURRENT USE OF INSULIN (H): ICD-10-CM

## 2021-01-19 DIAGNOSIS — R03.0 ELEVATED BLOOD PRESSURE READING WITHOUT DIAGNOSIS OF HYPERTENSION: ICD-10-CM

## 2021-01-19 DIAGNOSIS — Z00.00 ROUTINE GENERAL MEDICAL EXAMINATION AT A HEALTH CARE FACILITY: Primary | ICD-10-CM

## 2021-01-19 DIAGNOSIS — E78.5 HYPERLIPIDEMIA LDL GOAL <100: ICD-10-CM

## 2021-01-19 DIAGNOSIS — E11.29 TYPE 2 DIABETES MELLITUS WITH MICROALBUMINURIA, WITHOUT LONG-TERM CURRENT USE OF INSULIN (H): ICD-10-CM

## 2021-01-19 LAB — HBA1C MFR BLD: 6.4 % (ref 0–5.6)

## 2021-01-19 PROCEDURE — 36415 COLL VENOUS BLD VENIPUNCTURE: CPT | Performed by: PHYSICIAN ASSISTANT

## 2021-01-19 PROCEDURE — 99214 OFFICE O/P EST MOD 30 MIN: CPT | Performed by: PHYSICIAN ASSISTANT

## 2021-01-19 PROCEDURE — 83036 HEMOGLOBIN GLYCOSYLATED A1C: CPT | Performed by: PHYSICIAN ASSISTANT

## 2021-01-19 RX ORDER — GLIPIZIDE 10 MG/1
10 TABLET, FILM COATED, EXTENDED RELEASE ORAL DAILY
Qty: 90 TABLET | Refills: 1 | Status: SHIPPED | OUTPATIENT
Start: 2021-01-19 | End: 2021-09-07

## 2021-01-19 RX ORDER — ROSUVASTATIN CALCIUM 20 MG/1
20 TABLET, COATED ORAL DAILY
Qty: 90 TABLET | Refills: 3 | Status: SHIPPED | OUTPATIENT
Start: 2021-01-19 | End: 2022-01-11

## 2021-01-19 RX ORDER — LOSARTAN POTASSIUM 100 MG/1
100 TABLET ORAL DAILY
Qty: 90 TABLET | Refills: 1 | Status: SHIPPED | OUTPATIENT
Start: 2021-01-19 | End: 2021-09-07

## 2021-01-19 NOTE — PROGRESS NOTES
Ady Hale is a 40 year old who presents to clinic today for the following health issues HPI       Diabetes Follow-up      How often are you checking your blood sugar? Not at all    What concerns do you have today about your diabetes? None     Do you have any of these symptoms? (Select all that apply)  No numbness or tingling in feet.  No redness, sores or blisters on feet.  No complaints of excessive thirst.  No reports of blurry vision.  No significant changes to weight.    Have you had a diabetic eye exam in the last 12 months? No        Not checking sugars.  No lows.  No chest pain/sob/palps/vision changes/neuropathy symptoms.     Hyperlipidemia Follow-Up      Are you regularly taking any medication or supplement to lower your cholesterol?   Yes- Rosuvastatin    Are you having muscle aches or other side effects that you think could be caused by your cholesterol lowering medication?  No    Hypertension Follow-up      Do you check your blood pressure regularly outside of the clinic? Yes     Are you following a low salt diet? Yes    Are your blood pressures ever more than 140 on the top number (systolic) OR more   than 90 on the bottom number (diastolic), for example 140/90? No    BP Readings from Last 2 Encounters:   01/19/21 128/88   08/24/20 139/89     Hemoglobin A1C (%)   Date Value   08/24/2020 6.5 (H)   01/31/2020 5.9 (H)     LDL Cholesterol Calculated (mg/dL)   Date Value   08/24/2020 74   11/20/2018 78         How many servings of fruits and vegetables do you eat daily?  2-3    On average, how many sweetened beverages do you drink each day (Examples: soda, juice, sweet tea, etc.  Do NOT count diet or artificially sweetened beverages)?   1    How many days per week do you exercise enough to make your heart beat faster? 3 or less    How many minutes a day do you exercise enough to make your heart beat faster? 9 or less    How many days per week do you miss taking your medication?  0        Review of Systems   Constitutional, HEENT, cardiovascular, pulmonary, GI, , musculoskeletal, neuro, skin, endocrine and psych systems are negative, except as otherwise noted.      Objective    /88   Pulse 81   Temp 98.3  F (36.8  C) (Tympanic)   Resp 20   Wt (!) 165 kg (363 lb 12.8 oz)   SpO2 95%   BMI 48.00 kg/m    Body mass index is 48 kg/m .  Physical Exam   Eye exam - right eye normal lid, conjunctiva, cornea, pupil and fundus, left eye normal lid, conjunctiva, cornea, pupil and fundus.  Thyroid not palpable, not enlarged, no nodules detected.  CHEST:chest clear to IPPA, no tachypnea, retractions or cyanosis and S1, S2 normal, no murmur, no gallop, rate regular.  Foot exam - both sides normal; no swelling, tenderness or skin or vascular lesions. Color and temperature is normal. Sensation is intact. Peripheral pulses are palpable. Toenails are normal.    Diagnoses and all orders for this visit:    Routine general medical examination at a health care facility    Type 2 diabetes mellitus with microalbuminuria, without long-term current use of insulin (H)  -     Hemoglobin A1c  -     JUST IN CASE  -     metFORMIN (GLUCOPHAGE) 1000 MG tablet; Take 1 tablet (1,000 mg) by mouth 2 times daily (with meals)  -     glipiZIDE (GLUCOTROL XL) 10 MG 24 hr tablet; Take 1 tablet (10 mg) by mouth daily    Elevated blood pressure reading without diagnosis of hypertension  -     JUST IN CASE  -     losartan (COZAAR) 100 MG tablet; Take 1 tablet (100 mg) by mouth daily    Hyperlipidemia LDL goal <100  -     rosuvastatin (CRESTOR) 20 MG tablet; Take 1 tablet (20 mg) by mouth daily      work on lifestyle modification  Recheck in 6 mos

## 2021-01-19 NOTE — PROGRESS NOTES
"  3  SUBJECTIVE:   CC: Geoffrey Wilson is an 40 year old male who presents for preventive health visit.     {Split Bill scripting  The purpose of this visit is to discuss your medical history and prevent health problems before you are sick. You may be responsible for a co-pay, coinsurance, or deductible if your visit today includes services such as checking on a sore throat, having an x-ray or lab test, or treating and evaluating a new or existing condition :132609}  Patient has been advised of split billing requirements and indicates understanding: {Yes and No:318806}  Healthy Habits:    Do you get at least three servings of calcium containing foods daily (dairy, green leafy vegetables, etc.)? { :517943::\"yes\"}    Amount of exercise or daily activities, outside of work: { :008586}    Problems taking medications regularly { :716321::\"No\"}    Medication side effects: { :147649::\"No\"}    Have you had an eye exam in the past two years? { :122547}    Do you see a dentist twice per year? { :910018}    Do you have sleep apnea, excessive snoring or daytime drowsiness?{ :832917}  {Outside tests to abstract? :962908}    {additional problems to add (Optional):089708}    Today's PHQ-2 Score:   PHQ-2 ( 1999 Pfizer) 1/31/2020 1/2/2019   Q1: Little interest or pleasure in doing things 0 0   Q2: Feeling down, depressed or hopeless 0 0   PHQ-2 Score 0 0     {PHQ-2 LOOK IN ASSESSMENTS (Optional) :122552}  Abuse: Current or Past(Physical, Sexual or Emotional)- {YES/NO/NA:651414}  Do you feel safe in your environment? {YES/NO/NA:160251}        Social History     Tobacco Use     Smoking status: Never Smoker     Smokeless tobacco: Never Used   Substance Use Topics     Alcohol use: Yes     Alcohol/week: 0.0 standard drinks     Comment: 2 times/ month     If you drink alcohol do you typically have >3 drinks per day or >7 drinks per week? {ETOH :541112}                      Last PSA: No results found for: PSA    Reviewed orders with " "patient. Reviewed health maintenance and updated orders accordingly - {Yes/No:373891::\"Yes\"}  {Chronicprobdata (Optional):204199}    Reviewed and updated as needed this visit by clinical staff                 Reviewed and updated as needed this visit by Provider                {HISTORY OPTIONS (Optional):783738}    ROS:  { :022086::\"CONSTITUTIONAL: NEGATIVE for fever, chills, change in weight\",\"INTEGUMENTARY/SKIN: NEGATIVE for worrisome rashes, moles or lesions\",\"EYES: NEGATIVE for vision changes or irritation\",\"ENT: NEGATIVE for ear, mouth and throat problems\",\"RESP: NEGATIVE for significant cough or SOB\",\"CV: NEGATIVE for chest pain, palpitations or peripheral edema\",\"GI: NEGATIVE for nausea, abdominal pain, heartburn, or change in bowel habits\",\" male: negative for dysuria, hematuria, decreased urinary stream, erectile dysfunction, urethral discharge\",\"MUSCULOSKELETAL: NEGATIVE for significant arthralgias or myalgia\",\"NEURO: NEGATIVE for weakness, dizziness or paresthesias\",\"PSYCHIATRIC: NEGATIVE for changes in mood or affect\"}    OBJECTIVE:   There were no vitals taken for this visit.  EXAM:  {Exam Choices:823016}    {Diagnostic Test Results (Optional):357376::\"Diagnostic Test Results:\",\"Labs reviewed in Epic\"}    ASSESSMENT/PLAN:   {Diag Picklist:868691}    Patient has been advised of split billing requirements and indicates understanding: {YES / NO:521622::\"Yes\"}  COUNSELING:  {MALE COUNSELING MESSAGES:240145::\"Reviewed preventive health counseling, as reflected in patient instructions\"}    Estimated body mass index is 47.81 kg/m  as calculated from the following:    Height as of 3/13/20: 1.854 m (6' 1\").    Weight as of 8/24/20: 164.4 kg (362 lb 6.4 oz).    {Weight Management Plan (ACO) Complete if BMI is abnormal-  Ages 18-64  BMI >24.9.  Age 65+ with BMI <23 or >30 (Optional):333699}    He reports that he has never smoked. He has never used smokeless tobacco.      Counseling Resources:  ATP IV " Guidelines  Pooled Cohorts Equation Calculator  FRAX Risk Assessment  ICSI Preventive Guidelines  Dietary Guidelines for Americans, 2010  USDA's MyPlate  ASA Prophylaxis  Lung CA Screening    [facility]

## 2021-08-01 ENCOUNTER — TRANSFERRED RECORDS (OUTPATIENT)
Dept: HEALTH INFORMATION MANAGEMENT | Facility: CLINIC | Age: 41
End: 2021-08-01

## 2021-08-01 LAB — RETINOPATHY: NORMAL

## 2021-08-20 ENCOUNTER — TRANSFERRED RECORDS (OUTPATIENT)
Dept: HEALTH INFORMATION MANAGEMENT | Facility: CLINIC | Age: 41
End: 2021-08-20

## 2021-08-20 LAB — RETINOPATHY: NEGATIVE

## 2021-08-28 ENCOUNTER — HEALTH MAINTENANCE LETTER (OUTPATIENT)
Age: 41
End: 2021-08-28

## 2021-09-01 DIAGNOSIS — E11.29 TYPE 2 DIABETES MELLITUS WITH MICROALBUMINURIA, WITHOUT LONG-TERM CURRENT USE OF INSULIN (H): ICD-10-CM

## 2021-09-01 DIAGNOSIS — R80.9 TYPE 2 DIABETES MELLITUS WITH MICROALBUMINURIA, WITHOUT LONG-TERM CURRENT USE OF INSULIN (H): ICD-10-CM

## 2021-09-05 NOTE — TELEPHONE ENCOUNTER
Routing refill request to provider for review/approval because:  Labs not current:  A1c, cr  Pt has appt on 9/7/21      Medication pended for approval, 30 day supply with reminder.

## 2021-09-07 ENCOUNTER — OFFICE VISIT (OUTPATIENT)
Dept: FAMILY MEDICINE | Facility: CLINIC | Age: 41
End: 2021-09-07
Payer: COMMERCIAL

## 2021-09-07 VITALS
TEMPERATURE: 98 F | OXYGEN SATURATION: 94 % | SYSTOLIC BLOOD PRESSURE: 137 MMHG | HEART RATE: 89 BPM | BODY MASS INDEX: 51.61 KG/M2 | DIASTOLIC BLOOD PRESSURE: 96 MMHG | WEIGHT: 315 LBS | RESPIRATION RATE: 20 BRPM

## 2021-09-07 DIAGNOSIS — E78.5 HYPERLIPIDEMIA LDL GOAL <100: ICD-10-CM

## 2021-09-07 DIAGNOSIS — E11.29 TYPE 2 DIABETES MELLITUS WITH MICROALBUMINURIA, WITHOUT LONG-TERM CURRENT USE OF INSULIN (H): Primary | ICD-10-CM

## 2021-09-07 DIAGNOSIS — R80.9 TYPE 2 DIABETES MELLITUS WITH MICROALBUMINURIA, WITHOUT LONG-TERM CURRENT USE OF INSULIN (H): Primary | ICD-10-CM

## 2021-09-07 DIAGNOSIS — R03.0 ELEVATED BLOOD PRESSURE READING WITHOUT DIAGNOSIS OF HYPERTENSION: ICD-10-CM

## 2021-09-07 DIAGNOSIS — Z11.59 NEED FOR HEPATITIS C SCREENING TEST: ICD-10-CM

## 2021-09-07 DIAGNOSIS — Z13.220 SCREENING FOR HYPERLIPIDEMIA: ICD-10-CM

## 2021-09-07 LAB
ANION GAP SERPL CALCULATED.3IONS-SCNC: 5 MMOL/L (ref 3–14)
BUN SERPL-MCNC: 15 MG/DL (ref 7–30)
CALCIUM SERPL-MCNC: 9.1 MG/DL (ref 8.5–10.1)
CHLORIDE BLD-SCNC: 109 MMOL/L (ref 94–109)
CHOLEST SERPL-MCNC: 129 MG/DL
CO2 SERPL-SCNC: 24 MMOL/L (ref 20–32)
CREAT SERPL-MCNC: 0.89 MG/DL (ref 0.66–1.25)
CREAT UR-MCNC: 232 MG/DL
FASTING STATUS PATIENT QL REPORTED: YES
GFR SERPL CREATININE-BSD FRML MDRD: >90 ML/MIN/1.73M2
GLUCOSE BLD-MCNC: 186 MG/DL (ref 70–99)
HBA1C MFR BLD: 7.1 % (ref 0–5.6)
HCV AB SERPL QL IA: NONREACTIVE
HDLC SERPL-MCNC: 41 MG/DL
LDLC SERPL CALC-MCNC: 61 MG/DL
MICROALBUMIN UR-MCNC: 101 MG/L
MICROALBUMIN/CREAT UR: 43.53 MG/G CR (ref 0–17)
NONHDLC SERPL-MCNC: 88 MG/DL
POTASSIUM BLD-SCNC: 4.4 MMOL/L (ref 3.4–5.3)
SODIUM SERPL-SCNC: 138 MMOL/L (ref 133–144)
TRIGL SERPL-MCNC: 134 MG/DL

## 2021-09-07 PROCEDURE — 82043 UR ALBUMIN QUANTITATIVE: CPT | Performed by: PHYSICIAN ASSISTANT

## 2021-09-07 PROCEDURE — 99207 PR FOOT EXAM NO CHARGE: CPT | Mod: 25 | Performed by: PHYSICIAN ASSISTANT

## 2021-09-07 PROCEDURE — 83036 HEMOGLOBIN GLYCOSYLATED A1C: CPT | Performed by: PHYSICIAN ASSISTANT

## 2021-09-07 PROCEDURE — 80061 LIPID PANEL: CPT | Performed by: PHYSICIAN ASSISTANT

## 2021-09-07 PROCEDURE — 36415 COLL VENOUS BLD VENIPUNCTURE: CPT | Performed by: PHYSICIAN ASSISTANT

## 2021-09-07 PROCEDURE — 86803 HEPATITIS C AB TEST: CPT | Performed by: PHYSICIAN ASSISTANT

## 2021-09-07 PROCEDURE — 80048 BASIC METABOLIC PNL TOTAL CA: CPT | Performed by: PHYSICIAN ASSISTANT

## 2021-09-07 PROCEDURE — 99214 OFFICE O/P EST MOD 30 MIN: CPT | Performed by: PHYSICIAN ASSISTANT

## 2021-09-07 RX ORDER — GLIPIZIDE 10 MG/1
10 TABLET, FILM COATED, EXTENDED RELEASE ORAL DAILY
Qty: 90 TABLET | Refills: 1 | Status: SHIPPED | OUTPATIENT
Start: 2021-09-07 | End: 2022-01-11

## 2021-09-07 RX ORDER — AMLODIPINE AND VALSARTAN 5; 160 MG/1; MG/1
1 TABLET ORAL DAILY
Qty: 30 TABLET | Refills: 1 | Status: SHIPPED | OUTPATIENT
Start: 2021-09-07 | End: 2021-11-08

## 2021-09-07 NOTE — PROGRESS NOTES
Ady Hale is a 41 year old who presents for the following health issues   HPI     Diabetes Follow-up      How often are you checking your blood sugar? Not at all    What concerns do you have today about your diabetes? None     Do you have any of these symptoms? (Select all that apply)  Weight gain    Have you had a diabetic eye exam in the last 12 months? Yes- Date of last eye exam: 08/2021,  Location: [facility]  Not checking numbers.  No chest pain/sob/palpitations/dizziness/ha's  No neuropathy symptoms or vision changes.   Recheck of his htn.  Hasn't been as active. Sits all day. Planning to start working out again.  Working on diet. Watching starches/carbs/sugars.   No polyuria/dipsia.      BP Readings from Last 2 Encounters:   09/07/21 (!) 137/96   01/19/21 128/88     Hemoglobin A1C (%)   Date Value   09/07/2021 7.1 (H)   01/19/2021 6.4 (H)   08/24/2020 6.5 (H)     LDL Cholesterol Calculated (mg/dL)   Date Value   08/24/2020 74   11/20/2018 78         Hypertension Follow-up      Do you check your blood pressure regularly outside of the clinic? Yes     Are you following a low salt diet? No    Are your blood pressures ever more than 140 on the top number (systolic) OR more   than 90 on the bottom number (diastolic), for example 140/90? No      How many servings of fruits and vegetables do you eat daily?  2-3    On average, how many sweetened beverages do you drink each day (Examples: soda, juice, sweet tea, etc.  Do NOT count diet or artificially sweetened beverages)?   0    How many days per week do you exercise enough to make your heart beat faster? 5    How many minutes a day do you exercise enough to make your heart beat faster? 30 - 60    How many days per week do you miss taking your medication? 0        Review of Systems   Constitutional, HEENT, cardiovascular, pulmonary, GI, , musculoskeletal, neuro, skin, endocrine and psych systems are negative, except as otherwise  noted.      Objective    BP (!) 137/96   Pulse 89   Temp 98  F (36.7  C) (Tympanic)   Resp 20   Wt (!) 177.4 kg (391 lb 3.2 oz)   SpO2 94%   BMI 51.61 kg/m    Body mass index is 51.61 kg/m .  Physical Exam   Eye exam - right eye normal lid, conjunctiva, cornea, pupil and fundus, left eye normal lid, conjunctiva, cornea, pupil and fundus.  Thyroid not palpable, not enlarged, no nodules detected.  CHEST:chest clear to IPPA, no tachypnea, retractions or cyanosis and S1, S2 normal, no murmur, no gallop, rate regular.  Foot exam - both sides normal; no swelling, tenderness or skin or vascular lesions. Color and temperature is normal. Sensation is intact. Peripheral pulses are palpable. Toenails are normal.  Geoffrey was seen today for diabetes and hypertension.    Diagnoses and all orders for this visit:    Type 2 diabetes mellitus with microalbuminuria, without long-term current use of insulin (H)  -     HEMOGLOBIN A1C; Future  -     Albumin Random Urine Quantitative with Creat Ratio; Future  -     OPTOMETRY REFERRAL; Future  -     FOOT EXAM  -     Albumin Random Urine Quantitative with Creat Ratio  -     HEMOGLOBIN A1C  -     glipiZIDE (GLUCOTROL XL) 10 MG 24 hr tablet; Take 1 tablet (10 mg) by mouth daily  -     metFORMIN (GLUCOPHAGE) 1000 MG tablet; TAKE 1 TABLET BY MOUTH TWICE DAILY WITH MEALS    Need for hepatitis C screening test  -     Hepatitis C Screen Reflex to HCV RNA Quant and Genotype; Future  -     Hepatitis C Screen Reflex to HCV RNA Quant and Genotype    Screening for hyperlipidemia    Elevated blood pressure reading without diagnosis of hypertension  -     BASIC METABOLIC PANEL; Future  -     BASIC METABOLIC PANEL  -     amLODIPine-valsartan (EXFORGE) 5-160 MG tablet; Take 1 tablet by mouth daily    Hyperlipidemia LDL goal <100  -     Lipid panel reflex to direct LDL Fasting; Future  -     Lipid panel reflex to direct LDL Fasting    Other orders  -     REVIEW OF HEALTH MAINTENANCE PROTOCOL ORDERS  -      Cancel: Extra Tube; Future      Stop losartan. Start exforge.    work on lifestyle modification to help lower sugars and his a1c.    Recheck in 2 mos

## 2021-09-08 DIAGNOSIS — N52.9 ERECTILE DYSFUNCTION, UNSPECIFIED ERECTILE DYSFUNCTION TYPE: ICD-10-CM

## 2021-09-08 RX ORDER — SILDENAFIL CITRATE 20 MG/1
TABLET ORAL
Qty: 30 TABLET | Refills: 11 | Status: ON HOLD | OUTPATIENT
Start: 2021-09-08 | End: 2023-11-14

## 2021-09-08 NOTE — TELEPHONE ENCOUNTER
"Requested Prescriptions   Pending Prescriptions Disp Refills     sildenafil (REVATIO) 20 MG tablet 30 tablet 11     Si-5 tabs daily prn       Erectile Dysfuction Protocol Passed - 2021  3:50 PM        Passed - Absence of nitrates on medication list        Passed - Absence of Alpha Blockers on Med list        Passed - Recent (12 mo) or future (30 days) visit within the authorizing provider's specialty     Patient has had an office visit with the authorizing provider or a provider within the authorizing providers department within the previous 12 mos or has a future within next 30 days. See \"Patient Info\" tab in inbasket, or \"Choose Columns\" in Meds & Orders section of the refill encounter.              Passed - Medication is active on med list        Passed - Patient is age 18 or older           Prescription approved per [facility] Refill Protocol.  Catalina Fenton RN      "

## 2021-10-23 ENCOUNTER — HEALTH MAINTENANCE LETTER (OUTPATIENT)
Age: 41
End: 2021-10-23

## 2021-12-07 DIAGNOSIS — R03.0 ELEVATED BLOOD PRESSURE READING WITHOUT DIAGNOSIS OF HYPERTENSION: ICD-10-CM

## 2021-12-09 NOTE — TELEPHONE ENCOUNTER
Routing refill request to provider for review/approval because:  BP failing    BP Readings from Last 3 Encounters:   09/07/21 (!) 137/96   01/19/21 128/88   08/24/20 139/89

## 2021-12-10 RX ORDER — AMLODIPINE AND VALSARTAN 5; 160 MG/1; MG/1
TABLET ORAL
Qty: 30 TABLET | Refills: 0 | Status: SHIPPED | OUTPATIENT
Start: 2021-12-10 | End: 2022-01-11

## 2021-12-29 ENCOUNTER — TELEPHONE (OUTPATIENT)
Dept: SLEEP MEDICINE | Facility: CLINIC | Age: 41
End: 2021-12-29
Payer: COMMERCIAL

## 2021-12-29 NOTE — TELEPHONE ENCOUNTER
Reason for Call:  Other appointment    Detailed comments: patient would like to re scchedule [facility].  Please contact patient.  Thank you.    Phone Number Patient can be reached at: Home number on file [phone] (home)    Best Time: any    Can we leave a detailed message on this number? YES    Call taken on 12/29/2021 at 1:40 PM by Elinor Mendez

## 2022-01-11 ENCOUNTER — OFFICE VISIT (OUTPATIENT)
Dept: FAMILY MEDICINE | Facility: CLINIC | Age: 42
End: 2022-01-11
Payer: COMMERCIAL

## 2022-01-11 VITALS
HEIGHT: 73 IN | RESPIRATION RATE: 20 BRPM | SYSTOLIC BLOOD PRESSURE: 129 MMHG | OXYGEN SATURATION: 92 % | BODY MASS INDEX: 41.75 KG/M2 | TEMPERATURE: 97.9 F | HEART RATE: 95 BPM | WEIGHT: 315 LBS | DIASTOLIC BLOOD PRESSURE: 82 MMHG

## 2022-01-11 DIAGNOSIS — E11.29 TYPE 2 DIABETES MELLITUS WITH MICROALBUMINURIA, WITHOUT LONG-TERM CURRENT USE OF INSULIN (H): ICD-10-CM

## 2022-01-11 DIAGNOSIS — R13.14 PHARYNGOESOPHAGEAL DYSPHAGIA: ICD-10-CM

## 2022-01-11 DIAGNOSIS — E78.5 HYPERLIPIDEMIA LDL GOAL <100: ICD-10-CM

## 2022-01-11 DIAGNOSIS — R03.0 ELEVATED BLOOD PRESSURE READING WITHOUT DIAGNOSIS OF HYPERTENSION: ICD-10-CM

## 2022-01-11 DIAGNOSIS — E66.01 CLASS 3 SEVERE OBESITY DUE TO EXCESS CALORIES WITH SERIOUS COMORBIDITY AND BODY MASS INDEX (BMI) OF 45.0 TO 49.9 IN ADULT (H): ICD-10-CM

## 2022-01-11 DIAGNOSIS — R80.9 TYPE 2 DIABETES MELLITUS WITH MICROALBUMINURIA, WITHOUT LONG-TERM CURRENT USE OF INSULIN (H): ICD-10-CM

## 2022-01-11 DIAGNOSIS — Z00.00 ROUTINE GENERAL MEDICAL EXAMINATION AT A HEALTH CARE FACILITY: Primary | ICD-10-CM

## 2022-01-11 DIAGNOSIS — K21.9 GASTROESOPHAGEAL REFLUX DISEASE WITHOUT ESOPHAGITIS: ICD-10-CM

## 2022-01-11 DIAGNOSIS — E66.813 CLASS 3 SEVERE OBESITY DUE TO EXCESS CALORIES WITH SERIOUS COMORBIDITY AND BODY MASS INDEX (BMI) OF 45.0 TO 49.9 IN ADULT (H): ICD-10-CM

## 2022-01-11 DIAGNOSIS — N18.2 CHRONIC KIDNEY DISEASE, STAGE 2 (MILD): ICD-10-CM

## 2022-01-11 LAB
CHOLEST SERPL-MCNC: 219 MG/DL
FASTING STATUS PATIENT QL REPORTED: YES
HBA1C MFR BLD: 9.2 % (ref 0–5.6)
HDLC SERPL-MCNC: 40 MG/DL
HOLD SPECIMEN: NORMAL
HOLD SPECIMEN: NORMAL
LDLC SERPL CALC-MCNC: 118 MG/DL
NONHDLC SERPL-MCNC: 179 MG/DL
TRIGL SERPL-MCNC: 305 MG/DL

## 2022-01-11 PROCEDURE — 80061 LIPID PANEL: CPT | Performed by: PHYSICIAN ASSISTANT

## 2022-01-11 PROCEDURE — 36415 COLL VENOUS BLD VENIPUNCTURE: CPT | Performed by: PHYSICIAN ASSISTANT

## 2022-01-11 PROCEDURE — 99214 OFFICE O/P EST MOD 30 MIN: CPT | Mod: 25 | Performed by: PHYSICIAN ASSISTANT

## 2022-01-11 PROCEDURE — 83036 HEMOGLOBIN GLYCOSYLATED A1C: CPT | Performed by: PHYSICIAN ASSISTANT

## 2022-01-11 PROCEDURE — 99396 PREV VISIT EST AGE 40-64: CPT | Performed by: PHYSICIAN ASSISTANT

## 2022-01-11 RX ORDER — ROSUVASTATIN CALCIUM 20 MG/1
20 TABLET, COATED ORAL DAILY
Qty: 90 TABLET | Refills: 3 | Status: SHIPPED | OUTPATIENT
Start: 2022-01-11 | End: 2022-08-29

## 2022-01-11 RX ORDER — AMLODIPINE AND VALSARTAN 5; 160 MG/1; MG/1
TABLET ORAL
Qty: 90 TABLET | Refills: 1 | Status: SHIPPED | OUTPATIENT
Start: 2022-01-11 | End: 2022-07-09

## 2022-01-11 RX ORDER — GLIPIZIDE 10 MG/1
20 TABLET, FILM COATED, EXTENDED RELEASE ORAL DAILY
Qty: 180 TABLET | Refills: 0 | Status: SHIPPED | OUTPATIENT
Start: 2022-01-11 | End: 2022-04-11

## 2022-01-11 ASSESSMENT — ENCOUNTER SYMPTOMS
HYPERTENSION: 1
PALPITATIONS: 0
NERVOUS/ANXIOUS: 0
FEVER: 0
FREQUENCY: 0
HEADACHES: 0
COUGH: 0
ABDOMINAL PAIN: 0
SORE THROAT: 0
HEMATOCHEZIA: 0
SHORTNESS OF BREATH: 0
DYSURIA: 0
DIZZINESS: 0
PARESTHESIAS: 0
ARTHRALGIAS: 0
NAUSEA: 0
EYE PAIN: 0
HEARTBURN: 0
WEAKNESS: 0
DIARRHEA: 0
JOINT SWELLING: 0
CHILLS: 0
HEMATURIA: 0
CONSTIPATION: 0
MYALGIAS: 0

## 2022-01-11 ASSESSMENT — MIFFLIN-ST. JEOR: SCORE: 2759.99

## 2022-01-11 ASSESSMENT — PAIN SCALES - GENERAL: PAINLEVEL: NO PAIN (0)

## 2022-01-11 NOTE — PROGRESS NOTES
SUBJECTIVE:   CC: Geoffrey Wilson is an 41 year old male who presents for preventative health visit.       Patient has been advised of split billing requirements and indicates understanding: Yes  Hypertension   Pertinent negatives include no chest pain, no palpitations, no headaches, no peripheral edema, no dizziness and no shortness of breath.   Healthy Habits:     Getting at least 3 servings of Calcium per day:  Yes    Bi-annual eye exam:  Yes    Dental care twice a year:  NO    Sleep apnea or symptoms of sleep apnea:  Daytime drowsiness, Excessive snoring and Sleep apnea    Diet:  Low salt and Diabetic    Frequency of exercise:  2-3 days/week    Duration of exercise:  15-30 minutes    Taking medications regularly:  Yes    Medication side effects:  None    PHQ-2 Total Score: 0    Additional concerns today:  No    Recheck of dm and obesity. a1c up. Discussed lifestyle changes.   No chest pain/sob/palpitations/dizziness/ha's  Dysphagia . Episodic. No sore throat or hoarseness. History of gerd.       Today's PHQ-2 Score:   PHQ-2 ( 1999 Pfizer) 1/11/2022   Q1: Little interest or pleasure in doing things 0   Q2: Feeling down, depressed or hopeless 0   PHQ-2 Score 0   PHQ-2 Total Score (12-17 Years)- Positive if 3 or more points; Administer PHQ-A if positive -   Q1: Little interest or pleasure in doing things Not at all   Q2: Feeling down, depressed or hopeless Not at all   PHQ-2 Score 0       Abuse: Current or Past(Physical, Sexual or Emotional)- No  Do you feel safe in your environment? Yes    Have you ever done Advance Care Planning? (For example, a Health Directive, POLST, or a discussion with a medical provider or your loved ones about your wishes): No, advance care planning information given to patient to review.  Patient declined advance care planning discussion at this time.    Social History     Tobacco Use     Smoking status: Never Smoker     Smokeless tobacco: Never Used   Substance Use Topics     Alcohol use:  Yes     Alcohol/week: 0.0 standard drinks     Comment: 2 times/ month         Alcohol Use 1/11/2022   Prescreen: >3 drinks/day or >7 drinks/week? No       Last PSA: No results found for: PSA    Reviewed orders with patient. Reviewed health maintenance and updated orders accordingly - Yes  Lab work is in process  Labs reviewed in EPIC  BP Readings from Last 3 Encounters:   01/11/22 129/82   09/07/21 (!) 137/96   01/19/21 128/88    Wt Readings from Last 3 Encounters:   01/11/22 (!) 179.7 kg (396 lb 3.2 oz)   09/07/21 (!) 177.4 kg (391 lb 3.2 oz)   01/19/21 (!) 165 kg (363 lb 12.8 oz)                  Patient Active Problem List   Diagnosis     Morbid obesity due to excess calories (H)     HTN (hypertension)     Hyperlipidemia LDL goal <100     Type 2 diabetes mellitus with microalbuminuria, without long-term current use of insulin (H)     Chronic kidney disease, stage 2 (mild)     Past Surgical History:   Procedure Laterality Date     HC REPAIR ING HERNIA,5+Y/O,REDUCIBL  Age 6       Social History     Tobacco Use     Smoking status: Never Smoker     Smokeless tobacco: Never Used   Substance Use Topics     Alcohol use: Yes     Alcohol/week: 0.0 standard drinks     Comment: 2 times/ month     Family History   Problem Relation Age of Onset     Diabetes Father      Lipids Father      Hypertension Mother      Heart Disease Brother          Current Outpatient Medications   Medication Sig Dispense Refill     amLODIPine-valsartan (EXFORGE) 5-160 MG tablet TAKE 1 TABLET BY MOUTH ONCE DAILY **NEED  RECHECK** 30 tablet 0     glipiZIDE (GLUCOTROL XL) 10 MG 24 hr tablet Take 2 tablets (20 mg) by mouth daily 180 tablet 0     metFORMIN (GLUCOPHAGE) 1000 MG tablet TAKE 1 TABLET BY MOUTH TWICE DAILY WITH MEALS 180 tablet 0     rosuvastatin (CRESTOR) 20 MG tablet Take 1 tablet (20 mg) by mouth daily 90 tablet 3     sildenafil (REVATIO) 20 MG tablet 2-5 tabs daily prn 30 tablet 11     terbinafine (LAMISIL) 1 % external cream CHARLINE  TOPICALLY BID FOR FUNGAL INFECTION NOT RESOLVED WITH OTHER ANTIFUNGALS  1     Allergies   Allergen Reactions     Lisinopril Cough     Penicillins Rash     Recent Labs   Lab Test 01/11/22  0729 09/07/21  0825 01/19/21  1140 08/24/20  1048 01/31/20  1435 07/18/19  1609 02/04/19  1526 11/20/18  1523 08/20/18  1659 05/11/18  1621   A1C 9.2* 7.1* 6.4* 6.5*   < > 5.8*  --  6.2*   < > 9.4*   LDL  --  61  --  74  --   --   --  78  --   --    HDL  --  41  --  38*  --   --   --  39*  --   --    TRIG  --  134  --  125  --   --   --  113  --   --    CR  --  0.89  --  1.02  --  1.14  --   --    < > 0.98   GFRESTIMATED  --  >90  --  >90  --  81  --   --    < > 85   GFRESTBLACK  --   --   --  >90  --  >90  --   --    < > >90   POTASSIUM  --  4.4  --  4.2  --  3.9  --   --    < > 4.2   TSH  --   --   --   --   --   --  2.38  --   --  1.97    < > = values in this interval not displayed.        Reviewed and updated as needed this visit by clinical staff  Tobacco  Allergies  Meds   Med Hx  Surg Hx  Fam Hx  Soc Hx       Reviewed and updated as needed this visit by Provider               Past Medical History:   Diagnosis Date     Cellulitis-scrotum 8/15/2016     SIRS (systemic inflammatory response syndrome) (H) 8/16/2016      Past Surgical History:   Procedure Laterality Date     HC REPAIR ING HERNIA,5+Y/O,REDUCIBL  Age 6       Review of Systems   Constitutional: Negative for chills and fever.   HENT: Negative for congestion, ear pain, hearing loss and sore throat.    Eyes: Negative for pain and visual disturbance.   Respiratory: Negative for cough and shortness of breath.    Cardiovascular: Negative for chest pain, palpitations and peripheral edema.   Gastrointestinal: Negative for abdominal pain, constipation, diarrhea, heartburn, hematochezia and nausea.   Genitourinary: Negative for dysuria, frequency, genital sores, hematuria, impotence, penile discharge and urgency.   Musculoskeletal: Negative for arthralgias, joint  "swelling and myalgias.   Skin: Negative for rash.   Neurological: Negative for dizziness, weakness, headaches and paresthesias.   Psychiatric/Behavioral: Negative for mood changes. The patient is not nervous/anxious.      CONSTITUTIONAL: NEGATIVE for fever, chills, change in weight  INTEGUMENTARY/SKIN: NEGATIVE for worrisome rashes, moles or lesions  EYES: NEGATIVE for vision changes or irritation  ENT: NEGATIVE for ear, mouth and throat problems  RESP: NEGATIVE for significant cough or SOB  CV: NEGATIVE for chest pain, palpitations or peripheral edema  GI: NEGATIVE for nausea, abdominal pain, heartburn, or change in bowel habits   male: negative for dysuria, hematuria, decreased urinary stream, erectile dysfunction, urethral discharge  MUSCULOSKELETAL: NEGATIVE for significant arthralgias or myalgia  NEURO: NEGATIVE for weakness, dizziness or paresthesias  PSYCHIATRIC: NEGATIVE for changes in mood or affect    OBJECTIVE:   /82   Pulse 95   Temp 97.9  F (36.6  C) (Tympanic)   Resp 20   Ht 1.861 m (6' 1.25\")   Wt (!) 179.7 kg (396 lb 3.2 oz)   SpO2 92%   BMI 51.92 kg/m      Physical Exam  GENERAL: healthy, alert and no distress  EYES: Eyes grossly normal to inspection, PERRL and conjunctivae and sclerae normal  HENT: ear canals and TM's normal, nose and mouth without ulcers or lesions  NECK: no adenopathy, no asymmetry, masses, or scars and thyroid normal to palpation  RESP: lungs clear to auscultation - no rales, rhonchi or wheezes  CV: regular rate and rhythm, normal S1 S2, no S3 or S4, no murmur, click or rub, no peripheral edema and peripheral pulses strong  ABDOMEN: soft, nontender, no hepatosplenomegaly, no masses and bowel sounds normal  MS: no gross musculoskeletal defects noted, no edema  SKIN: no suspicious lesions or rashes  NEURO: Normal strength and tone, mentation intact and speech normal  PSYCH: mentation appears normal, affect normal/bright  Foot exam - both sides normal; no swelling, " "tenderness or skin or vascular lesions. Color and temperature is normal. Sensation is intact. Peripheral pulses are palpable. Toenails are normal.    Diagnostic Test Results:  Labs reviewed in Epic    ASSESSMENT/PLAN:       ICD-10-CM    1. Type 2 diabetes mellitus with microalbuminuria, without long-term current use of insulin (H)  E11.29 Hemoglobin A1c    R80.9 Hemoglobin A1c     AMB Adult Diabetes Educator Referral     glipiZIDE (GLUCOTROL XL) 10 MG 24 hr tablet     metFORMIN (GLUCOPHAGE) 1000 MG tablet   2. Routine general medical examination at a health care facility  Z00.00    3. Hyperlipidemia LDL goal <100  E78.5 Lipid panel reflex to direct LDL Fasting     Lipid panel reflex to direct LDL Fasting     rosuvastatin (CRESTOR) 20 MG tablet   4. Chronic kidney disease, stage 2 (mild)  N18.2    5. Class 3 severe obesity due to excess calories with serious comorbidity and body mass index (BMI) of 45.0 to 49.9 in adult (H)  E66.01     Z68.42    6. Gastroesophageal reflux disease without esophagitis  K21.9 XR Esophagram w Upper GI   7. Pharyngoesophageal dysphagia  R13.14 XR Esophagram w Upper GI     Inc glipizide.  work on lifestyle modification  Recheck dm in 3 mos   Patient has been advised of split billing requirements and indicates understanding: Yes  COUNSELING:   Reviewed preventive health counseling, as reflected in patient instructions       Regular exercise       Healthy diet/nutrition    Estimated body mass index is 51.92 kg/m  as calculated from the following:    Height as of this encounter: 1.861 m (6' 1.25\").    Weight as of this encounter: 179.7 kg (396 lb 3.2 oz).     Weight management plan: Discussed healthy diet and exercise guidelines    He reports that he has never smoked. He has never used smokeless tobacco.      Counseling Resources:  ATP IV Guidelines  Pooled Cohorts Equation Calculator  FRAX Risk Assessment  ICSI Preventive Guidelines  Dietary Guidelines for Americans, 2010  USDA's " MyPlate  ASA Prophylaxis  Lung CA Screening    [facility]

## 2022-01-12 ENCOUNTER — TELEPHONE (OUTPATIENT)
Dept: FAMILY MEDICINE | Facility: CLINIC | Age: 42
End: 2022-01-12
Payer: COMMERCIAL

## 2022-01-12 NOTE — TELEPHONE ENCOUNTER
Diabetes Education Scheduling Outreach #1:    Call to patient to schedule. Left message with phone number to call to schedule.    Plan for 2nd outreach attempt within 1 week.    Dori Reddy  [facility]  Diabetes and Nutrition Scheduling

## 2022-01-18 NOTE — TELEPHONE ENCOUNTER
Diabetes Education Scheduling Outreach #2:    Call to patient to schedule. Left message with phone number to call to schedule.    Dori Reddy  [facility]  Diabetes and Nutrition Scheduling

## 2022-02-01 ENCOUNTER — TELEPHONE (OUTPATIENT)
Dept: SLEEP MEDICINE | Facility: CLINIC | Age: 42
End: 2022-02-01
Payer: COMMERCIAL

## 2022-02-01 NOTE — TELEPHONE ENCOUNTER
Called patient to inform him that his appt with Maria Ines Huggins on 3/7 is being changed from video to in-person. LVM for him to call me back if he needs to reschedule   Transposition Flap Text: The defect edges were debeveled with a #15 scalpel blade.  Given the location of the defect and the proximity to free margins a transposition flap was deemed most appropriate.  Using a sterile surgical marker, an appropriate transposition flap was drawn incorporating the defect.    The area thus outlined was incised deep to adipose tissue with a #15 scalpel blade.  The skin margins were undermined to an appropriate distance in all directions utilizing iris scissors.

## 2022-02-08 ENCOUNTER — HOSPITAL ENCOUNTER (OUTPATIENT)
Dept: GENERAL RADIOLOGY | Facility: CLINIC | Age: 42
Discharge: HOME | End: 2022-02-08
Attending: PHYSICIAN ASSISTANT | Admitting: PHYSICIAN ASSISTANT
Payer: COMMERCIAL

## 2022-02-08 DIAGNOSIS — K21.9 GASTROESOPHAGEAL REFLUX DISEASE WITHOUT ESOPHAGITIS: ICD-10-CM

## 2022-02-08 DIAGNOSIS — R13.14 PHARYNGOESOPHAGEAL DYSPHAGIA: ICD-10-CM

## 2022-02-08 PROCEDURE — 74220 X-RAY XM ESOPHAGUS 1CNTRST: CPT

## 2022-02-08 PROCEDURE — 74240 X-RAY XM UPR GI TRC 1CNTRST: CPT

## 2022-02-08 PROCEDURE — 255N000001 HC RX 255: Performed by: RADIOLOGY

## 2022-02-08 RX ADMIN — ANTACID/ANTIFLATULENT 4 G: 380; 550; 10; 10 GRANULE, EFFERVESCENT ORAL at 09:30

## 2022-02-18 ENCOUNTER — LAB (OUTPATIENT)
Dept: LAB | Facility: CLINIC | Age: 42
End: 2022-02-18
Payer: COMMERCIAL

## 2022-02-18 DIAGNOSIS — R06.00 DYSPNEA AND RESPIRATORY ABNORMALITY: ICD-10-CM

## 2022-02-18 DIAGNOSIS — Z20.822 COVID-19 RULED OUT: ICD-10-CM

## 2022-02-18 DIAGNOSIS — R06.89 DYSPNEA AND RESPIRATORY ABNORMALITY: ICD-10-CM

## 2022-02-18 LAB
CPB POCT: NO
HCO3 BLDV-SCNC: 29 MMOL/L (ref 21–28)
HCT VFR BLD CALC: 45 % (ref 40–53)
HGB BLD-MCNC: 15.3 G/DL (ref 13.3–17.7)
PCO2 BLDV: 52 MM HG (ref 40–50)
PH BLDV: 7.36 [PH] (ref 7.32–7.43)
PO2 BLDV: 19 MM HG (ref 25–47)
POTASSIUM BLD-SCNC: 4.4 MMOL/L (ref 3.4–5.3)
SAO2 % BLDV: 28 % (ref 94–100)
SODIUM BLD-SCNC: 139 MMOL/L (ref 133–144)

## 2022-02-18 PROCEDURE — 84295 ASSAY OF SERUM SODIUM: CPT

## 2022-02-18 PROCEDURE — 84132 ASSAY OF SERUM POTASSIUM: CPT

## 2022-02-18 PROCEDURE — 82803 BLOOD GASES ANY COMBINATION: CPT

## 2022-02-20 ENCOUNTER — THERAPY VISIT (OUTPATIENT)
Dept: SLEEP MEDICINE | Facility: CLINIC | Age: 42
End: 2022-02-20
Payer: COMMERCIAL

## 2022-02-20 DIAGNOSIS — R06.83 SNORING: ICD-10-CM

## 2022-02-20 DIAGNOSIS — R53.83 MALAISE AND FATIGUE: ICD-10-CM

## 2022-02-20 DIAGNOSIS — R06.89 DYSPNEA AND RESPIRATORY ABNORMALITY: ICD-10-CM

## 2022-02-20 DIAGNOSIS — E66.813 CLASS 3 SEVERE OBESITY DUE TO EXCESS CALORIES WITH SERIOUS COMORBIDITY AND BODY MASS INDEX (BMI) OF 45.0 TO 49.9 IN ADULT (H): ICD-10-CM

## 2022-02-20 DIAGNOSIS — I10 ESSENTIAL HYPERTENSION: ICD-10-CM

## 2022-02-20 DIAGNOSIS — E66.01 CLASS 3 SEVERE OBESITY DUE TO EXCESS CALORIES WITH SERIOUS COMORBIDITY AND BODY MASS INDEX (BMI) OF 45.0 TO 49.9 IN ADULT (H): ICD-10-CM

## 2022-02-20 DIAGNOSIS — R06.00 DYSPNEA AND RESPIRATORY ABNORMALITY: ICD-10-CM

## 2022-02-20 DIAGNOSIS — R53.81 MALAISE AND FATIGUE: ICD-10-CM

## 2022-02-20 DIAGNOSIS — G47.30 SLEEP APNEA, UNSPECIFIED TYPE: ICD-10-CM

## 2022-02-20 DIAGNOSIS — R06.81 WITNESSED APNEIC SPELLS: ICD-10-CM

## 2022-02-21 ENCOUNTER — TELEPHONE (OUTPATIENT)
Dept: SLEEP MEDICINE | Facility: CLINIC | Age: 42
End: 2022-02-21
Payer: COMMERCIAL

## 2022-02-21 NOTE — TELEPHONE ENCOUNTER
Enriketcceli. Please call the patient to schedule for an earlier follow-up visit to review the results of the sleep study.  Please schedule patient to follow-up with me next week. May book on my lunch hour except for Wednesdays and Fridays.Thank you.

## 2022-02-22 ENCOUNTER — ALLIED HEALTH/NURSE VISIT (OUTPATIENT)
Dept: EDUCATION SERVICES | Facility: OTHER | Age: 42
End: 2022-02-22
Attending: PHYSICIAN ASSISTANT
Payer: COMMERCIAL

## 2022-02-22 DIAGNOSIS — E11.29 TYPE 2 DIABETES MELLITUS WITH MICROALBUMINURIA, WITHOUT LONG-TERM CURRENT USE OF INSULIN (H): ICD-10-CM

## 2022-02-22 DIAGNOSIS — R80.9 TYPE 2 DIABETES MELLITUS WITH MICROALBUMINURIA, WITHOUT LONG-TERM CURRENT USE OF INSULIN (H): ICD-10-CM

## 2022-02-22 LAB — SLPCOMP: NORMAL

## 2022-02-22 PROCEDURE — G0108 DIAB MANAGE TRN  PER INDIV: HCPCS | Mod: TEL | Performed by: DIETITIAN, REGISTERED

## 2022-02-22 RX ORDER — BLOOD SUGAR DIAGNOSTIC
STRIP MISCELLANEOUS
Qty: 50 STRIP | Refills: 3 | Status: SHIPPED | OUTPATIENT
Start: 2022-02-22 | End: 2024-08-15

## 2022-02-22 RX ORDER — FLASH GLUCOSE SENSOR
KIT MISCELLANEOUS
Qty: 2 EACH | Refills: 11 | Status: CANCELLED | OUTPATIENT
Start: 2022-02-22

## 2022-02-22 RX ORDER — LANCETS 33 GAUGE
1 EACH MISCELLANEOUS 2 TIMES DAILY
Qty: 100 EACH | Refills: 3 | Status: SHIPPED | OUTPATIENT
Start: 2022-02-22 | End: 2024-08-15

## 2022-02-22 RX ORDER — DULAGLUTIDE 0.75 MG/.5ML
0.75 INJECTION, SOLUTION SUBCUTANEOUS
Status: CANCELLED | OUTPATIENT
Start: 2022-02-22

## 2022-02-22 RX ORDER — SEMAGLUTIDE 1.34 MG/ML
INJECTION, SOLUTION SUBCUTANEOUS
Status: CANCELLED | OUTPATIENT
Start: 2022-02-22

## 2022-02-22 NOTE — PROGRESS NOTES
"Diabetes Self-Management Education & Support    Presents for: Individual review    SUBJECTIVE/OBJECTIVE:  Presents for: Individual review  Accompanied by: Self  Diabetes education in the past 24mo: No  Focus of Visit: Being Active, Healthy Eating  Diabetes type: Type 2  Date of diagnosis:  (2016)  Disease course: Getting harder to manage  How confident are you filling out medical forms by yourself:: Not Assessed  Diabetes management related comments/concerns: A12 has gone up over the past year, mostly due to change of job causing me to be more sedentary.  Transportation concerns: No  Difficulty affording diabetes medication?: No  Difficulty affording diabetes testing supplies?: No  Other concerns:: None  Cultural Influences/Ethnic Background:  Not  or     Diabetes Symptoms & Complications:  Fatigue: Sometimes  Neuropathy: No  Polydipsia: No  Polyphagia: No  Polyuria: No  Slow healing wounds: No  Weight trend: Increasing (Says up 20#)  Autonomic neuropathy: No  CVA: No  Heart disease: No  Nephropathy: No  Peripheral neuropathy: No  Peripheral Vascular Disease: No  Retinopathy: No  Sexual dysfunction: No    Patient Problem List and Family Medical History reviewed for relevant medical history, current medical status, and diabetes risk factors.    Vitals:  There were no vitals taken for this visit.  Estimated body mass index is 51.92 kg/m  as calculated from the following:    Height as of 1/11/22: 1.861 m (6' 1.25\").    Weight as of 1/11/22: 179.7 kg (396 lb 3.2 oz).   Last 3 BP:   BP Readings from Last 3 Encounters:   01/11/22 129/82   09/07/21 (!) 137/96   01/19/21 128/88       History   Smoking Status     Never Smoker   Smokeless Tobacco     Never Used       Labs:  Lab Results   Component Value Date    A1C 9.2 01/11/2022    A1C 6.4 01/19/2021     Lab Results   Component Value Date     09/07/2021     08/24/2020     Lab Results   Component Value Date     01/11/2022    LDL 74 " 08/24/2020     HDL Cholesterol   Date Value Ref Range Status   08/24/2020 38 (L) >39 mg/dL Final     Direct Measure HDL   Date Value Ref Range Status   01/11/2022 40 >=40 mg/dL Final   ]  GFR Estimate   Date Value Ref Range Status   09/07/2021 >90 >60 mL/min/1.73m2 Final     Comment:     As of July 11, 2021, eGFR is calculated by the CKD-EPI creatinine equation, without race adjustment. eGFR can be influenced by muscle mass, exercise, and diet. The reported eGFR is an estimation only and is only applicable if the renal function is stable.   08/24/2020 >90 >60 mL/min/[1.73_m2] Final     Comment:     Non  GFR Calc  Starting 12/18/2018, serum creatinine based estimated GFR (eGFR) will be   calculated using the Chronic Kidney Disease Epidemiology Collaboration   (CKD-EPI) equation.       GFR Estimate If Black   Date Value Ref Range Status   08/24/2020 >90 >60 mL/min/[1.73_m2] Final     Comment:      GFR Calc  Starting 12/18/2018, serum creatinine based estimated GFR (eGFR) will be   calculated using the Chronic Kidney Disease Epidemiology Collaboration   (CKD-EPI) equation.       Lab Results   Component Value Date    CR 0.89 09/07/2021    CR 1.02 08/24/2020     No results found for: MICROALBUMIN    Healthy Eating:  Healthy Eating Assessed Today: Yes  Cultural/Jew diet restrictions?: (P) No  Meal planning/habits: (P) Avoiding sweets, Low carb, Smaller portions  Meals include: Breakfast, Lunch, Dinner  Breakfast: 0800 usually an egg or 2 torilla or english muffin ham and cheese, coffee (splash of half and half)  Lunch: 5012-9662 sandwich or leftovers from night before.  Turkey and cheese, ham and cheese or RB sandwich, tries to have some fruit with it or chips.  water and diet soda  Dinner: 1800 Pasta sometimes, meat and potatoes, soup, tries to  not eat too much fast food.  protein carb and vegetable  Snacks: usually will have popcorn or chips or another fruit; in the evening might  "have popcorn.  Might have a cookie - tries not to keep too many sweets in the house  Beverages: Water, Coffee, Diet soda, Milk (Glass of milk with dinner)    Being Active:  Being Active Assessed Today: Yes (Says never received intital Diabetes education only in hospital)  Exercise:: Currently not exercising  Barrier to exercise: Time (Says needs to \"get on this\")    Monitoring:  Monitoring Assessed Today: No  Did patient bring glucose meter to appointment? : No  Blood Glucose Meter: (P) Unknown  Times checking blood sugar at home (number): (P) Never  Times checking blood sugar at home (per): (P) Month  Blood glucose trend: (P) Increasing    Says is  now and not active.  At one point was working with [employer] 10 miles per day    Since last summer    Needs new testing strip    Had COVID Dec      Taking Medications:  Diabetes Medication(s)     Biguanides       metFORMIN (GLUCOPHAGE) 1000 MG tablet    TAKE 1 TABLET BY MOUTH TWICE DAILY WITH MEALS    Sulfonylureas       glipiZIDE (GLUCOTROL XL) 10 MG 24 hr tablet    Take 2 tablets (20 mg) by mouth daily          Taking Medication Assessed Today: Yes  Current Treatments: (P) Diet, Oral Medication (taken by mouth)  Diabetes medication side effects?: No    Problem Solving:  Problem Solving Assessed Today: No  Is the patient at risk for hypoglycemia?: Yes  Hypoglycemia Frequency: Never              Reducing Risks:  Reducing Risks Assessed Today: Yes  Diabetes Risks: Family History  CAD Risks: (P) Diabetes Mellitus, Dyslipidemia, Hypertension, Male sex, Obesity, Sedentary lifestyle  Has dilated eye exam at least once a year?: (P) Yes  Sees dentist every 6 months?: (P) No  Feet checked by healthcare provider in the last year?: (P) Yes    Healthy Coping:  Healthy Coping Assessed Today: Yes  Emotional response to diabetes: Ready to learn, Acceptance  Informal Support system:: (P) Spouse  Stage of change: PREPARATION (Decided to change - considering how)  Patient " Activation Measure Survey Score:  No flowsheet data found.    Diabetes knowledge and skills assessment:   Patient is knowledgeable in diabetes management concepts related to: Taking Medication    Patient needs further education on the following diabetes management concepts: Healthy Eating, Being Active and Monitoring    Based on learning assessment above, most appropriate setting for further diabetes education would be: Individual setting.      INTERVENTIONS:    Education provided today on:  AADE Self-Care Behaviors:  Monitoring  Taking Medication: action of prescribed medication and when to take medications    Opportunities for ongoing education and support in diabetes-self management were discussed.    Pt verbalized understanding of concepts discussed and recommendations provided today.       Education Materials Provided:  AVS      ASSESSMENT:  Pt is meeting with Diabetes Education today as his A1C has increased.  Pt mentions he used to have a very active job with [employer], and changed to [employer] job.  Says blood sugars have gone up since then.  Also mentions he had COVID in December.  Pt hasn't been checking blood sugars very often - gave new meter today and ordered testing strips.  Pt would also consider trying personal CGM, so will pend to PCP.  Pt says he never had formal Diabetes Education when diagnosed, so will follow up with this.  Also discussed different med options like GLP-1 and SGLT2 and pt would like to hold off on these.  Follow up in about 1 month.        Patient's most recent   Lab Results   Component Value Date    A1C 9.2 01/11/2022    A1C 6.4 01/19/2021    is not meeting goal of <7.0    PLAN  See Patient Instructions for co-developed, patient-stated behavior change goals.  AVS printed and provided to patient today. See Follow-Up section for recommended follow-up.      Time Spent: 60 minutes  Encounter Type: Individual    Any diabetes medication dose changes were made via the CDE Protocol and  Collaborative Practice Agreement with the patient's referring provider.     Tiffany Bacon RD [facility]  [phone]

## 2022-02-22 NOTE — LETTER
"    2/22/2022         RE: Geoffrey Wilson  [id number] [place] [facility] [place] [facility] [address]        Dear Colleague,    Thank you for referring your patient, Geoffrey Wilson, to the [facility]. Please see a copy of my visit note below.    Diabetes Self-Management Education & Support    Presents for: Individual review    SUBJECTIVE/OBJECTIVE:  Presents for: Individual review  Accompanied by: Self  Diabetes education in the past 24mo: No  Focus of Visit: Being Active, Healthy Eating  Diabetes type: Type 2  Date of diagnosis:  (2016)  Disease course: Getting harder to manage  How confident are you filling out medical forms by yourself:: Not Assessed  Diabetes management related comments/concerns: A12 has gone up over the past year, mostly due to change of job causing me to be more sedentary.  Transportation concerns: No  Difficulty affording diabetes medication?: No  Difficulty affording diabetes testing supplies?: No  Other concerns:: None  Cultural Influences/Ethnic Background:  Not  or     Diabetes Symptoms & Complications:  Fatigue: Sometimes  Neuropathy: No  Polydipsia: No  Polyphagia: No  Polyuria: No  Slow healing wounds: No  Weight trend: Increasing (Says up 20#)  Autonomic neuropathy: No  CVA: No  Heart disease: No  Nephropathy: No  Peripheral neuropathy: No  Peripheral Vascular Disease: No  Retinopathy: No  Sexual dysfunction: No    Patient Problem List and Family Medical History reviewed for relevant medical history, current medical status, and diabetes risk factors.    Vitals:  There were no vitals taken for this visit.  Estimated body mass index is 51.92 kg/m  as calculated from the following:    Height as of 1/11/22: 1.861 m (6' 1.25\").    Weight as of 1/11/22: 179.7 kg (396 lb 3.2 oz).   Last 3 BP:   BP Readings from Last 3 Encounters:   01/11/22 129/82   09/07/21 (!) 137/96   01/19/21 128/88       History   Smoking Status     Never Smoker   Smokeless Tobacco     Never Used "       Labs:  Lab Results   Component Value Date    A1C 9.2 01/11/2022    A1C 6.4 01/19/2021     Lab Results   Component Value Date     09/07/2021     08/24/2020     Lab Results   Component Value Date     01/11/2022    LDL 74 08/24/2020     HDL Cholesterol   Date Value Ref Range Status   08/24/2020 38 (L) >39 mg/dL Final     Direct Measure HDL   Date Value Ref Range Status   01/11/2022 40 >=40 mg/dL Final   ]  GFR Estimate   Date Value Ref Range Status   09/07/2021 >90 >60 mL/min/1.73m2 Final     Comment:     As of July 11, 2021, eGFR is calculated by the CKD-EPI creatinine equation, without race adjustment. eGFR can be influenced by muscle mass, exercise, and diet. The reported eGFR is an estimation only and is only applicable if the renal function is stable.   08/24/2020 >90 >60 mL/min/[1.73_m2] Final     Comment:     Non  GFR Calc  Starting 12/18/2018, serum creatinine based estimated GFR (eGFR) will be   calculated using the Chronic Kidney Disease Epidemiology Collaboration   (CKD-EPI) equation.       GFR Estimate If Black   Date Value Ref Range Status   08/24/2020 >90 >60 mL/min/[1.73_m2] Final     Comment:      GFR Calc  Starting 12/18/2018, serum creatinine based estimated GFR (eGFR) will be   calculated using the Chronic Kidney Disease Epidemiology Collaboration   (CKD-EPI) equation.       Lab Results   Component Value Date    CR 0.89 09/07/2021    CR 1.02 08/24/2020     No results found for: MICROALBUMIN    Healthy Eating:  Healthy Eating Assessed Today: Yes  Cultural/Restoration diet restrictions?: (P) No  Meal planning/habits: (P) Avoiding sweets, Low carb, Smaller portions  Meals include: Breakfast, Lunch, Dinner  Breakfast: 0800 usually an egg or 2 torilla or english muffin ham and cheese, coffee (splash of half and half)  Lunch: 2162-0872 sandwich or leftovers from night before.  Turkey and cheese, ham and cheese or RB sandwich, tries to have some fruit  "with it or chips.  water and diet soda  Dinner: 1800 Pasta sometimes, meat and potatoes, soup, tries to  not eat too much fast food.  protein carb and vegetable  Snacks: usually will have popcorn or chips or another fruit; in the evening might have popcorn.  Might have a cookie - tries not to keep too many sweets in the house  Beverages: Water, Coffee, Diet soda, Milk (Glass of milk with dinner)    Being Active:  Being Active Assessed Today: Yes (Says never received intital Diabetes education only in hospital)  Exercise:: Currently not exercising  Barrier to exercise: Time (Says needs to \"get on this\")    Monitoring:  Monitoring Assessed Today: No  Did patient bring glucose meter to appointment? : No  Blood Glucose Meter: (P) Unknown  Times checking blood sugar at home (number): (P) Never  Times checking blood sugar at home (per): (P) Month  Blood glucose trend: (P) Increasing    Says is  now and not active.  At one point was working with [employer] 10 miles per day    Since last summer    Needs new testing strip    Had COVID Dec      Taking Medications:  Diabetes Medication(s)     Biguanides       metFORMIN (GLUCOPHAGE) 1000 MG tablet    TAKE 1 TABLET BY MOUTH TWICE DAILY WITH MEALS    Sulfonylureas       glipiZIDE (GLUCOTROL XL) 10 MG 24 hr tablet    Take 2 tablets (20 mg) by mouth daily          Taking Medication Assessed Today: Yes  Current Treatments: (P) Diet, Oral Medication (taken by mouth)  Diabetes medication side effects?: No    Problem Solving:  Problem Solving Assessed Today: No  Is the patient at risk for hypoglycemia?: Yes  Hypoglycemia Frequency: Never              Reducing Risks:  Reducing Risks Assessed Today: Yes  Diabetes Risks: Family History  CAD Risks: (P) Diabetes Mellitus, Dyslipidemia, Hypertension, Male sex, Obesity, Sedentary lifestyle  Has dilated eye exam at least once a year?: (P) Yes  Sees dentist every 6 months?: (P) No  Feet checked by healthcare provider in the last " year?: (P) Yes    Healthy Coping:  Healthy Coping Assessed Today: Yes  Emotional response to diabetes: Ready to learn, Acceptance  Informal Support system:: (P) Spouse  Stage of change: PREPARATION (Decided to change - considering how)  Patient Activation Measure Survey Score:  No flowsheet data found.    Diabetes knowledge and skills assessment:   Patient is knowledgeable in diabetes management concepts related to: Taking Medication    Patient needs further education on the following diabetes management concepts: Healthy Eating, Being Active and Monitoring    Based on learning assessment above, most appropriate setting for further diabetes education would be: Individual setting.      INTERVENTIONS:    Education provided today on:  AADE Self-Care Behaviors:  Monitoring  Taking Medication: action of prescribed medication and when to take medications    Opportunities for ongoing education and support in diabetes-self management were discussed.    Pt verbalized understanding of concepts discussed and recommendations provided today.       Education Materials Provided:  AVS      ASSESSMENT:  Pt is meeting with Diabetes Education today as his A1C has increased.  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  Says blood sugars have gone up since then.  Also mentions he had COVID in December.  Pt hasn't been checking blood sugars very often - gave new meter today and ordered testing strips.  Pt would also consider trying personal CGM, so will pend to PCP.  Pt says he never had formal Diabetes Education when diagnosed, so will follow up with this.  Also discussed different med options like GLP-1 and SGLT2 and pt would like to hold off on these.  Follow up in about 1 month.        Patient's most recent   Lab Results   Component Value Date    A1C 9.2 01/11/2022    A1C 6.4 01/19/2021    is not meeting goal of <7.0    PLAN  See Patient Instructions for co-developed, patient-stated behavior  change goals.  AVS printed and provided to patient today. See Follow-Up section for recommended follow-up.      Time Spent: 60 minutes  Encounter Type: Individual    Any diabetes medication dose changes were made via the CDE Protocol and Collaborative Practice Agreement with the patient's referring provider.     Tiffany Bacon RD [facility]  [phone]

## 2022-02-22 NOTE — PATIENT INSTRUCTIONS
1.  Download freestyle gary link Kiana     Would put it on and try it out!  Scan before meals and before med        2.  Jardiance and Farxiga and Ozempic and Trulicity for insurance coverage    3.  Check blood sugars 1-2x per day  Fasting, you could check before another meal during the day and 2 hours after the start of that meal    Goal is  before meals; and less than 180 (2 hours after start of meal)

## 2022-02-24 ENCOUNTER — TELEPHONE (OUTPATIENT)
Dept: EDUCATION SERVICES | Facility: OTHER | Age: 42
End: 2022-02-24
Payer: COMMERCIAL

## 2022-02-24 DIAGNOSIS — R80.9 TYPE 2 DIABETES MELLITUS WITH MICROALBUMINURIA, WITHOUT LONG-TERM CURRENT USE OF INSULIN (H): Primary | ICD-10-CM

## 2022-02-24 DIAGNOSIS — E11.29 TYPE 2 DIABETES MELLITUS WITH MICROALBUMINURIA, WITHOUT LONG-TERM CURRENT USE OF INSULIN (H): Primary | ICD-10-CM

## 2022-02-24 NOTE — TELEPHONE ENCOUNTER
Aaron Hernandez interested in monitoring blood sugars with freestyle gary.  Will use phone to read it.    Orders pended.  Please review and sign.    Thanks    Tiffany Bacon RD [facility]  [phone]

## 2022-02-25 RX ORDER — FLASH GLUCOSE SENSOR
KIT MISCELLANEOUS
Qty: 2 EACH | Refills: 11 | Status: SHIPPED | OUTPATIENT
Start: 2022-02-25 | End: 2023-07-12

## 2022-03-03 ENCOUNTER — TELEPHONE (OUTPATIENT)
Dept: SLEEP MEDICINE | Facility: CLINIC | Age: 42
End: 2022-03-03

## 2022-03-03 ENCOUNTER — VIRTUAL VISIT (OUTPATIENT)
Dept: SLEEP MEDICINE | Facility: CLINIC | Age: 42
End: 2022-03-03
Payer: COMMERCIAL

## 2022-03-03 VITALS — WEIGHT: 315 LBS | BODY MASS INDEX: 40.43 KG/M2 | HEIGHT: 74 IN

## 2022-03-03 DIAGNOSIS — G47.33 OBSTRUCTIVE SLEEP APNEA: Primary | ICD-10-CM

## 2022-03-03 DIAGNOSIS — G47.34 SLEEP RELATED HYPOXIA: ICD-10-CM

## 2022-03-03 PROCEDURE — 99213 OFFICE O/P EST LOW 20 MIN: CPT | Mod: GT | Performed by: INTERNAL MEDICINE

## 2022-03-03 ASSESSMENT — SLEEP AND FATIGUE QUESTIONNAIRES
HOW LIKELY ARE YOU TO NOD OFF OR FALL ASLEEP WHILE SITTING AND READING: WOULD NEVER DOZE
HOW LIKELY ARE YOU TO NOD OFF OR FALL ASLEEP WHILE SITTING AND TALKING TO SOMEONE: WOULD NEVER DOZE
HOW LIKELY ARE YOU TO NOD OFF OR FALL ASLEEP WHEN YOU ARE A PASSENGER IN A CAR FOR AN HOUR WITHOUT A BREAK: SLIGHT CHANCE OF DOZING
HOW LIKELY ARE YOU TO NOD OFF OR FALL ASLEEP WHILE LYING DOWN TO REST IN THE AFTERNOON WHEN CIRCUMSTANCES PERMIT: HIGH CHANCE OF DOZING
HOW LIKELY ARE YOU TO NOD OFF OR FALL ASLEEP WHILE WATCHING TV: MODERATE CHANCE OF DOZING
HOW LIKELY ARE YOU TO NOD OFF OR FALL ASLEEP IN A CAR, WHILE STOPPED FOR A FEW MINUTES IN TRAFFIC: WOULD NEVER DOZE
HOW LIKELY ARE YOU TO NOD OFF OR FALL ASLEEP WHILE SITTING INACTIVE IN A PUBLIC PLACE: SLIGHT CHANCE OF DOZING
HOW LIKELY ARE YOU TO NOD OFF OR FALL ASLEEP WHILE SITTING QUIETLY AFTER LUNCH WITHOUT ALCOHOL: SLIGHT CHANCE OF DOZING

## 2022-03-03 NOTE — PATIENT INSTRUCTIONS
"     What is sleep apnea?     Sleep apnea is a condition that makes you stop breathing for short periods while you are asleep. There are 2 types of sleep apnea. One is called \"obstructive sleep apnea,\" and the other is called \"central sleep apnea.\"  In obstructive sleep apnea, you stop breathing because your throat narrows or closes (figure 1). In central sleep apnea, you stop breathing because your brain does not send the right signals to your muscles to make you breathe. When people talk about sleep apnea, they are usually referring to obstructive sleep apnea, which is what this article is about.  People with sleep apnea do not know that they stop breathing when they are asleep. But they do sometimes wake up startled or gasping for breath. They also often hear from loved ones that they snore.  What are the symptoms of sleep apnea? -- The main symptoms of sleep apnea are loud snoring, tiredness, and daytime sleepiness. Other symptoms can include:  ?Restless sleep  ?Waking up choking or gasping  ?Morning headaches, dry mouth, or sore throat  ?Waking up often to urinate  ?Waking up feeling unrested or groggy  ?Trouble thinking clearly or remembering things  Some people with sleep apnea don't have symptoms, or they don't know they have them. They might figure that it's normal to be tired or to snore a lot.  Should I see a doctor or nurse? -- Yes. If you think you might have sleep apnea, see your doctor.  Is there a test for sleep apnea? -- Yes. If your doctor or nurse suspects you have sleep apnea, he or she might send you for a \"sleep study.\" Sleep studies can sometimes be done at home, but they are usually done in a sleep lab. For the study, you spend the night in the lab, and you are hooked up to different machines that monitor your heart rate, breathing, and other body functions. The results of the test will tell your doctor or nurse if you have the disorder.  Is there anything I can do on my own to help my sleep " "apnea? -- Yes. Here are some things that might help:  ?Stay off your back when sleeping. (This is not always practical, because people cannot control their position while asleep. Plus, it only helps some people.)  ?Lose weight, if you are overweight  ?Avoid alcohol, because it can make sleep apnea worse  How is sleep apnea treated? -- The most effective treatment for sleep apnea is a device that keeps your airway open while you sleep. Treatment with this device is called \"continuous positive airway pressure,\" or CPAP. People getting CPAP wear a face mask at night that keeps them breathing (figure 2).  If your doctor or nurse recommends a CPAP machine, try to be patient about using it. The mask might seem uncomfortable to wear at first, and the machine might seem noisy, but using the machine can really pay off. People with sleep apnea who use a CPAP machine feel more rested and generally feel better.  There is also another device that you wear in your mouth called an \"oral appliance\" or \"mandibular advancement device.\" It also helps keep your airway open while you sleep.  In rare cases, when nothing else helps, doctors recommend surgery to keep the airway open. Surgery to do this is not always effective, and even when it is, the problem can come back.  Is sleep apnea dangerous? -- It can be. People with sleep apnea do not get good-quality sleep, so they are often tired and not alert. This puts them at risk for car accidents and other types of accidents. Plus, studies show that people with sleep apnea are more likely than others to have high blood pressure, heart attacks, and other serious heart problems. In people with severe sleep apnea, getting treated (for example, with a CPAP machine) can help prevent some of these problems.     GRAPHICS  Airway in a person with sleep apnea    Normally when a person sleeps, the airway remains open, and air can pass from the nose and mouth to the lungs. In a person with sleep " apnea, parts of the throat and mouth drop into the airway and block off the flow of air. This can cause loud snoring and interrupt breathing for short periods.  Graphic 87427 Version 5.0      Continuous positive airway pressure (CPAP) for sleep apnea    The CPAP mask gently blows air into your nose while you sleep. It puts just enough pressure on your airway to keep it from closing. The mask in this picture fits over just the nose. Other CPAP devices have masks that fit over the nose and mouth.     Equipment Instructions    We will process your PAP order and send it to a Durable Medical Equipment (DME) provider.    The medical equipment company should call you within 7 days.  If you have not heard from the company, please contact them to see if they received your order and are planning to call you.    Please call us at [phone] if you are unable to contact the medical equipment company or if they do not have the order.    If you are starting a new PAP machine, please call us after you use it the first night to let us know how it went. This call also helps us know that you received your equipment and that everything is ready. Please use our central phone number [phone]    Contact information for [employer] company:    [employer] Tel: [phone]

## 2022-03-03 NOTE — PROGRESS NOTES
Geoffrey is a 41 year old who is being evaluated via a billable video visit.      How would you like to obtain your AVS? MyChart  If the video visit is dropped, the invitation should be resent by: Text to cell phone: [phone]  Will anyone else be joining your video visit? No    Video Start Time: 12:24 PM  Video-Visit Details    Type of service:  Video Visit    Video End Time:12:32 PM    Originating Location (pt. Location): Home    Distant Location (provider location):  [facility]     Platform used for Video Visit: Collisionable     Additional 15 minutes on the date of service was spent performing the following:    -Preparing to see the patient (eg, review of tests)   -Obtaining and/or reviewing separately obtained history   -Ordering medications, tests, or procedures   -Documenting clinical information in the electronic or other health record     Thank you for the opportunity to participate in the care of Geoffrey Wilson.     He is a 41 year old  male patient who comes to the sleep medicine clinic for review of sleep study results. The study was completed on 02/20/22  which showed that the patient had severe obstructive sleep apnea with an apnea hypopnea index of 136.8 events per hour with the lowest O2 Sat of 72.6%. The patient was also found to have sleep related hypoxia. There were also bursts of tachycardia intermittently throughout the night.    Assessment and Plan:  In summary Geoffrey Wilson is a 41 year old year old male here for review of sleep study results.  1. Obstructive Sleep Apnea/Sleep related hypoxia  We had an extensive conversation to review the results of his sleep study and to  him on the importance of treating sleep apnea. We discussed the options of in lab titration study versus home auto CPAP. Patient decided to proceed with CPAP. He will start using the device as soon as he receives it with the intention to use if for the entire night. We discussed some tips to  increase PAP tolerance as well as the normal curve of adaptation. CPAP is going to provide improved respiratory function during the night but it can cause some sleep disruption that tends to improve with continuous usage. He should return to the clinic in 7 weeks to review compliance and efficacy monitoring. Since the patient does not have any preference, we will use [employer] as the durable medical equipment company.     RICARDA:  RICARDA Total Score: 12  Total score - [place]: 8 (3/3/2022 12:13 PM)    Patient Active Problem List   Diagnosis     Morbid obesity due to excess calories (H)     HTN (hypertension)     Hyperlipidemia LDL goal <100     Type 2 diabetes mellitus with microalbuminuria, without long-term current use of insulin (H)     Chronic kidney disease, stage 2 (mild)       Current Outpatient Medications   Medication Sig Dispense Refill     amLODIPine-valsartan (EXFORGE) 5-160 MG tablet TAKE 1 TABLET BY MOUTH ONCE DAILY 90 tablet 1     Continuous Blood Gluc  (FREESTYLE MARYJO READER) POLI 1 Device daily 1 each 0     Continuous Blood Gluc Sensor (FREESTYLE MARYJO 14 DAY SENSOR) MISC Change every 14 days. 2 each 11     Continuous Blood Gluc Sensor (FREESTYLE MARYJO 14 DAY SENSOR) MISC Change every 14 days. 2 each 11     glipiZIDE (GLUCOTROL XL) 10 MG 24 hr tablet Take 2 tablets (20 mg) by mouth daily 180 tablet 0     metFORMIN (GLUCOPHAGE) 1000 MG tablet TAKE 1 TABLET BY MOUTH TWICE DAILY WITH MEALS 180 tablet 0     OneTouch Delica Lancets 33G MISC 1 each 2 times daily 100 each 3     ONETOUCH VERIO IQ test strip Use to test blood sugar 1-2 times daily or as directed. 50 strip 3     rosuvastatin (CRESTOR) 20 MG tablet Take 1 tablet (20 mg) by mouth daily 90 tablet 3     sildenafil (REVATIO) 20 MG tablet 2-5 tabs daily prn 30 tablet 11     terbinafine (LAMISIL) 1 % external cream CHARLINE TOPICALLY BID FOR FUNGAL INFECTION NOT RESOLVED WITH OTHER ANTIFUNGALS  1       Allergies   Allergen Reactions      Lisinopril Cough     Penicillins Rash       Physical Exam:  GEN: NAD  Psych: normal mood, normal affect     Labs/Studies:  - We reviewed the results of the overnight study as described on the HPI.     No results found for: PH, PHARTERIAL, PO2, [id number], SAT, PCO2, HCO3, BASEEXCESS, ROMAN, BEB  Lab Results   Component Value Date    TSH 2.38 02/04/2019    TSH 1.97 05/11/2018     Lab Results   Component Value Date     (H) 09/07/2021     (H) 08/24/2020     Lab Results   Component Value Date    HGB 15.3 02/18/2022    HGB 11.9 (L) 08/24/2016     Lab Results   Component Value Date    BUN 15 09/07/2021    BUN 23 08/24/2020    CR 0.89 09/07/2021    CR 1.02 08/24/2020     No results found for: AST, ALT, GGT, ALKPHOS, BILITOTAL, BILICONJ, BILIDIRECT, SHINE  No results found for: UAMP, UBARB, BENZODIAZEUR, UCANN, UCOC, OPIT, UPCP    Recent Labs   Lab Test 02/18/22  1615 09/07/21  0825 08/24/20  1048    138 141   POTASSIUM 4.4 4.4 4.2   CHLORIDE  --  109 109   CO2  --  24 27   ANIONGAP  --  5 5   GLC  --  186* 134*   BUN  --  15 23   CR  --  0.89 1.02   COURTNEY  --  9.1 8.9       No results found for: KARI      Patient verbalized understanding of these issues, agrees with the plan and all questions were answered today. Patient was given an opportuntity to voice any other symptoms or concerns not listed above. Patient did not have any other symptoms or concerns.      Rodri Lan DO  Board Certified in Internal Medicine and Sleep Medicine      (Note created with Dragon voice recognition and unintended spelling errors and word substitutions may occur)

## 2022-03-16 NOTE — NURSING NOTE
DME orders have been automatically faxed to [facility].  My Chart message will be sent to patient in 2 months:  Geoffrey Wilson  [id number] [facility] [address]       2022      Dear Mr. Wilson,            Your provider has placed an order for you to get a new PAP device. Your medical equipment company will try to contact you as soon as possible to schedule your set up (Please be advised, due to a shortage of machines, this may take longer to receive call). Once you know the date of this appointment, please contact our office to schedule a follow up visit with your provider. This appointment should be scheduled 30-60 days from the day that you will be set up on your new device.     [facility] contact information:     [facility]: [phone]  [place]: [phone]  [place]: [phone]  [phone]  [place]: [phone]  [place]: [phone]        [facility]   Schedule line: [phone]    Sincerely,    Ester [facility]  Sleep Medicine

## 2022-03-21 ENCOUNTER — TELEPHONE (OUTPATIENT)
Dept: SLEEP MEDICINE | Facility: CLINIC | Age: 42
End: 2022-03-21
Payer: COMMERCIAL

## 2022-03-21 NOTE — TELEPHONE ENCOUNTER
You are scheduled for a PAP setup at the [facility] show[facility] on 3/25/22 at 9am with Catalina.  The address for the [place] [facility] is [address] [place] [address], [address], [place] [address] (located in the [facility]). To contact the [facility] [place] [facility], please call [phone] and press the option for the showroom. To contact  [facility] customer service, call [phone], Monday - Friday, 8:00 am - 4:30 pm. Please call if you need to make any changes to your appointment. Please contact your insurance company prior to appointment to confirm your benefits for durable medical equipment.

## 2022-03-25 ENCOUNTER — DOCUMENTATION ONLY (OUTPATIENT)
Dept: SLEEP MEDICINE | Facility: CLINIC | Age: 42
End: 2022-03-25
Payer: COMMERCIAL

## 2022-03-25 NOTE — PROGRESS NOTES
Patient was offered choice of vendor and chose [facility].  Patient Geoffrey Wilson was set up at [facility]  on March 25, 2022. Patient received a Resmed Airsense 11 Pressures were set at 5-20 cm H2O.   Patient s ramp is off cm H2O for Off and FLEX/EPR is 2.  Patient received a Resmed Mask name: [id number]  Nasal mask size Medium, heated tubing and heated humidifier.  Patient does need to meet compliance. Patient has a follow up on 5/29/22 with Dr. Lan.    Catalina Ayala

## 2022-03-28 ENCOUNTER — DOCUMENTATION ONLY (OUTPATIENT)
Dept: SLEEP MEDICINE | Facility: CLINIC | Age: 42
End: 2022-03-28
Payer: COMMERCIAL

## 2022-03-28 NOTE — PROGRESS NOTES
3 day Sleep therapy management telephone visit    Diagnostic AHI: 136.8  PSG    Confirmed with patient at time of call- N/A Patient is still interested in STM service       Message left for patient to return call        Objective data     Order Settings for PAP  CPAP min 5    CPAP max 20        Device settings from machine CPAP min 5.0     CPAP max 20.0      EPR Setting TWO    RESMED soft response  OFF     Assessment: [place] usage under four hours      Action plan: Patient to have 14 day STM visit. Patient has a follow up visit scheduled:   yes within 31-90 days of set up    Replacement device: No  STM ordered by provider: Yes     Total time spent on accessing and  interpreting remote patient PAP therapy data  10 minutes    Total time spent counseling, coaching  and reviewing PAP therapy data with patient  1 minutes    [id number] no

## 2022-04-11 ENCOUNTER — OFFICE VISIT (OUTPATIENT)
Dept: FAMILY MEDICINE | Facility: CLINIC | Age: 42
End: 2022-04-11
Payer: COMMERCIAL

## 2022-04-11 ENCOUNTER — DOCUMENTATION ONLY (OUTPATIENT)
Dept: SLEEP MEDICINE | Facility: CLINIC | Age: 42
End: 2022-04-11

## 2022-04-11 VITALS
DIASTOLIC BLOOD PRESSURE: 88 MMHG | TEMPERATURE: 98.8 F | HEART RATE: 80 BPM | HEIGHT: 74 IN | BODY MASS INDEX: 40.43 KG/M2 | SYSTOLIC BLOOD PRESSURE: 133 MMHG | OXYGEN SATURATION: 96 % | WEIGHT: 315 LBS | RESPIRATION RATE: 20 BRPM

## 2022-04-11 DIAGNOSIS — E11.29 TYPE 2 DIABETES MELLITUS WITH MICROALBUMINURIA, WITHOUT LONG-TERM CURRENT USE OF INSULIN (H): Primary | ICD-10-CM

## 2022-04-11 DIAGNOSIS — R80.9 TYPE 2 DIABETES MELLITUS WITH MICROALBUMINURIA, WITHOUT LONG-TERM CURRENT USE OF INSULIN (H): Primary | ICD-10-CM

## 2022-04-11 LAB
HBA1C MFR BLD: 9.1 % (ref 0–5.6)
HOLD SPECIMEN: NORMAL
HOLD SPECIMEN: NORMAL

## 2022-04-11 PROCEDURE — 99214 OFFICE O/P EST MOD 30 MIN: CPT | Performed by: PHYSICIAN ASSISTANT

## 2022-04-11 PROCEDURE — 83036 HEMOGLOBIN GLYCOSYLATED A1C: CPT | Performed by: PHYSICIAN ASSISTANT

## 2022-04-11 PROCEDURE — 36415 COLL VENOUS BLD VENIPUNCTURE: CPT | Performed by: PHYSICIAN ASSISTANT

## 2022-04-11 RX ORDER — GLIPIZIDE 10 MG/1
20 TABLET, FILM COATED, EXTENDED RELEASE ORAL DAILY
Qty: 180 TABLET | Refills: 0 | Status: SHIPPED | OUTPATIENT
Start: 2022-04-11 | End: 2022-08-29

## 2022-04-11 ASSESSMENT — PAIN SCALES - GENERAL: PAINLEVEL: NO PAIN (0)

## 2022-04-11 NOTE — PROGRESS NOTES
"Ady Hale is a 41 year old who presents for the following health issues     History of Present Illness       Diabetes:   He presents for follow up of diabetes.  He is checking home blood glucose one time daily. He checks blood glucose before meals.  Blood glucose is sometimes over 200 and never under 70. When his blood glucose is low, the patient is asymptomatic for confusion, blurred vision, lethargy and reports not feeling dizzy, shaky, or weak.  He has no concerns regarding his diabetes at this time.  He is not experiencing numbness or burning in feet, excessive thirst, blurry vision, weight changes or redness, sores or blisters on feet.         He eats 2-3 servings of fruits and vegetables daily.He consumes 1 sweetened beverage(s) daily.He exercises with enough effort to increase his heart rate 10 to 19 minutes per day.  He exercises with enough effort to increase his heart rate 4 days per week.   He is taking medications regularly.       AM numbers running arouond 170.  Not checking sugars during the daytime.     Review of Systems   Constitutional, HEENT, cardiovascular, pulmonary, GI, , musculoskeletal, neuro, skin, endocrine and psych systems are negative, except as otherwise noted.        Objective    /88   Pulse 80   Temp 98.8  F (37.1  C) (Tympanic)   Resp 20   Ht 1.88 m (6' 2\")   Wt (!) 179.4 kg (395 lb 9.6 oz)   SpO2 96%   BMI 50.79 kg/m    Body mass index is 50.79 kg/m .  Physical Exam   Eye exam - right eye normal lid, conjunctiva, cornea, pupil and fundus, left eye normal lid, conjunctiva, cornea, pupil and fundus.  Thyroid not palpable, not enlarged, no nodules detected.  CHEST:chest clear to IPPA, no tachypnea, retractions or cyanosis and S1, S2 normal, no murmur, no gallop, rate regular.  Foot exam - both sides normal; no swelling, tenderness or skin or vascular lesions. Color and temperature is normal. Sensation is intact. Peripheral pulses are palpable. Toenails are " normal.    Geoffrey was seen today for diabetes.    Diagnoses and all orders for this visit:    Type 2 diabetes mellitus with microalbuminuria, without long-term current use of insulin (H)  -     Hemoglobin A1c; Future  -     Hemoglobin A1c  -     dulaglutide (TRULICITY) 1.5 MG/0.5ML pen; Inject 1.5 mg Subcutaneous every 7 days  -     metFORMIN (GLUCOPHAGE) 1000 MG tablet; TAKE 1 TABLET BY MOUTH TWICE DAILY WITH MEALS  -     glipiZIDE (GLUCOTROL XL) 10 MG 24 hr tablet; Take 2 tablets (20 mg) by mouth in the morning.  -     insulin pen needle (31G X 8 MM) 31G X 8 MM miscellaneous; Use 1 pen needles once a week or as directed.    Other orders  -     Extra Tube; Future  -     Extra Tube      work on lifestyle modification  Recheck in 3 months

## 2022-04-11 NOTE — PROGRESS NOTES
14  DAY STM VISIT    Diagnostic AHI: 136.8  PSG    Message left for patient to return call     Assessment: Pt not meeting objective benchmarks for AHI and compliance       Action plan: waiting for patient to return call.       Device type: Auto-CPAP    PAP settings: CPAP min 5.0 cm  H20       CPAP max 20.0 cm  H20      95th% pressure 11.2 cm  H20        RESMED EPR level Setting: TWO    RESMED Soft response setting:  OFF    Mask type:  Nasal Mask    Objective measures: 14 day rolling measures      Compliance  0 %      Leak  5.44  lpm  last  upload      AHI 6.15   last  upload      Average number of minutes 106      Objective measure goal  Compliance   Goal >70%  Leak   Goal < 24 lpm  AHI  Goal < 5  Usage  Goal >240        Total time spent on accessing and interpreting remote patient PAP therapy data  2 minutes    Total time spent counseling, coaching  and reviewing PAP therapy data with patient  1 minutes    [id number]  [id number]  no (3 day STM)

## 2022-05-04 ENCOUNTER — TELEPHONE (OUTPATIENT)
Dept: SLEEP MEDICINE | Facility: CLINIC | Age: 42
End: 2022-05-04
Payer: COMMERCIAL

## 2022-05-25 ASSESSMENT — SLEEP AND FATIGUE QUESTIONNAIRES
HOW LIKELY ARE YOU TO NOD OFF OR FALL ASLEEP WHILE SITTING AND READING: SLIGHT CHANCE OF DOZING
HOW LIKELY ARE YOU TO NOD OFF OR FALL ASLEEP IN A CAR, WHILE STOPPED FOR A FEW MINUTES IN TRAFFIC: WOULD NEVER DOZE
HOW LIKELY ARE YOU TO NOD OFF OR FALL ASLEEP WHILE SITTING AND TALKING TO SOMEONE: WOULD NEVER DOZE
HOW LIKELY ARE YOU TO NOD OFF OR FALL ASLEEP WHILE SITTING QUIETLY AFTER LUNCH WITHOUT ALCOHOL: SLIGHT CHANCE OF DOZING
HOW LIKELY ARE YOU TO NOD OFF OR FALL ASLEEP WHILE LYING DOWN TO REST IN THE AFTERNOON WHEN CIRCUMSTANCES PERMIT: MODERATE CHANCE OF DOZING
HOW LIKELY ARE YOU TO NOD OFF OR FALL ASLEEP WHEN YOU ARE A PASSENGER IN A CAR FOR AN HOUR WITHOUT A BREAK: SLIGHT CHANCE OF DOZING
HOW LIKELY ARE YOU TO NOD OFF OR FALL ASLEEP WHILE SITTING INACTIVE IN A PUBLIC PLACE: MODERATE CHANCE OF DOZING
HOW LIKELY ARE YOU TO NOD OFF OR FALL ASLEEP WHILE WATCHING TV: HIGH CHANCE OF DOZING

## 2022-05-26 ENCOUNTER — VIRTUAL VISIT (OUTPATIENT)
Dept: SLEEP MEDICINE | Facility: CLINIC | Age: 42
End: 2022-05-26
Payer: COMMERCIAL

## 2022-05-26 VITALS — BODY MASS INDEX: 40.43 KG/M2 | HEIGHT: 74 IN | WEIGHT: 315 LBS

## 2022-05-26 DIAGNOSIS — G47.33 OBSTRUCTIVE SLEEP APNEA: Primary | ICD-10-CM

## 2022-05-26 DIAGNOSIS — G47.10 HYPERSOMNIA: ICD-10-CM

## 2022-05-26 PROCEDURE — 99214 OFFICE O/P EST MOD 30 MIN: CPT | Mod: GT | Performed by: INTERNAL MEDICINE

## 2022-05-26 ASSESSMENT — PAIN SCALES - GENERAL: PAINLEVEL: NO PAIN (0)

## 2022-05-26 NOTE — PATIENT INSTRUCTIONS
Pressure Desensitization Protocol    1.  Put the mask on your face without putting the straps on while doing an activity that you enjoy such as watching TV, listening to the radio, surfing the Internet, or reading.  Try to do this for 15 minutes a day for one week.    2.  Put the mask on your face with the straps on while doing activity that you enjoy.  Try to do this for 15 minutes a day for another week.    3.  Put the mask on and attach the hose to the machine and turn on the machine.  Try to focus on activities that you enjoy for 15 minutes.  Perform this activity for one week.    4.  The mask on and attach the hose in the machine while doing an activity as you enjoy for 30 minutes.  Try this for one week.    5.  Repeat step 4.  Until you can increase the hours of usage to 2 hours.    May go back to previous steps if there is any discomfort at any time.  Also try to sleep with the machine every night for as long as you can tolerate it.

## 2022-05-26 NOTE — PROGRESS NOTES
Geoffrey is a 41 year old who is being evaluated via a billable video visit.      How would you like to obtain your AVS? MyChart  If the video visit is dropped, the invitation should be resent by: Text to cell phone: [phone]  Will anyone else be joining your video visit? Madeline Cartwright      Video Start Time: 7:54 AM  Video-Visit Details    Type of service:  Video Visit    Video End Time:8:01 AM    Originating Location (pt. Location): Home    Distant Location (provider location):  [facility] [place]     Platform used for Video Visit: [employer]     Additional 10 minutes on the date of service was spent performing the following:    -Preparing to see the patient  -Ordering medications, tests, or procedures   -Documenting clinical information in the electronic or other health record     Thank you for the opportunity to participate in the care of Geoffrey Wilson.     He is a 41 year old y/o male patient who comes to the sleep medicine clinic for follow up.  The patient was diagnosed with RILEY on 02/20/22 (VWQ=356.8). The patient states that he is trying to use his CPAP machine but noticed that he would rip off his mask in his sleep when he wakes up in the morning.     Assessment and Plan:  In summary Geoffrey Wilson is a 41 year old year old male who is here for follow up.    1. Obstructive sleep apnea  I recommend the patient initiate pressure desensitization protocol. I will also narrow his pressure range to 10-15 cwp. I welcomed him to follow up with me in 3 months.  - COMPREHENSIVE DME    2. Hypersomnia  - COMPREHENSIVE DME     Compliance Download data for 30 Days:  Pressure setting:APAP 5-20 cwp  95% pressure:10.9 cwp  Residual AHI:7.7 events per hour  Leak:Minimal  Compliance:0%  Mask Tolerance:Good  Skin irritation:None  DME:[facility].    Lab reviewed: Discussed with patient.    Sleep-Wake Cycle:    The patient likes to initiate sleep at around 10-10:30 PM with a sleep latency of 1  hour. The patient has 5 nocturnal awakenings. Final wake up time is around 6 AM.    RICARDA:  RICARDA Total Score: 10  Total score - [place]: 10 (5/25/2022 11:21 AM)        Patient Active Problem List   Diagnosis     Morbid obesity due to excess calories (H)     HTN (hypertension)     Hyperlipidemia LDL goal <100     Type 2 diabetes mellitus with microalbuminuria, without long-term current use of insulin (H)     Chronic kidney disease, stage 2 (mild)       Past Medical History:   Diagnosis Date     Cellulitis-scrotum 8/15/2016     SIRS (systemic inflammatory response syndrome) (H) 8/16/2016       Past Surgical History:   Procedure Laterality Date     HC REPAIR ING HERNIA,5+Y/O,REDUCIBL  Age 6       Social History     Socioeconomic History     Marital status:      Spouse name: Not on file     Number of children: Not on file     Years of education: Not on file     Highest education level: Not on file   Occupational History     Occupation: Optometric Assistant   Tobacco Use     Smoking status: Never Smoker     Smokeless tobacco: Never Used   Vaping Use     Vaping Use: Never used   Substance and Sexual Activity     Alcohol use: Yes     Alcohol/week: 0.0 standard drinks     Comment: 2 times/ month     Drug use: No     Sexual activity: Never   Other Topics Concern     Parent/sibling w/ CABG, MI or angioplasty before 65F 55M? Not Asked   Social History Narrative     Not on file     Social Determinants of Health     Financial Resource Strain: Not on file   Food Insecurity: Not on file   Transportation Needs: Not on file   Physical Activity: Not on file   Stress: Not on file   Social Connections: Not on file   Intimate Partner Violence: Not on file   Housing Stability: Not on file       Current Outpatient Medications   Medication Sig Dispense Refill     amLODIPine-valsartan (EXFORGE) 5-160 MG tablet TAKE 1 TABLET BY MOUTH ONCE DAILY 90 tablet 1     Continuous Blood Gluc  (FREESTYLE MARYJO READER) POLI 1 Device daily 1  each 0     Continuous Blood Gluc Sensor (FREESTYLE MARYJO 14 DAY SENSOR) MISC Change every 14 days. 2 each 11     Continuous Blood Gluc Sensor (FREESTYLE MARYJO 14 DAY SENSOR) MISC Change every 14 days. 2 each 11     dulaglutide (TRULICITY) 1.5 MG/0.5ML pen Inject 1.5 mg Subcutaneous every 7 days 6 mL 0     glipiZIDE (GLUCOTROL XL) 10 MG 24 hr tablet Take 2 tablets (20 mg) by mouth in the morning. 180 tablet 0     insulin pen needle (31G X 8 MM) 31G X 8 MM miscellaneous Use 1 pen needles once a week or as directed. 100 each 3     metFORMIN (GLUCOPHAGE) 1000 MG tablet TAKE 1 TABLET BY MOUTH TWICE DAILY WITH MEALS 180 tablet 0     OneTouch Delica Lancets 33G MISC 1 each 2 times daily 100 each 3     ONETOUCH VERIO IQ test strip Use to test blood sugar 1-2 times daily or as directed. 50 strip 3     rosuvastatin (CRESTOR) 20 MG tablet Take 1 tablet (20 mg) by mouth daily 90 tablet 3     sildenafil (REVATIO) 20 MG tablet 2-5 tabs daily prn 30 tablet 11     terbinafine (LAMISIL) 1 % external cream CHARLINE TOPICALLY BID FOR FUNGAL INFECTION NOT RESOLVED WITH OTHER ANTIFUNGALS  1       Allergies   Allergen Reactions     Lisinopril Cough     Penicillins Rash       Physical Exam:  GEN: NAD,  Psych: normal mood, normal affect    Labs/Studies:      No results found for: PH, PHARTERIAL, PO2, [id number], SAT, PCO2, HCO3, BASEEXCESS, ROMAN, BEB  Lab Results   Component Value Date    TSH 2.38 02/04/2019    TSH 1.97 05/11/2018     Lab Results   Component Value Date     (H) 09/07/2021     (H) 08/24/2020     Lab Results   Component Value Date    HGB 15.3 02/18/2022    HGB 11.9 (L) 08/24/2016     Lab Results   Component Value Date    BUN 15 09/07/2021    BUN 23 08/24/2020    CR 0.89 09/07/2021    CR 1.02 08/24/2020     No results found for: AST, ALT, GGT, ALKPHOS, BILITOTAL, BILICONJ, BILIDIRECT, SHINE  No results found for: UAMP, UBARB, BENZODIAZEUR, UCANN, UCOC, OPIT, UPCP    Recent Labs   Lab Test 02/18/22  1615 09/07/21  0872  08/24/20  1048    138 141   POTASSIUM 4.4 4.4 4.2   CHLORIDE  --  109 109   CO2  --  24 27   ANIONGAP  --  5 5   GLC  --  186* 134*   BUN  --  15 23   CR  --  0.89 1.02   COURTNEY  --  9.1 8.9       No results found for: KARI    I reviewed the efficacy and compliance report from his device. Data summarized on the HPI and the PAP compliance flow sheet.     Patient verbalized understanding of these issues, agrees with the plan and all questions were answered today. Patient was given an opportuntity to voice any other symptoms or concerns not listed above. Patient did not have any other symptoms or concerns.      Rodri Lan DO  Board Certified in Internal Medicine and Sleep Medicine    (Note created with Dragon voice recognition and unintended spelling errors and word substitutions may occur)     Audio and visual devices were used for this virtual clinic visit with permission from patient.

## 2022-06-03 ENCOUNTER — TELEPHONE (OUTPATIENT)
Dept: SLEEP MEDICINE | Facility: CLINIC | Age: 42
End: 2022-06-03
Payer: COMMERCIAL

## 2022-06-03 NOTE — TELEPHONE ENCOUNTER
Called patient to discuss CPAP usage, left a message to return call to me at  [phone]    Catalina Ayala DME Coordinator

## 2022-07-08 DIAGNOSIS — R03.0 ELEVATED BLOOD PRESSURE READING WITHOUT DIAGNOSIS OF HYPERTENSION: ICD-10-CM

## 2022-07-09 RX ORDER — AMLODIPINE AND VALSARTAN 5; 160 MG/1; MG/1
TABLET ORAL
Qty: 90 TABLET | Refills: 0 | Status: SHIPPED | OUTPATIENT
Start: 2022-07-09 | End: 2022-08-29

## 2022-07-09 NOTE — TELEPHONE ENCOUNTER
"Prescription approved per [facility] Refill Protocol.  Requested Prescriptions   Pending Prescriptions Disp Refills     amLODIPine-valsartan (EXFORGE) 5-160 MG tablet [Pharmacy Med Name: amLODIPine Besylate-Valsartan 5-160 MG Oral Tablet] 90 tablet 0     Sig: Take 1 tablet by mouth once daily       Angiotensin-II Receptors Passed - 7/8/2022  6:41 AM        Passed - Last blood pressure under 140/90 in past 12 months     BP Readings from Last 3 Encounters:   04/11/22 133/88   01/11/22 129/82   09/07/21 (!) 137/96                 Passed - Recent (12 mo) or future (30 days) visit within the authorizing provider's specialty     Patient has had an office visit with the authorizing provider or a provider within the authorizing providers department within the previous 12 mos or has a future within next 30 days. See \"Patient Info\" tab in inbasket, or \"Choose Columns\" in Meds & Orders section of the refill encounter.              Passed - Medication is active on med list        Passed - Patient is age 18 or older        Passed - Normal serum creatinine on file in past 12 months     Recent Labs   Lab Test 09/07/21  0825   CR 0.89       Ok to refill medication if creatinine is low          Passed - Normal serum potassium on file in past 12 months     Recent Labs   Lab Test 02/18/22  1615   POTASSIUM 4.4                   Calcium Channel Blockers Protocol  Passed - 7/8/2022  6:41 AM        Passed - Blood pressure under 140/90 in past 12 months     BP Readings from Last 3 Encounters:   04/11/22 133/88   01/11/22 129/82   09/07/21 (!) 137/96                 Passed - Recent (12 mo) or future (30 days) visit within the authorizing provider's specialty     Patient has had an office visit with the authorizing provider or a provider within the authorizing providers department within the previous 12 mos or has a future within next 30 days. See \"Patient Info\" tab in inbasket, or \"Choose Columns\" in Meds & Orders section of the refill " encounter.              Passed - Medication is active on med list        Passed - Patient is age 18 or older        Passed - Normal serum creatinine on file in past 12 months     Recent Labs   Lab Test 09/07/21  0825   CR 0.89       Ok to refill medication if creatinine is low

## 2022-08-22 NOTE — PROGRESS NOTES
Ady Hale is a 42 year old, presenting for the following health issues:  Diabetes (recheck), Hypertension (recheck), and Health Maintenance (Patient declines pneumonia vaccine)      History of Present Illness       Diabetes:   He presents for follow up of diabetes.  He is checking home blood glucose a few times a month. He checks blood glucose before meals.  Blood glucose is sometimes over 200 and never under 70. When his blood glucose is low, the patient is asymptomatic for confusion, blurred vision, lethargy and reports not feeling dizzy, shaky, or weak.  He has no concerns regarding his diabetes at this time.  He is not experiencing numbness or burning in feet, excessive thirst, blurry vision, weight changes or redness, sores or blisters on feet. The patient has not had a diabetic eye exam in the last 12 months.         He eats 2-3 servings of fruits and vegetables daily.He consumes 1 sweetened beverage(s) daily.He exercises with enough effort to increase his heart rate 20 to 29 minutes per day.  He exercises with enough effort to increase his heart rate 5 days per week.   He is taking medications regularly.       No chest pain/sob/palpitations/dizziness/ha's  No polyuria/dipsia.  No profound fatigue.   More active.   Used trulicity for 3 mos . Then it became too expensive.   Review of Systems   Constitutional, HEENT, cardiovascular, pulmonary, GI, , musculoskeletal, neuro, skin, endocrine and psych systems are negative, except as otherwise noted.      Objective    /84   Pulse 97   Temp 98.2  F (36.8  C) (Tympanic)   Resp 24   Wt (!) 178.8 kg (394 lb 3.2 oz)   SpO2 94%   BMI 50.61 kg/m    Body mass index is 50.61 kg/m .  Physical Exam     Eye exam - right eye normal lid, conjunctiva, cornea, pupil and fundus, left eye normal lid, conjunctiva, cornea, pupil and fundus.  Thyroid not palpable, not enlarged, no nodules detected.  CHEST:chest clear to IPPA, no tachypnea, retractions or  cyanosis and S1, S2 normal, no murmur, no gallop, rate regular.  Foot exam - both sides normal; no swelling, tenderness or skin or vascular lesions. Color and temperature is normal. Sensation is intact. Peripheral pulses are palpable. Toenails are normal.    Geoffrey was seen today for diabetes, hypertension and health maintenance.    Diagnoses and all orders for this visit:    Type 2 diabetes mellitus with microalbuminuria, without long-term current use of insulin (H)  -     BASIC METABOLIC PANEL; Future  -     Albumin Random Urine Quantitative with Creat Ratio; Future  -     semaglutide (OZEMPIC) 2 MG/1.5ML SOPN pen; Inject 0.25 mg Subcutaneous every 7 days  -     Hemoglobin A1c; Future  -     Hemoglobin A1c  -     Albumin Random Urine Quantitative with Creat Ratio  -     BASIC METABOLIC PANEL  -     empagliflozin (JARDIANCE) 10 MG TABS tablet; Take 1 tablet (10 mg) by mouth daily    Elevated blood pressure reading without diagnosis of hypertension  -     BASIC METABOLIC PANEL; Future  -     BASIC METABOLIC PANEL    Class 3 severe obesity due to excess calories with serious comorbidity and body mass index (BMI) of 45.0 to 49.9 in adult (H)  -     semaglutide (OZEMPIC) 2 MG/1.5ML SOPN pen; Inject 0.25 mg Subcutaneous every 7 days  -     Comprehensive Weight Management; Future    Hyperlipidemia LDL goal <100  -     Lipid panel reflex to direct LDL Fasting; Future  -     Lipid panel reflex to direct LDL Fasting    Other orders  -     REVIEW OF HEALTH MAINTENANCE PROTOCOL ORDERS      Start ozempic and jardiance.  work on lifestyle modification  Recheck in 3-4 mos.   .  ..

## 2022-08-23 ENCOUNTER — OFFICE VISIT (OUTPATIENT)
Dept: FAMILY MEDICINE | Facility: CLINIC | Age: 42
End: 2022-08-23
Payer: COMMERCIAL

## 2022-08-23 VITALS
TEMPERATURE: 98.2 F | BODY MASS INDEX: 50.61 KG/M2 | SYSTOLIC BLOOD PRESSURE: 124 MMHG | HEART RATE: 97 BPM | DIASTOLIC BLOOD PRESSURE: 84 MMHG | RESPIRATION RATE: 24 BRPM | WEIGHT: 315 LBS | OXYGEN SATURATION: 94 %

## 2022-08-23 DIAGNOSIS — E11.29 TYPE 2 DIABETES MELLITUS WITH MICROALBUMINURIA, WITHOUT LONG-TERM CURRENT USE OF INSULIN (H): Primary | ICD-10-CM

## 2022-08-23 DIAGNOSIS — R03.0 ELEVATED BLOOD PRESSURE READING WITHOUT DIAGNOSIS OF HYPERTENSION: ICD-10-CM

## 2022-08-23 DIAGNOSIS — E78.5 HYPERLIPIDEMIA LDL GOAL <100: ICD-10-CM

## 2022-08-23 DIAGNOSIS — E66.813 CLASS 3 SEVERE OBESITY DUE TO EXCESS CALORIES WITH SERIOUS COMORBIDITY AND BODY MASS INDEX (BMI) OF 45.0 TO 49.9 IN ADULT (H): ICD-10-CM

## 2022-08-23 DIAGNOSIS — E66.01 CLASS 3 SEVERE OBESITY DUE TO EXCESS CALORIES WITH SERIOUS COMORBIDITY AND BODY MASS INDEX (BMI) OF 45.0 TO 49.9 IN ADULT (H): ICD-10-CM

## 2022-08-23 DIAGNOSIS — R80.9 TYPE 2 DIABETES MELLITUS WITH MICROALBUMINURIA, WITHOUT LONG-TERM CURRENT USE OF INSULIN (H): Primary | ICD-10-CM

## 2022-08-23 LAB
ANION GAP SERPL CALCULATED.3IONS-SCNC: 5 MMOL/L (ref 3–14)
BUN SERPL-MCNC: 14 MG/DL (ref 7–30)
CALCIUM SERPL-MCNC: 9.1 MG/DL (ref 8.5–10.1)
CHLORIDE BLD-SCNC: 106 MMOL/L (ref 94–109)
CHOLEST SERPL-MCNC: 117 MG/DL
CO2 SERPL-SCNC: 27 MMOL/L (ref 20–32)
CREAT SERPL-MCNC: 0.82 MG/DL (ref 0.66–1.25)
CREAT UR-MCNC: 311 MG/DL
FASTING STATUS PATIENT QL REPORTED: YES
GFR SERPL CREATININE-BSD FRML MDRD: >90 ML/MIN/1.73M2
GLUCOSE BLD-MCNC: 232 MG/DL (ref 70–99)
HBA1C MFR BLD: 9.5 % (ref 0–5.6)
HDLC SERPL-MCNC: 34 MG/DL
LDLC SERPL CALC-MCNC: 50 MG/DL
MICROALBUMIN UR-MCNC: 313 MG/L
MICROALBUMIN/CREAT UR: 100.64 MG/G CR (ref 0–17)
NONHDLC SERPL-MCNC: 83 MG/DL
POTASSIUM BLD-SCNC: 4.2 MMOL/L (ref 3.4–5.3)
SODIUM SERPL-SCNC: 138 MMOL/L (ref 133–144)
TRIGL SERPL-MCNC: 164 MG/DL

## 2022-08-23 PROCEDURE — 80048 BASIC METABOLIC PNL TOTAL CA: CPT | Performed by: PHYSICIAN ASSISTANT

## 2022-08-23 PROCEDURE — 80061 LIPID PANEL: CPT | Performed by: PHYSICIAN ASSISTANT

## 2022-08-23 PROCEDURE — 99214 OFFICE O/P EST MOD 30 MIN: CPT | Performed by: PHYSICIAN ASSISTANT

## 2022-08-23 PROCEDURE — 83036 HEMOGLOBIN GLYCOSYLATED A1C: CPT | Performed by: PHYSICIAN ASSISTANT

## 2022-08-23 PROCEDURE — 82043 UR ALBUMIN QUANTITATIVE: CPT | Performed by: PHYSICIAN ASSISTANT

## 2022-08-23 PROCEDURE — 36415 COLL VENOUS BLD VENIPUNCTURE: CPT | Performed by: PHYSICIAN ASSISTANT

## 2022-08-23 ASSESSMENT — PAIN SCALES - GENERAL: PAINLEVEL: NO PAIN (0)

## 2022-08-29 DIAGNOSIS — E11.29 TYPE 2 DIABETES MELLITUS WITH MICROALBUMINURIA, WITHOUT LONG-TERM CURRENT USE OF INSULIN (H): ICD-10-CM

## 2022-08-29 DIAGNOSIS — E78.5 HYPERLIPIDEMIA LDL GOAL <100: ICD-10-CM

## 2022-08-29 DIAGNOSIS — R03.0 ELEVATED BLOOD PRESSURE READING WITHOUT DIAGNOSIS OF HYPERTENSION: ICD-10-CM

## 2022-08-29 DIAGNOSIS — R80.9 TYPE 2 DIABETES MELLITUS WITH MICROALBUMINURIA, WITHOUT LONG-TERM CURRENT USE OF INSULIN (H): ICD-10-CM

## 2022-08-29 RX ORDER — AMLODIPINE AND VALSARTAN 5; 160 MG/1; MG/1
1 TABLET ORAL DAILY
Qty: 90 TABLET | Refills: 3 | Status: SHIPPED | OUTPATIENT
Start: 2022-08-29 | End: 2023-09-11

## 2022-08-29 RX ORDER — ROSUVASTATIN CALCIUM 20 MG/1
20 TABLET, COATED ORAL DAILY
Qty: 90 TABLET | Refills: 3 | Status: SHIPPED | OUTPATIENT
Start: 2022-08-29 | End: 2023-09-01

## 2022-08-29 RX ORDER — GLIPIZIDE 10 MG/1
20 TABLET, FILM COATED, EXTENDED RELEASE ORAL DAILY
Qty: 180 TABLET | Refills: 0 | Status: SHIPPED | OUTPATIENT
Start: 2022-08-29 | End: 2022-11-24

## 2022-08-29 NOTE — TELEPHONE ENCOUNTER
Patient requesting refills of Metformin, glipizide (is out of this medication), amlodipine, and rosuvastin.  Patient states that pharmacy ([facility]) has notified us of these, but I do not see this request.    Needs asap.  Please call when done, or if you have any questions.  OK to LM on VM    Please send to:  [facility] - [facility], [address]

## 2022-08-29 NOTE — TELEPHONE ENCOUNTER
Prescription approved per [facility] Refill Protocol.    Please notify patient.     Jania Ramirez, RN, BSN

## 2022-08-31 ENCOUNTER — TRANSFERRED RECORDS (OUTPATIENT)
Dept: HEALTH INFORMATION MANAGEMENT | Facility: CLINIC | Age: 42
End: 2022-08-31

## 2022-08-31 LAB — RETINOPATHY: NORMAL

## 2022-10-09 ENCOUNTER — HEALTH MAINTENANCE LETTER (OUTPATIENT)
Age: 42
End: 2022-10-09

## 2022-10-27 ENCOUNTER — DOCUMENTATION ONLY (OUTPATIENT)
Dept: SLEEP MEDICINE | Facility: CLINIC | Age: 42
End: 2022-10-27

## 2022-10-27 NOTE — PROGRESS NOTES
10/27/2022- JL - PT IS NOT COMPLIANT WITH CPAP USAGE, 4/25/2022- 5- 0% COMPLIANT. I LEFT  RE: HOW TO RETURN MACHINE TO [facility]   Lab Facility: 3

## 2022-11-05 ENCOUNTER — DOCUMENTATION ONLY (OUTPATIENT)
Dept: SLEEP MEDICINE | Facility: CLINIC | Age: 42
End: 2022-11-05

## 2022-11-08 ENCOUNTER — DOCUMENTATION ONLY (OUTPATIENT)
Dept: SLEEP MEDICINE | Facility: CLINIC | Age: 42
End: 2022-11-08

## 2022-11-08 NOTE — PROGRESS NOTES
3RD ATTEMPT: 11/8/2022- LEFT VM ON HOW TO RETURN CPAP DUE TO NON COMPLIANCE. SENT TO CERTIFIED LETTERS

## 2022-12-23 ENCOUNTER — VIRTUAL VISIT (OUTPATIENT)
Dept: FAMILY MEDICINE | Facility: CLINIC | Age: 42
End: 2022-12-23
Payer: COMMERCIAL

## 2022-12-23 DIAGNOSIS — E66.813 CLASS 3 SEVERE OBESITY DUE TO EXCESS CALORIES WITH SERIOUS COMORBIDITY AND BODY MASS INDEX (BMI) OF 45.0 TO 49.9 IN ADULT (H): ICD-10-CM

## 2022-12-23 DIAGNOSIS — E11.29 TYPE 2 DIABETES MELLITUS WITH MICROALBUMINURIA, WITHOUT LONG-TERM CURRENT USE OF INSULIN (H): ICD-10-CM

## 2022-12-23 DIAGNOSIS — E66.01 CLASS 3 SEVERE OBESITY DUE TO EXCESS CALORIES WITH SERIOUS COMORBIDITY AND BODY MASS INDEX (BMI) OF 45.0 TO 49.9 IN ADULT (H): ICD-10-CM

## 2022-12-23 DIAGNOSIS — R80.9 TYPE 2 DIABETES MELLITUS WITH MICROALBUMINURIA, WITHOUT LONG-TERM CURRENT USE OF INSULIN (H): ICD-10-CM

## 2022-12-23 PROCEDURE — 99214 OFFICE O/P EST MOD 30 MIN: CPT | Mod: TEL | Performed by: PHYSICIAN ASSISTANT

## 2022-12-23 RX ORDER — GLIPIZIDE 10 MG/1
20 TABLET, FILM COATED, EXTENDED RELEASE ORAL DAILY
Qty: 180 TABLET | Refills: 1 | Status: SHIPPED | OUTPATIENT
Start: 2022-12-23 | End: 2023-04-12

## 2022-12-23 NOTE — PROGRESS NOTES
Geoffrey is a 42 year old who is being evaluated via a billable telephone visit.      What phone number would you like to be contacted at? [phone]  How would you like to obtain your AVS? Pari    Distant Location (provider location):  Off-site        Subjective   Geoffrey is a 42 year old presenting for the following health issues:  Diabetes and Hypertension      History of Present Illness       Diabetes:   He presents for follow up of diabetes.  He is checking home blood glucose a few times a month. He checks blood glucose before meals.  Blood glucose is sometimes over 200 and never under 70. When his blood glucose is low, the patient is asymptomatic for confusion, blurred vision, lethargy and reports not feeling dizzy, shaky, or weak.  He is concerned about blood sugar frequently over 200.  He is not experiencing numbness or burning in feet, excessive thirst, blurry vision, weight changes or redness, sores or blisters on feet.         He eats 2-3 servings of fruits and vegetables daily.He consumes 1 sweetened beverage(s) daily.He exercises with enough effort to increase his heart rate 10 to 19 minutes per day.  He exercises with enough effort to increase his heart rate 5 days per week.   He is taking medications regularly.     Taking and tolerating ozempic and jardiance.   Sugars: AM in the lower 100's.   No polyuria/dipsia.  No chest pain/sob/palpitations/dizziness/ha's        Review of Systems   Constitutional, HEENT, cardiovascular, pulmonary, GI, , musculoskeletal, neuro, skin, endocrine and psych systems are negative, except as otherwise noted.      Objective           Vitals:  No vitals were obtained today due to virtual visit.    Physical Exam   healthy, alert and no distress  PSYCH: Alert and oriented times 3; coherent speech, normal   rate and volume, able to articulate logical thoughts, able   to abstract reason, no tangential thoughts, no hallucinations   or delusions  His affect is normal  RESP: No  cough, no audible wheezing, able to talk in full sentences  Remainder of exam unable to be completed due to telephone visits    Geoffrey was seen today for diabetes and hypertension.    Diagnoses and all orders for this visit:    Type 2 diabetes mellitus with microalbuminuria, without long-term current use of insulin (H)  -     Hemoglobin A1c; Future  -     empagliflozin (JARDIANCE) 25 MG TABS tablet; Take 1 tablet (25 mg) by mouth daily  -     semaglutide (OZEMPIC) 2 MG/1.5ML SOPN pen; Inject 0.5 mg Subcutaneous every 7 days  -     metFORMIN (GLUCOPHAGE) 1000 MG tablet; TAKE 1 TABLET TWICE A DAY WITH MEALS  -     glipiZIDE (GLUCOTROL XL) 10 MG 24 hr tablet; Take 2 tablets (20 mg) by mouth daily    Class 3 severe obesity due to excess calories with serious comorbidity and body mass index (BMI) of 45.0 to 49.9 in adult (H)  -     semaglutide (OZEMPIC) 2 MG/1.5ML SOPN pen; Inject 0.5 mg Subcutaneous every 7 days      Will increase jardiance dose to 25 mg and also ozempic to 0.5 mg weekly .   work on lifestyle modification  Check an a1c in 1 mos.          Phone call duration: 9 minutes

## 2023-02-12 ENCOUNTER — HEALTH MAINTENANCE LETTER (OUTPATIENT)
Age: 43
End: 2023-02-12

## 2023-03-25 ENCOUNTER — HEALTH MAINTENANCE LETTER (OUTPATIENT)
Age: 43
End: 2023-03-25

## 2023-04-12 ENCOUNTER — OFFICE VISIT (OUTPATIENT)
Dept: FAMILY MEDICINE | Facility: CLINIC | Age: 43
End: 2023-04-12
Payer: COMMERCIAL

## 2023-04-12 VITALS
TEMPERATURE: 98 F | WEIGHT: 315 LBS | BODY MASS INDEX: 41.75 KG/M2 | DIASTOLIC BLOOD PRESSURE: 76 MMHG | RESPIRATION RATE: 20 BRPM | OXYGEN SATURATION: 95 % | HEIGHT: 73 IN | HEART RATE: 113 BPM | SYSTOLIC BLOOD PRESSURE: 124 MMHG

## 2023-04-12 DIAGNOSIS — E66.813 CLASS 3 SEVERE OBESITY DUE TO EXCESS CALORIES WITH SERIOUS COMORBIDITY AND BODY MASS INDEX (BMI) OF 45.0 TO 49.9 IN ADULT (H): ICD-10-CM

## 2023-04-12 DIAGNOSIS — E66.01 CLASS 3 SEVERE OBESITY DUE TO EXCESS CALORIES WITH SERIOUS COMORBIDITY AND BODY MASS INDEX (BMI) OF 45.0 TO 49.9 IN ADULT (H): ICD-10-CM

## 2023-04-12 DIAGNOSIS — E11.29 TYPE 2 DIABETES MELLITUS WITH MICROALBUMINURIA, WITHOUT LONG-TERM CURRENT USE OF INSULIN (H): ICD-10-CM

## 2023-04-12 DIAGNOSIS — R80.9 TYPE 2 DIABETES MELLITUS WITH MICROALBUMINURIA, WITHOUT LONG-TERM CURRENT USE OF INSULIN (H): ICD-10-CM

## 2023-04-12 LAB — HBA1C MFR BLD: 7.4 % (ref 0–5.6)

## 2023-04-12 PROCEDURE — 99213 OFFICE O/P EST LOW 20 MIN: CPT | Performed by: PHYSICIAN ASSISTANT

## 2023-04-12 PROCEDURE — 83036 HEMOGLOBIN GLYCOSYLATED A1C: CPT | Performed by: PHYSICIAN ASSISTANT

## 2023-04-12 PROCEDURE — 36415 COLL VENOUS BLD VENIPUNCTURE: CPT | Performed by: PHYSICIAN ASSISTANT

## 2023-04-12 PROCEDURE — 99207 PR FOOT EXAM NO CHARGE: CPT | Performed by: PHYSICIAN ASSISTANT

## 2023-04-12 RX ORDER — GLIPIZIDE 10 MG/1
20 TABLET, FILM COATED, EXTENDED RELEASE ORAL DAILY
Qty: 180 TABLET | Refills: 1 | Status: SHIPPED | OUTPATIENT
Start: 2023-04-12 | End: 2024-01-02

## 2023-04-12 ASSESSMENT — PAIN SCALES - GENERAL: PAINLEVEL: NO PAIN (0)

## 2023-04-12 NOTE — PROGRESS NOTES
"      Ady Hale is a 42 year old, presenting for the following health issues:  Diabetes and Health Maintenance (Patient is fasting, Patient declines Pneumonia vaccine)        4/12/2023     7:45 AM   Additional Questions   Roomed by Ange Cunningham CMA   Accompanied by None         4/12/2023     7:45 AM   Patient Reported Additional Medications   Patient reports taking the following new medications None     History of Present Illness       Diabetes:   He presents for follow up of diabetes.  He is not checking blood glucose. He has no concerns regarding his diabetes at this time.  He is not experiencing numbness or burning in feet, excessive thirst, blurry vision, weight changes or redness, sores or blisters on feet.     He consumes 1 sweetened beverage(s) daily.He exercises with enough effort to increase his heart rate 10 to 19 minutes per day.  He exercises with enough effort to increase his heart rate 5 days per week.   He is taking medications regularly.       Not checking sugars.  No chest pain/sob/palpitations/dizziness/ha's  Recheck of obesity. Discussed a lower calorie diet and consistent mix of aerobic exercise and strength training. This will help maintain/treat his blood pressure/sugars  Walking more.         Review of Systems   Constitutional, HEENT, cardiovascular, pulmonary, GI, , musculoskeletal, neuro, skin, endocrine and psych systems are negative, except as otherwise noted.    Diabetic foot exam: normal DP and PT pulses, no trophic changes or ulcerative lesions, normal sensory exam and normal monofilament exam      Objective    /76   Pulse 113   Temp 98  F (36.7  C) (Temporal)   Resp 20   Ht 1.854 m (6' 1\")   Wt (!) 172.5 kg (380 lb 6.4 oz)   SpO2 95%   BMI 50.19 kg/m    Body mass index is 50.19 kg/m .  Physical Exam   Eye exam - right eye normal lid, conjunctiva, cornea, pupil and fundus, left eye normal lid, conjunctiva, cornea, pupil and fundus.  Thyroid not palpable, not " enlarged, no nodules detected.  CHEST:chest clear to IPPA, no tachypnea, retractions or cyanosis and S1, S2 normal, no murmur, no gallop, rate regular.  Foot exam - both sides normal; no swelling, tenderness or skin or vascular lesions. Color and temperature is normal. Sensation is intact. Peripheral pulses are palpable. Toenails are normal.    Geoffrey was seen today for diabetes and health maintenance.    Diagnoses and all orders for this visit:    Type 2 diabetes mellitus with microalbuminuria, without long-term current use of insulin (H)  -     FOOT EXAM  -     Cancel: Hemoglobin A1c  -     Hemoglobin A1c; Future  -     empagliflozin (JARDIANCE) 25 MG TABS tablet; Take 1 tablet (25 mg) by mouth daily  -     metFORMIN (GLUCOPHAGE) 1000 MG tablet; TAKE 1 TABLET TWICE A DAY WITH MEALS  -     glipiZIDE (GLUCOTROL XL) 10 MG 24 hr tablet; Take 2 tablets (20 mg) by mouth daily  -     Hemoglobin A1c  -     Semaglutide, 1 MG/DOSE, (OZEMPIC) 4 MG/3ML pen; Inject 1 mg Subcutaneous every 7 days    Class 3 severe obesity due to excess calories with serious comorbidity and body mass index (BMI) of 45.0 to 49.9 in adult (H)      a1c looking better at 7.4. will inc ozempic to 1 mg per week.  Exercise.  Lower sugar/carb diet.  Recheck. In 3 mos.

## 2023-06-02 ENCOUNTER — TELEPHONE (OUTPATIENT)
Dept: FAMILY MEDICINE | Facility: CLINIC | Age: 43
End: 2023-06-02
Payer: COMMERCIAL

## 2023-06-02 DIAGNOSIS — R80.9 TYPE 2 DIABETES MELLITUS WITH MICROALBUMINURIA, WITHOUT LONG-TERM CURRENT USE OF INSULIN (H): ICD-10-CM

## 2023-06-02 DIAGNOSIS — E11.29 TYPE 2 DIABETES MELLITUS WITH MICROALBUMINURIA, WITHOUT LONG-TERM CURRENT USE OF INSULIN (H): ICD-10-CM

## 2023-06-02 NOTE — TELEPHONE ENCOUNTER
Reason for Call:  Other prescription    Detailed comments:  Patient wanting to up dose of  OZEMPIC from 1 mg to 2 mg.  Any questions please call patient.  Please send to new pharmacy [facility] [phone]    Phone Number Patient can be reached at: Home number on file [phone] (home)    Best Time:  Any     Can we leave a detailed message on this number? YES    Call taken on 6/2/2023 at 1:54 PM by Jina Milian

## 2023-06-05 NOTE — TELEPHONE ENCOUNTER
Pt calling to state that pcp told him to call in 1 month with an update, pt stated that he has no more left and would like a refill with a higher dosage of OZEMPIC.    Routing to pcp (David) to advise.    Isabel Mijares RN

## 2023-06-05 NOTE — TELEPHONE ENCOUNTER
Left message on voice mail for patient to call clinic.   [phone]    Last office visit with David Deutsch on 4/12/23 states:   Recheck. In 3 mos.     Please assist patient in scheduling this Diabetic Recheck around July 12.     Please confirm how many doses of Ozempic patient has left and updated pharmacy (last time it was sent to [facility]).     Jessica Munoz RN BSN  [facility]

## 2023-07-12 ENCOUNTER — OFFICE VISIT (OUTPATIENT)
Dept: FAMILY MEDICINE | Facility: CLINIC | Age: 43
End: 2023-07-12
Payer: COMMERCIAL

## 2023-07-12 VITALS
HEIGHT: 73 IN | DIASTOLIC BLOOD PRESSURE: 78 MMHG | OXYGEN SATURATION: 97 % | BODY MASS INDEX: 41.75 KG/M2 | WEIGHT: 315 LBS | TEMPERATURE: 97.6 F | HEART RATE: 95 BPM | SYSTOLIC BLOOD PRESSURE: 132 MMHG | RESPIRATION RATE: 24 BRPM

## 2023-07-12 DIAGNOSIS — B36.0 TINEA VERSICOLOR: ICD-10-CM

## 2023-07-12 DIAGNOSIS — E11.29 TYPE 2 DIABETES MELLITUS WITH MICROALBUMINURIA, WITHOUT LONG-TERM CURRENT USE OF INSULIN (H): Primary | ICD-10-CM

## 2023-07-12 DIAGNOSIS — R80.9 TYPE 2 DIABETES MELLITUS WITH MICROALBUMINURIA, WITHOUT LONG-TERM CURRENT USE OF INSULIN (H): Primary | ICD-10-CM

## 2023-07-12 LAB — HBA1C MFR BLD: 6.9 % (ref 0–5.6)

## 2023-07-12 PROCEDURE — 36415 COLL VENOUS BLD VENIPUNCTURE: CPT | Performed by: PHYSICIAN ASSISTANT

## 2023-07-12 PROCEDURE — 99213 OFFICE O/P EST LOW 20 MIN: CPT | Performed by: PHYSICIAN ASSISTANT

## 2023-07-12 PROCEDURE — 83036 HEMOGLOBIN GLYCOSYLATED A1C: CPT | Performed by: PHYSICIAN ASSISTANT

## 2023-07-12 RX ORDER — FLUCONAZOLE 150 MG/1
TABLET ORAL
Qty: 4 TABLET | Refills: 0 | Status: ON HOLD | OUTPATIENT
Start: 2023-07-12 | End: 2023-11-11

## 2023-07-12 ASSESSMENT — PAIN SCALES - GENERAL: PAINLEVEL: NO PAIN (0)

## 2023-07-12 NOTE — PROGRESS NOTES
"      Ady Hale is a 42 year old, presenting for the following health issues:  Diabetes (recheck)        7/12/2023     7:35 AM   Additional Questions   Roomed by Ange Cunningham CMA   Accompanied by None         7/12/2023     7:35 AM   Patient Reported Additional Medications   Patient reports taking the following new medications none     History of Present Illness       Diabetes:   He presents for follow up of diabetes.  He is not checking blood glucose. He has no concerns regarding his diabetes at this time.  He is not experiencing numbness or burning in feet, excessive thirst, blurry vision, weight changes or redness, sores or blisters on feet.         Hyperlipidemia:  He presents for follow up of hyperlipidemia.  He is taking medication to lower cholesterol. He is not having myalgia or other side effects to statin medications.    He eats 2-3 servings of fruits and vegetables daily.He consumes 1 sweetened beverage(s) daily.He exercises with enough effort to increase his heart rate 10 to 19 minutes per day.  He exercises with enough effort to increase his heart rate 5 days per week.   He is taking medications regularly.       No polyuria/polydipsia. No profound fatigue.  No chest pain/sob/palpitations/dizziness/ha's  Not checking sugars.   No vision changes.  No neuropathy symptoms     Review of Systems   Constitutional, HEENT, cardiovascular, pulmonary, GI, , musculoskeletal, neuro, skin, endocrine and psych systems are negative, except as otherwise noted.      Objective    /78   Pulse 95   Temp 97.6  F (36.4  C) (Temporal)   Resp 24   Ht 1.861 m (6' 1.25\")   Wt (!) 173.8 kg (383 lb 3.2 oz)   SpO2 97%   BMI 50.21 kg/m    Body mass index is 50.21 kg/m .  Physical Exam   Eye exam - right eye normal lid, conjunctiva, cornea, pupil and fundus, left eye normal lid, conjunctiva, cornea, pupil and fundus.  Thyroid not palpable, not enlarged, no nodules detected.  CHEST:chest clear to IPPA, no " tachypnea, retractions or cyanosis and S1, S2 normal, no murmur, no gallop, rate regular.  Slight scaly annular shaped patchy salmon colored lesions on neck and shoulders.     Geoffrey was seen today for diabetes.    Diagnoses and all orders for this visit:    Type 2 diabetes mellitus with microalbuminuria, without long-term current use of insulin (H)  -     Hemoglobin A1c; Future  -     Hemoglobin A1c    Tinea versicolor  -     fluconazole (DIFLUCAN) 150 MG tablet; Take 2 tabs today and 2 tabs in one week      work on lifestyle modification  Continue all current meds.  Recheck in 6 mos

## 2023-09-01 DIAGNOSIS — E78.5 HYPERLIPIDEMIA LDL GOAL <100: ICD-10-CM

## 2023-09-01 RX ORDER — ROSUVASTATIN CALCIUM 20 MG/1
20 TABLET, COATED ORAL DAILY
Qty: 90 TABLET | Refills: 3 | Status: SHIPPED | OUTPATIENT
Start: 2023-09-01 | End: 2024-01-11

## 2023-11-06 ENCOUNTER — E-VISIT (OUTPATIENT)
Dept: URGENT CARE | Facility: CLINIC | Age: 43
End: 2023-11-06
Payer: COMMERCIAL

## 2023-11-06 DIAGNOSIS — R06.2 WHEEZING: Primary | ICD-10-CM

## 2023-11-06 PROCEDURE — 99207 PR NON-BILLABLE SERV PER CHARTING: CPT | Performed by: PHYSICIAN ASSISTANT

## 2023-11-07 NOTE — PATIENT INSTRUCTIONS
Dear Geoffrey Wilson,    We are sorry you are not feeling well. Based on the responses you provided, it is recommended that you be seen in-person in urgent care so we can better evaluate your symptoms. Please click here to find the nearest urgent care location to you.   You will not be charged for this Visit. Thank you for trusting us with your care.    Devyn Argueta PA-C

## 2023-11-11 ENCOUNTER — HOSPITAL ENCOUNTER (INPATIENT)
Facility: CLINIC | Age: 43
LOS: 3 days | Discharge: HOME | DRG: 314 | End: 2023-11-14
Attending: INTERNAL MEDICINE | Admitting: INTERNAL MEDICINE
Payer: COMMERCIAL

## 2023-11-11 DIAGNOSIS — Z29.9 PROPHYLACTIC MEASURE: ICD-10-CM

## 2023-11-11 DIAGNOSIS — J18.9 PNEUMONIA OF BOTH LOWER LOBES DUE TO INFECTIOUS ORGANISM: ICD-10-CM

## 2023-11-11 DIAGNOSIS — I31.39 PERICARDIAL EFFUSION: Primary | ICD-10-CM

## 2023-11-11 LAB
ALBUMIN SERPL BCG-MCNC: 3 G/DL (ref 3.5–5.2)
ALP SERPL-CCNC: 184 U/L (ref 40–129)
ALT SERPL W P-5'-P-CCNC: 229 U/L (ref 0–70)
ANION GAP SERPL CALCULATED.3IONS-SCNC: 14 MMOL/L (ref 7–15)
AST SERPL W P-5'-P-CCNC: 171 U/L (ref 0–45)
BILIRUB SERPL-MCNC: 0.4 MG/DL
BUN SERPL-MCNC: 12.8 MG/DL (ref 6–20)
CALCIUM SERPL-MCNC: 8.7 MG/DL (ref 8.6–10)
CHLORIDE SERPL-SCNC: 101 MMOL/L (ref 98–107)
CREAT SERPL-MCNC: 0.75 MG/DL (ref 0.67–1.17)
DEPRECATED HCO3 PLAS-SCNC: 21 MMOL/L (ref 22–29)
EGFRCR SERPLBLD CKD-EPI 2021: >90 ML/MIN/1.73M2
ERYTHROCYTE [DISTWIDTH] IN BLOOD BY AUTOMATED COUNT: 13.9 % (ref 10–15)
GLUCOSE BLDC GLUCOMTR-MCNC: 102 MG/DL (ref 70–99)
GLUCOSE BLDC GLUCOMTR-MCNC: 133 MG/DL (ref 70–99)
GLUCOSE SERPL-MCNC: 95 MG/DL (ref 70–99)
HCT VFR BLD AUTO: 37.3 % (ref 40–53)
HGB BLD-MCNC: 12.5 G/DL (ref 13.3–17.7)
HOLD SPECIMEN: NORMAL
LACTATE SERPL-SCNC: 1 MMOL/L (ref 0.7–2)
MCH RBC QN AUTO: 28.9 PG (ref 26.5–33)
MCHC RBC AUTO-ENTMCNC: 33.5 G/DL (ref 31.5–36.5)
MCV RBC AUTO: 86 FL (ref 78–100)
PLATELET # BLD AUTO: 333 10E3/UL (ref 150–450)
POTASSIUM SERPL-SCNC: 3.7 MMOL/L (ref 3.4–5.3)
PROCALCITONIN SERPL IA-MCNC: 0.61 NG/ML
PROT SERPL-MCNC: 6.6 G/DL (ref 6.4–8.3)
RBC # BLD AUTO: 4.32 10E6/UL (ref 4.4–5.9)
SODIUM SERPL-SCNC: 136 MMOL/L (ref 135–145)
TROPONIN T SERPL HS-MCNC: 12 NG/L
WBC # BLD AUTO: 11.1 10E3/UL (ref 4–11)

## 2023-11-11 PROCEDURE — 87486 CHLMYD PNEUM DNA AMP PROBE: CPT | Performed by: INTERNAL MEDICINE

## 2023-11-11 PROCEDURE — 36415 COLL VENOUS BLD VENIPUNCTURE: CPT | Performed by: INTERNAL MEDICINE

## 2023-11-11 PROCEDURE — 87040 BLOOD CULTURE FOR BACTERIA: CPT | Performed by: INTERNAL MEDICINE

## 2023-11-11 PROCEDURE — 250N000011 HC RX IP 250 OP 636: Mod: JZ | Performed by: INTERNAL MEDICINE

## 2023-11-11 PROCEDURE — 83605 ASSAY OF LACTIC ACID: CPT | Performed by: INTERNAL MEDICINE

## 2023-11-11 PROCEDURE — 210N000002 HC R&B HEART CARE

## 2023-11-11 PROCEDURE — 84484 ASSAY OF TROPONIN QUANT: CPT | Performed by: INTERNAL MEDICINE

## 2023-11-11 PROCEDURE — 85027 COMPLETE CBC AUTOMATED: CPT | Performed by: INTERNAL MEDICINE

## 2023-11-11 PROCEDURE — 99223 1ST HOSP IP/OBS HIGH 75: CPT | Performed by: INTERNAL MEDICINE

## 2023-11-11 PROCEDURE — 84145 PROCALCITONIN (PCT): CPT | Performed by: INTERNAL MEDICINE

## 2023-11-11 PROCEDURE — 80053 COMPREHEN METABOLIC PANEL: CPT | Performed by: INTERNAL MEDICINE

## 2023-11-11 PROCEDURE — 82962 GLUCOSE BLOOD TEST: CPT

## 2023-11-11 PROCEDURE — 258N000003 HC RX IP 258 OP 636: Performed by: INTERNAL MEDICINE

## 2023-11-11 PROCEDURE — 87633 RESP VIRUS 12-25 TARGETS: CPT | Performed by: INTERNAL MEDICINE

## 2023-11-11 RX ORDER — AZITHROMYCIN 250 MG/1
250 TABLET, FILM COATED ORAL DAILY
Status: DISCONTINUED | OUTPATIENT
Start: 2023-11-12 | End: 2023-11-14 | Stop reason: HOSPADM

## 2023-11-11 RX ORDER — ACETAMINOPHEN 325 MG/1
650 TABLET ORAL EVERY 6 HOURS PRN
Status: DISCONTINUED | OUTPATIENT
Start: 2023-11-11 | End: 2023-11-14 | Stop reason: HOSPADM

## 2023-11-11 RX ORDER — NICOTINE POLACRILEX 4 MG
15-30 LOZENGE BUCCAL
Status: DISCONTINUED | OUTPATIENT
Start: 2023-11-11 | End: 2023-11-14 | Stop reason: HOSPADM

## 2023-11-11 RX ORDER — DEXTROSE MONOHYDRATE 25 G/50ML
25-50 INJECTION, SOLUTION INTRAVENOUS
Status: DISCONTINUED | OUTPATIENT
Start: 2023-11-11 | End: 2023-11-14 | Stop reason: HOSPADM

## 2023-11-11 RX ORDER — CEFTRIAXONE 2 G/1
2 INJECTION, POWDER, FOR SOLUTION INTRAMUSCULAR; INTRAVENOUS EVERY 24 HOURS
Status: DISCONTINUED | OUTPATIENT
Start: 2023-11-12 | End: 2023-11-14 | Stop reason: HOSPADM

## 2023-11-11 RX ORDER — ONDANSETRON 4 MG/1
4 TABLET, ORALLY DISINTEGRATING ORAL EVERY 6 HOURS PRN
Status: DISCONTINUED | OUTPATIENT
Start: 2023-11-11 | End: 2023-11-14 | Stop reason: HOSPADM

## 2023-11-11 RX ORDER — ENOXAPARIN SODIUM 100 MG/ML
40 INJECTION SUBCUTANEOUS EVERY 12 HOURS
Status: DISCONTINUED | OUTPATIENT
Start: 2023-11-11 | End: 2023-11-14

## 2023-11-11 RX ORDER — SODIUM CHLORIDE 9 MG/ML
INJECTION, SOLUTION INTRAVENOUS CONTINUOUS
Status: DISCONTINUED | OUTPATIENT
Start: 2023-11-11 | End: 2023-11-12

## 2023-11-11 RX ORDER — ONDANSETRON 2 MG/ML
4 INJECTION INTRAMUSCULAR; INTRAVENOUS EVERY 6 HOURS PRN
Status: DISCONTINUED | OUTPATIENT
Start: 2023-11-11 | End: 2023-11-14 | Stop reason: HOSPADM

## 2023-11-11 RX ORDER — ACETAMINOPHEN 650 MG/1
650 SUPPOSITORY RECTAL EVERY 6 HOURS PRN
Status: DISCONTINUED | OUTPATIENT
Start: 2023-11-11 | End: 2023-11-14 | Stop reason: HOSPADM

## 2023-11-11 RX ORDER — LIDOCAINE 40 MG/G
CREAM TOPICAL
Status: DISCONTINUED | OUTPATIENT
Start: 2023-11-11 | End: 2023-11-14 | Stop reason: HOSPADM

## 2023-11-11 RX ADMIN — ENOXAPARIN SODIUM 40 MG: 40 INJECTION SUBCUTANEOUS at 20:12

## 2023-11-11 RX ADMIN — SODIUM CHLORIDE: 9 INJECTION, SOLUTION INTRAVENOUS at 20:16

## 2023-11-11 ASSESSMENT — ACTIVITIES OF DAILY LIVING (ADL)
ADLS_ACUITY_SCORE: 18
DRESSING/BATHING_DIFFICULTY: NO
ADLS_ACUITY_SCORE: 31
DIFFICULTY_EATING/SWALLOWING: NO
TOILETING_ISSUES: NO
WEAR_GLASSES_OR_BLIND: NO
WALKING_OR_CLIMBING_STAIRS_DIFFICULTY: NO
CONCENTRATING,_REMEMBERING_OR_MAKING_DECISIONS_DIFFICULTY: NO
FALL_HISTORY_WITHIN_LAST_SIX_MONTHS: NO
CHANGE_IN_FUNCTIONAL_STATUS_SINCE_ONSET_OF_CURRENT_ILLNESS/INJURY: NO
DOING_ERRANDS_INDEPENDENTLY_DIFFICULTY: NO
ADLS_ACUITY_SCORE: 31

## 2023-11-11 NOTE — H&P
[facility]    History and Physical - Hospitalist Service       Date of Admission:  11/11/2023    Assessment & Plan      Geoffrey Wilson is a 43 year old male with past medical history of hypertension, hyperlipidemia and type 2 diabetes that is well managed who was admitted from [place] ER on 11/11/2023 management of community-acquired pneumonia and a pericardial effusion without initial evidence of tamponade.    Acute hypoxic respiratory failure secondary to bilateral CAP, organism unspecified  Recent viral URI  *Had 1 week history of viral URI symptoms, negative for COVID on 11/7/23.  Presented to [place] ED day of admission with a 3-day history of worsening dyspnea on exertion and fatigue. Some shortness of breath at rest but significantly exacerbated with minimal activity.  Occasional cough minimal sputum production at this point.   He did note he coughed up some blood earlier today but this has not persisted.  No chest pain. Appetite has been poor but no associated nausea or vomiting.  *In ED, was afebrile, tachycardic with heart rates 110, blood pressure stable, O2 sats low 90s.  WBC 12.0, lactate 1.4; lytes renal function and hemoglobin all stable.  LFTs mildly elevated.  CT chest was negative for PE, showed dense left lower lobe and right lower lobe consolidations.  Also found to have a pericardial effusion without evidence of tamponade.  In the ED, was given IV fluids.  Blood pressure and heart rate subsequently improved.  Was started on treatment for presumed CAP with ceftriaxone and azithromycin.  Was transferred to [facility] due to possible need for cardiac intervention on pericardial effusion.  -- repeat labs ordered including procal and lactate; respiratory virus PCR and blood cultures also ordered  -- cont treatment for CAP with ceftriaxone and azithromycin  -- presently needing 4L NC, encourage pulmonary toilet and OOB    Moderate pericardial effusion  *Noted to have a  "moderate pericardial effusion on CT chest in ED. No reports of chest pain.  Blood pressures stable.  Initial troponin and proBNP in ED were normal.  -- monitor on telemetry  -- repeat troponin  -- check echocardiogram  -- cardiology consult    Hypertension  Hyperlipidemia  Home meds: amlodipine/valsartan 5/160 mg daily, rosuvastatin 20 mg daily  -- hold antihypertensives for tonight until echo  -- hold statin dt abnl LFTs    DM II, well managed  *A1c 6.9 in 7/2023. Manages with metformin 1000 mg BID, glipizide 20 mg daily, Jardiance 25 mg daily, Ozempic 2 mg weekly on Tues.  -- hold oral meds due to acute illness and will manage with sliding scale insulin for now    Abnormal LFTs  *No prior record of LFTs in [facility] or Care Everywhere ([facility] or [facility]).  Unclear if findings represent acute change versus chronic abnormality. Concern for possible component of fatty liver given BMI vs due to Ozempic use vs due to statin vs due to other etiology?  -- trend LFTs  -- check RUQ US  -- further evaluation if LFTs remain elevated    Severe Obesity  *BMI 51. Noted. Increased all-cause morbidity/mortality. Currently on Ozempic    Diet:  mod CHO diet  DVT Prophylaxis: Enoxaparin (Lovenox) 40mg subQ BID  English Catheter: Not present  Lines: None     Cardiac Monitoring: None  Code Status:  Full code      Clinically Significant Risk Factors Present on Admission                  # Hypertension: Noted on problem list     # DMII: A1C = N/A within past 6 months    # Severe Obesity: Estimated body mass index is 51.19 kg/m  (pended) as calculated from the following:    Height as of this encounter: (P) 1.854 m (6' 1\").    Weight as of this encounter: (P) 176 kg (388 lb).              Disposition Plan      Expected Discharge Date: 11/13/2023                  Yolanda Sanchez DO  Hospitalist Service  [facility]  Securely message with [employer] (more info)  Text page via [employer] Paging/Directory "     ______________________________________________________________________    Chief Complaint   Worsening shortness of breath, fatigue    History is obtained from the patient, electronic health record, and emergency department physician    History of Present Illness   Geoffrey Wilson is a 43 year old male with past medical history of hypertension, hyperlipidemia and type 2 diabetes that is well managed who was preinitially presented to [place] ER on 11/11/2023 for evaluation of worsening shortness of breath and fatigue    Had 1 week history of viral URI symptoms, negative for COVID on 11/7/23.  Presented to [place] ED day of admission with a 3-day history of worsening dyspnea on exertion and fatigue. Some shortness of breath at rest but significantly exacerbated with minimal activity.  Occasional cough minimal sputum production at this point.   He did note he coughed up some blood earlier today but this has not persisted.  No chest pain. Appetite has been poor but no associated nausea or vomiting.    In ED, was afebrile, tachycardic with heart rates 110, blood pressure stable, O2 sats low 90s.  WBC 12.0, lactate 1.4; lytes renal function and hemoglobin all stable.  LFTs mildly elevated.  CT chest was negative for PE, showed dense left lower lobe and right lower lobe consolidations.  Also found to have a pericardial effusion without evidence of tamponade.  In the ED, was given IV fluids.  Blood pressure and heart rate subsequently improved.  Was started on treatment for presumed CAP with ceftriaxone and azithromycin.  Was transferred to [facility] due to possible need for cardiac intervention on pericardial effusion.    Seen this evening shortly after arrival.  Patient presently on 4 L nasal cannula.  Heart rates initially 120s when I entered the room however subsequently improved to the low 100s.  Blood pressures elevated with .  Patient is presently resting comfortably.  He is alert and conversing with his  wife on the phone in complete sentences.  No specific complaints at this juncture.  Reiterates he had felt ill with viral type symptoms for the past week.  Tested negative for COVID as above.  He actually had started feeling better however in the past few days, specifically the past 1 to 2 days.  He has noted profound dyspnea on exertion.  His breathing is generally okay at rest but becomes more labored with minimal exertion.  No associated chest pain.  He has had noted an occasional cough, today it was productive of some scant bloody sputum.  Appetite has not been great but he has had no specific nausea or vomiting.  Patient and spouse expressed concern that they have a 2-month-old at home and whether they need to be concerned with her in regards to a possible viral infection.    Past Medical History    Past Medical History:   Diagnosis Date    Cellulitis-scrotum 08/15/2016    Diabetes mellitus, type 2 (H)     HTN (hypertension)     Hyperlipidemia LDL goal <100     SIRS (systemic inflammatory response syndrome) (H) 2016       Past Surgical History   Past Surgical History:   Procedure Laterality Date    HC REPAIR ING HERNIA,5+Y/O,REDUCIBL  Age 6       Prior to Admission Medications   Prior to Admission Medications   Prescriptions Last Dose Informant Patient Reported? Taking?   ONETOUCH VERIO IQ test strip   No No   Sig: Use to test blood sugar 1-2 times daily or as directed.   OneTouch Delica Lancets 33G MISC   No No   Si each 2 times daily   Semaglutide, 2 MG/DOSE, (OZEMPIC) 8 MG/3ML pen 2023 at Tue  No Yes   Sig: Inject 2 mg Subcutaneous every 7 days   amLODIPine-valsartan (EXFORGE) 5-160 MG tablet 11/10/2023 at pm  No Yes   Sig: TAKE 1 TABLET EVERY DAY   empagliflozin (JARDIANCE) 25 MG TABS tablet 11/10/2023  No Yes   Sig: Take 1 tablet (25 mg) by mouth daily   glipiZIDE (GLUCOTROL XL) 10 MG 24 hr tablet 2023  No Yes   Sig: Take 2 tablets (20 mg) by mouth daily   insulin pen needle (31G X 8  MM) 31G X 8 MM miscellaneous   No No   Sig: Use 1 pen needles once a week or as directed.   metFORMIN (GLUCOPHAGE) 1000 MG tablet 2023 at am  No Yes   Sig: TAKE 1 TABLET TWICE A DAY WITH MEALS   rosuvastatin (CRESTOR) 20 MG tablet 11/10/2023  No Yes   Sig: Take 1 tablet (20 mg) by mouth daily   sildenafil (REVATIO) 20 MG tablet Not Taking  No No   Si-5 tabs daily prn   Patient not taking: Reported on 2023      Facility-Administered Medications: None        Review of Systems    The 10 point Review of Systems is negative other than noted in the HPI or here.      Physical Exam   Vital Signs: Temp: 98.6  F (37  C) Temp src: Oral BP: (!) 150/88 Pulse: 102   Resp: 22 SpO2: 95 %      Weight: 0 lbs 0 oz    General Appearance: WNWD male, appears stated age, alert and conversing appropriately, NAD  Respiratory: Breath sounds are slightly diminished at the bilateral lung bases, no wheeze, no rales, no increased work of breathing or accessory muscle use  Cardiovascular: HRRR, no MGR, no LE edema  GI: obese, S, NT, ND, +BS  Skin: warm/dry  Other:     Medical Decision Making       75 MINUTES SPENT BY ME on the date of service doing chart review, history, exam, documentation & further activities per the note.      Data       Basic labs drawn in [place] [facility] (in care everywhere through [facility])  Sodium 135, potassium 4.5, bicarb 26, creatinine 0.91, glucose 215, calcium 9.0, anion gap 9.0, high-sensitivity troponin 12, proBNP 60.  LFTs with , , alk phos 180, total bilirubin 0.8, albumin 2.2, total protein 7.1.  CBC with WBC 12.0, hemoglobin 13.2, hematocrit 38.7, platelet count 362.  Lactate 1.4.        Imaging results reviewed over the past 24 hrs:   Recent Results (from the past 24 hour(s))   CT CHEST PULMONARY ANGIO    Narrative    EXAM: CT CHEST PULMONARY ANGIO    DATE: 2023 10:29 AM    COMPARISON: None    CLINICAL DATA: Chest Pain.    TECHNIQUE: A CT angiogram (CTA) of the  pulmonary arteries and chest was performed.  Specifically, preliminary unenhanced images were obtained through the pulmonary arteries.  Following this, during bolus infusion of intravenous contrast, thin-section contiguous transaxial images were obtained through the thorax.  In addition, multiplanar and three dimensional (3D) reformations through the pulmonary arterial tree were generated from the acquisition scanner and reviewed. A total of 100 cc of Omnipaque 350 were administered intravenously for this study.    FINDINGS:  No evidence of pulmonary embolus. The heart is normal in size. Moderate pericardial effusion. Dense consolidative density is present throughout the left lung. A few patchy consolidative opacities are present in the right lower lung. No significant pleural effusions.    Impression    IMPRESSION:      1. No evidence of pulmonary embolus.  2. Bilateral pneumonia.  3. Moderate pericardial effusion.      REPORT SIGNED BY DR. Sanjay Morocho

## 2023-11-11 NOTE — LETTER
[facility]  [address]  [place] [address]  Phone: [phone]    November 14, 2023        Geoffrey Wilson  [id number] [place] [id number]          To whom it may concern:    RE: Geoffrey Wilson was hospitalized at [facility] from 11/11 through 11/14. He may return to work on 11/16/23.     Please contact me for questions or concerns.      Sincerely,        Bri Marie PA-C

## 2023-11-12 ENCOUNTER — APPOINTMENT (OUTPATIENT)
Dept: CARDIOLOGY | Facility: CLINIC | Age: 43
DRG: 314 | End: 2023-11-12
Attending: INTERNAL MEDICINE
Payer: COMMERCIAL

## 2023-11-12 ENCOUNTER — APPOINTMENT (OUTPATIENT)
Dept: ULTRASOUND IMAGING | Facility: CLINIC | Age: 43
DRG: 314 | End: 2023-11-12
Attending: INTERNAL MEDICINE
Payer: COMMERCIAL

## 2023-11-12 LAB
ALBUMIN SERPL BCG-MCNC: 3 G/DL (ref 3.5–5.2)
ALP SERPL-CCNC: 172 U/L (ref 40–129)
ALT SERPL W P-5'-P-CCNC: 207 U/L (ref 0–70)
ANION GAP SERPL CALCULATED.3IONS-SCNC: 12 MMOL/L (ref 7–15)
AST SERPL W P-5'-P-CCNC: 135 U/L (ref 0–45)
BILIRUB SERPL-MCNC: 0.4 MG/DL
BUN SERPL-MCNC: 11 MG/DL (ref 6–20)
C PNEUM DNA SPEC QL NAA+PROBE: NOT DETECTED
CALCIUM SERPL-MCNC: 8.6 MG/DL (ref 8.6–10)
CHLORIDE SERPL-SCNC: 103 MMOL/L (ref 98–107)
CREAT SERPL-MCNC: 0.71 MG/DL (ref 0.67–1.17)
CREAT SERPL-MCNC: 0.72 MG/DL (ref 0.67–1.17)
DEPRECATED HCO3 PLAS-SCNC: 23 MMOL/L (ref 22–29)
EGFRCR SERPLBLD CKD-EPI 2021: >90 ML/MIN/1.73M2
EGFRCR SERPLBLD CKD-EPI 2021: >90 ML/MIN/1.73M2
ERYTHROCYTE [DISTWIDTH] IN BLOOD BY AUTOMATED COUNT: 14 % (ref 10–15)
FLUAV H1 2009 PAND RNA SPEC QL NAA+PROBE: NOT DETECTED
FLUAV H1 RNA SPEC QL NAA+PROBE: NOT DETECTED
FLUAV H3 RNA SPEC QL NAA+PROBE: NOT DETECTED
FLUAV RNA SPEC QL NAA+PROBE: NOT DETECTED
FLUBV RNA SPEC QL NAA+PROBE: NOT DETECTED
GLUCOSE BLDC GLUCOMTR-MCNC: 134 MG/DL (ref 70–99)
GLUCOSE BLDC GLUCOMTR-MCNC: 160 MG/DL (ref 70–99)
GLUCOSE BLDC GLUCOMTR-MCNC: 178 MG/DL (ref 70–99)
GLUCOSE BLDC GLUCOMTR-MCNC: 93 MG/DL (ref 70–99)
GLUCOSE SERPL-MCNC: 105 MG/DL (ref 70–99)
HADV DNA SPEC QL NAA+PROBE: NOT DETECTED
HCOV PNL SPEC NAA+PROBE: NOT DETECTED
HCT VFR BLD AUTO: 36.5 % (ref 40–53)
HGB BLD-MCNC: 12 G/DL (ref 13.3–17.7)
HMPV RNA SPEC QL NAA+PROBE: NOT DETECTED
HPIV1 RNA SPEC QL NAA+PROBE: NOT DETECTED
HPIV2 RNA SPEC QL NAA+PROBE: NOT DETECTED
HPIV3 RNA SPEC QL NAA+PROBE: NOT DETECTED
HPIV4 RNA SPEC QL NAA+PROBE: NOT DETECTED
LVEF ECHO: NORMAL
M PNEUMO DNA SPEC QL NAA+PROBE: NOT DETECTED
MCH RBC QN AUTO: 28.6 PG (ref 26.5–33)
MCHC RBC AUTO-ENTMCNC: 32.9 G/DL (ref 31.5–36.5)
MCV RBC AUTO: 87 FL (ref 78–100)
PLATELET # BLD AUTO: 298 10E3/UL (ref 150–450)
POTASSIUM SERPL-SCNC: 3.8 MMOL/L (ref 3.4–5.3)
PROT SERPL-MCNC: 6.3 G/DL (ref 6.4–8.3)
RBC # BLD AUTO: 4.19 10E6/UL (ref 4.4–5.9)
RSV RNA SPEC QL NAA+PROBE: NOT DETECTED
RSV RNA SPEC QL NAA+PROBE: NOT DETECTED
RV+EV RNA SPEC QL NAA+PROBE: NOT DETECTED
SODIUM SERPL-SCNC: 138 MMOL/L (ref 135–145)
WBC # BLD AUTO: 11 10E3/UL (ref 4–11)

## 2023-11-12 PROCEDURE — 82565 ASSAY OF CREATININE: CPT | Performed by: INTERNAL MEDICINE

## 2023-11-12 PROCEDURE — 99223 1ST HOSP IP/OBS HIGH 75: CPT | Mod: 25 | Performed by: INTERNAL MEDICINE

## 2023-11-12 PROCEDURE — 250N000013 HC RX MED GY IP 250 OP 250 PS 637: Performed by: INTERNAL MEDICINE

## 2023-11-12 PROCEDURE — 76705 ECHO EXAM OF ABDOMEN: CPT

## 2023-11-12 PROCEDURE — 93306 TTE W/DOPPLER COMPLETE: CPT | Mod: 26 | Performed by: INTERNAL MEDICINE

## 2023-11-12 PROCEDURE — 255N000002 HC RX 255 OP 636: Performed by: INTERNAL MEDICINE

## 2023-11-12 PROCEDURE — 93005 ELECTROCARDIOGRAM TRACING: CPT

## 2023-11-12 PROCEDURE — 80053 COMPREHEN METABOLIC PANEL: CPT | Performed by: INTERNAL MEDICINE

## 2023-11-12 PROCEDURE — 250N000012 HC RX MED GY IP 250 OP 636 PS 637: Performed by: INTERNAL MEDICINE

## 2023-11-12 PROCEDURE — 258N000003 HC RX IP 258 OP 636: Performed by: INTERNAL MEDICINE

## 2023-11-12 PROCEDURE — 85014 HEMATOCRIT: CPT | Performed by: INTERNAL MEDICINE

## 2023-11-12 PROCEDURE — 36415 COLL VENOUS BLD VENIPUNCTURE: CPT | Performed by: INTERNAL MEDICINE

## 2023-11-12 PROCEDURE — 99232 SBSQ HOSP IP/OBS MODERATE 35: CPT | Performed by: HOSPITALIST

## 2023-11-12 PROCEDURE — 250N000011 HC RX IP 250 OP 636: Mod: JZ | Performed by: INTERNAL MEDICINE

## 2023-11-12 PROCEDURE — 250N000013 HC RX MED GY IP 250 OP 250 PS 637: Performed by: HOSPITALIST

## 2023-11-12 PROCEDURE — 93010 ELECTROCARDIOGRAM REPORT: CPT | Performed by: INTERNAL MEDICINE

## 2023-11-12 PROCEDURE — 210N000002 HC R&B HEART CARE

## 2023-11-12 PROCEDURE — 999N000208 ECHOCARDIOGRAM COMPLETE

## 2023-11-12 RX ORDER — ACYCLOVIR 200 MG/1
10 CAPSULE ORAL ONCE
Status: DISCONTINUED | OUTPATIENT
Start: 2023-11-12 | End: 2023-11-14

## 2023-11-12 RX ORDER — AMLODIPINE BESYLATE 5 MG/1
5 TABLET ORAL DAILY
Status: DISCONTINUED | OUTPATIENT
Start: 2023-11-12 | End: 2023-11-14 | Stop reason: HOSPADM

## 2023-11-12 RX ORDER — VALSARTAN 160 MG/1
160 TABLET ORAL DAILY
Status: DISCONTINUED | OUTPATIENT
Start: 2023-11-12 | End: 2023-11-14 | Stop reason: HOSPADM

## 2023-11-12 RX ADMIN — INSULIN ASPART 1 UNITS: 100 INJECTION, SOLUTION INTRAVENOUS; SUBCUTANEOUS at 18:29

## 2023-11-12 RX ADMIN — ENOXAPARIN SODIUM 40 MG: 40 INJECTION SUBCUTANEOUS at 08:34

## 2023-11-12 RX ADMIN — VALSARTAN 160 MG: 160 TABLET, FILM COATED ORAL at 11:50

## 2023-11-12 RX ADMIN — HUMAN ALBUMIN MICROSPHERES AND PERFLUTREN 6 ML: 10; .22 INJECTION, SOLUTION INTRAVENOUS at 11:36

## 2023-11-12 RX ADMIN — CEFTRIAXONE SODIUM 2 G: 2 INJECTION, POWDER, FOR SOLUTION INTRAMUSCULAR; INTRAVENOUS at 11:50

## 2023-11-12 RX ADMIN — SODIUM CHLORIDE: 9 INJECTION, SOLUTION INTRAVENOUS at 05:30

## 2023-11-12 RX ADMIN — ENOXAPARIN SODIUM 40 MG: 40 INJECTION SUBCUTANEOUS at 21:04

## 2023-11-12 RX ADMIN — AZITHROMYCIN DIHYDRATE 250 MG: 250 TABLET ORAL at 08:34

## 2023-11-12 RX ADMIN — AMLODIPINE BESYLATE 5 MG: 5 TABLET ORAL at 09:53

## 2023-11-12 ASSESSMENT — ACTIVITIES OF DAILY LIVING (ADL)
ADLS_ACUITY_SCORE: 20

## 2023-11-12 NOTE — PHARMACY-ADMISSION MEDICATION HISTORY
Pharmacist Admission Medication History    Admission medication history is complete. The information provided in this note is only as accurate as the sources available at the time of the update.    Information Source(s): Patient and CareEverywhere/SureScripts via in-person    Pertinent Information:     Changes made to PTA medication list:  Added: None  Deleted: fluconazole, lamisil  Changed: None       Allergies reviewed with patient and updates made in EHR: no    Medication History Completed By: Akil Aliheyder, RPH 11/11/2023 7:18 PM    PTA Med List   Medication Sig Last Dose    amLODIPine-valsartan (EXFORGE) 5-160 MG tablet TAKE 1 TABLET EVERY DAY 11/10/2023 at pm    empagliflozin (JARDIANCE) 25 MG TABS tablet Take 1 tablet (25 mg) by mouth daily 11/10/2023    glipiZIDE (GLUCOTROL XL) 10 MG 24 hr tablet Take 2 tablets (20 mg) by mouth daily 11/11/2023    metFORMIN (GLUCOPHAGE) 1000 MG tablet TAKE 1 TABLET TWICE A DAY WITH MEALS 11/11/2023 at am    rosuvastatin (CRESTOR) 20 MG tablet Take 1 tablet (20 mg) by mouth daily 11/10/2023    Semaglutide, 2 MG/DOSE, (OZEMPIC) 8 MG/3ML pen Inject 2 mg Subcutaneous every 7 days 11/7/2023 at e

## 2023-11-12 NOTE — PLAN OF CARE
DATE & TIME: 11/11/23 1900 - 11/12/23 0700    COGNITION/BEHAVIOR: A&Ox4  Vital signs:  Temp: 98.4  F (36.9  C) Temp src: Oral BP: (!) 150/95 Pulse: 100   Resp: 20 SpO2: 90 % via 4 LPM NC  TELEMETRY RHYTHM: SR  MOBILITY: SBA due to O2 and IV tubing  PAIN: Denies  DIET: Mod carb, though has been NPO since midnight  RESP: Lung sounds diminished in the bases.  CABALLERO.  Provided IS and education on use, tolerated well with nonproductive bronchospastic cough.  CV: HRRR.  No reports of chest pain.  LINES: PIV infusing IVF  LAB/BG: , 93  TESTS/PROCEDURES: Due for abdominal US and Cardiology Consult today.  Respiratory panel negative.

## 2023-11-12 NOTE — PLAN OF CARE
Orientation: A/Ox4     Vitals/Tele: VSS- HTN, LSD - 3LNC, afebrile, NSR    IV Access/drains: 1 PIV - SL    Diet: CC - adequate appetite    Mobility: Independent    GI/: BS audible, adequate UO via toilet    Wound/Skin: WDL    Consults: Cards    Discharge Plan: Cardiac MRI Scheduled for tomorrow - NPO 3 hours prior to MRI       See Flow sheets for assessment      Goal Outcome Evaluation:

## 2023-11-12 NOTE — PLAN OF CARE
Goal Outcome Evaluation:  Patient alert, steady with ind ambulation. CABALLERO/cough improving, O2 at 3L 92%. LS clear. IV Rocephin, po zithromax. VSS SR.Denies pain. Echo done, cards consulted.       Plan of Care Reviewed With: patient

## 2023-11-12 NOTE — PROGRESS NOTES
[facility]    Medicine Progress Note - Hospitalist Service    Date of Admission:  11/11/2023    Assessment & Plan      Geoffrey Wilson is a 43 year old male with past medical history of hypertension, hyperlipidemia and type 2 diabetes that is well managed who was admitted from [place] ER on 11/11/2023 management of community-acquired pneumonia and a pericardial effusion without initial evidence of tamponade.    Acute hypoxic respiratory failure secondary to bilateral CAP, organism unspecified  Recent viral URI  -Had 1 week history of viral URI symptoms, negative for COVID on 11/7/23.  Presented to [place] ED day of admission with a 3-day history of worsening dyspnea on exertion and fatigue. Some shortness of breath at rest but significantly exacerbated with minimal activity.  Occasional cough minimal sputum production at this point.   He did note he coughed up some blood earlier today but this has not persisted.  No chest pain. Appetite has been poor but no associated nausea or vomiting.  -In ED, was afebrile, tachycardic with heart rates 110, blood pressure stable, O2 sats low 90s.  WBC 12.0, lactate 1.4; lytes renal function and hemoglobin all stable.  LFTs mildly elevated.  -CT chest was negative for PE, showed dense left lower lobe and right lower lobe consolidations.  Also found to have a pericardial effusion without evidence of tamponade.    - started on fluids , ceftriaxone and azithromycin   -Blood culture 11/11 have been negative so far  -Procal is elevated at 0.61  -Lactic acid is normal  -Respiratory panel is negative  -WBC count this morning is down to 11  - On exam he has been stable at 4L since admission and we will continue with ceftriaxone and azithromycin and wean from oxygen if tolerated     Moderate pericardial effusion  Prior history of small pericardial in 2018  -Stress echo done on 9/2018 showed small pericardial effusion  -Last echo done on 10/10/2018 showed  small pericardial effusion which was less than 1 cm in circumferential  -Noted to have a moderate pericardial effusion on CT chest in ED. No reports of chest pain.  Blood pressures stable.  Initial troponin and proBNP in ED were normal.Repeat Troponin normal  -Cards consulted  - Echo done today shows   -- monitor on telemetry      Hypertension  Hyperlipidemia  Home meds: amlodipine/valsartan 5/160 mg daily, rosuvastatin 20 mg daily  -- On admission were  antihypertensives until echo  -- hold statin dt abnl LFTs  - we will monitor and will restart his PTA blood pressure medication including amlodipine 5 mg and valsartan 160 and ordered    DM II, well managed  -A1c 6.9 in 7/2023.   -PTA Regimen-->Manages with metformin 1000 mg BID, glipizide 20 mg daily, Jardiance 25 mg daily, Ozempic 2 mg weekly on Tues.  -- hold oral meds   -continue with sliding scale insulin     Abnormal LFTs likely due to enlarged fatty liver  -No prior record of LFTs in [facility] or Care Everywhere ([facility] or [facility]).  Unclear if findings represent acute change versus chronic abnormality. Concern for possible component of fatty liver given BMI vs due to Ozempic use vs due to statin vs due to other etiology?  -US of the abdomen ordered and shows fatty liver and normal gallbladder  - LFT this am have shown mild decrease and alkaline phosphatase is down to 172, AST of 135, ALT of 207 and total bilirubin is normal  -- trend LFTs in the next 2 to 3 days  -Patient instructed to lose weight and follow as outpatient with his primary care physician for repeat LFTs    Chronic anemia  -Last hemoglobin 1 year back was 12.5 and hemoglobin this morning is 12 and continue to monitor and there is no evidence of any bleeding    Severe Obesity  -BMI 51. Noted. Increased all-cause morbidity/mortality. Currently on Ozempic        Diet: Moderate Consistent Carb (60 g CHO per Meal) Diet    DVT Prophylaxis: Enoxaparin (Lovenox) SQ  English Catheter: Not  "present  Lines: None     Cardiac Monitoring: ACTIVE order. Indication: pericardial effusion  Code Status: Full Code      Clinically Significant Risk Factors Present on Admission              # Hypoalbuminemia: Lowest albumin = 3 g/dL at 11/11/2023  6:33 PM, will monitor as appropriate     # Hypertension: Noted on problem list     # DMII: A1C = N/A within past 6 months    # Severe Obesity: Estimated body mass index is 51.19 kg/m  (pended) as calculated from the following:    Height as of this encounter: (P) 1.854 m (6' 1\").    Weight as of this encounter: (P) 176 kg (388 lb).              Disposition Plan      Expected Discharge Date: 11/13/2023                    Dahlia Ferrer MD  Hospitalist Service  [facility]  Securely message with [employer] (more info)  Text page via [employer] Paging/Directory       I am off service in am and his care to be taken over by hospital medicine team   ______________________________________________________________________    Interval History     Reviewed events of overnight   No fever or chills  Still on 4L oxygen   No chest pain   Oral intake has been good per nursing and we will review labs and plan to discontinue iv fluids as BP has been stable     Discussed with patient and his wife and they had a lot of questions which answered to satisfaction    Physical Exam   Vital Signs: Temp: 98.4  F (36.9  C) Temp src: Oral BP: (!) 150/95 Pulse: 100   Resp: 20 SpO2: 90 % O2 Device: Nasal cannula Oxygen Delivery: 4 LPM  Weight: 0 lbs 0 oz        General: Patient appears comfortable and in no acute distress.  HEENT: Head is atraumatic, normocephalic.  Pupils are equal, round and reactive to light.  No scleral icterus. Oral mucosa is moist   Neck: Neck is supple   Respiratory: Lungs are clear to auscultation bilaterally with no wheeze or crackles and he is on 4L NC  Cardiovascular: Regular rate , S1 and S2 normal with no murmer or rubs or gallops  Abdomen:   soft , non tender " , non distended and bowel sound present   Skin: No skin rashes   Neurologic: Higher functions are within normal limits. No obvious defects in speech, language and memory. No facial droop  Musculoskeletal: Normal Range of motion over upper and lower extremities bilaterally   Psychiatric: cooperative      Medical Decision Making       Time spent in the care is 45 minutes and I reviewed labs and reviewed cards note and discussed plan with patient and nursing staff      Data     I have personally reviewed the following data over the past 24 hrs:    11.0  \   12.0 (L)   / 298     138 103 11.0 /  105 (H)   3.8 23 0.72 \     ALT: 207 (H) AST: 135 (H) AP: 172 (H) TBILI: 0.4   ALB: 3.0 (L) TOT PROTEIN: 6.3 (L) LIPASE: N/A     Trop: 12 BNP: N/A     Procal: 0.61 (H) CRP: N/A Lactic Acid: 1.0         Imaging results reviewed over the past 24 hrs:   Recent Results (from the past 24 hour(s))   CT CHEST PULMONARY ANGIO    Narrative    EXAM: CT CHEST PULMONARY ANGIO    DATE: 11/11/2023 10:29 AM    COMPARISON: None    CLINICAL DATA: Chest Pain.    TECHNIQUE: A CT angiogram (CTA) of the pulmonary arteries and chest was performed.  Specifically, preliminary unenhanced images were obtained through the pulmonary arteries.  Following this, during bolus infusion of intravenous contrast, thin-section contiguous transaxial images were obtained through the thorax.  In addition, multiplanar and three dimensional (3D) reformations through the pulmonary arterial tree were generated from the acquisition scanner and reviewed. A total of 100 cc of Omnipaque 350 were administered intravenously for this study.    FINDINGS:  No evidence of pulmonary embolus. The heart is normal in size. Moderate pericardial effusion. Dense consolidative density is present throughout the left lung. A few patchy consolidative opacities are present in the right lower lung. No significant pleural effusions.    Impression    IMPRESSION:      1. No evidence of pulmonary  embolus.  2. Bilateral pneumonia.  3. Moderate pericardial effusion.      REPORT SIGNED BY DR. Sanjay Morocho   US Abdomen Limited    Narrative    EXAM: US ABDOMEN LIMITED  LOCATION: [facility]  DATE: 11/12/2023    INDICATION: assess for acute abnormality, evidence of fatty liver  COMPARISON: None.  TECHNIQUE: Limited abdominal ultrasound.    FINDINGS:    GALLBLADDER: No gallstones. No gallbladder wall thickening or pericholecystic fluid. Negative sonographic Gil sign.    BILE DUCTS: No biliary dilatation.    LIVER: Enlarged echogenic liver. No focal mass.    RIGHT KIDNEY: No hydronephrosis.    PANCREAS: The visualized portions are normal.    No ascites.      Impression    IMPRESSION:  1.  Enlarged fatty liver.  2.  Normal gallbladder.

## 2023-11-12 NOTE — CONSULTS
[facility]    Cardiology Consultation     Date of Admission:  11/11/2023    Assessment & Plan   Geoffrey Wilson is a 43 year old male who was admitted on 11/11/2023.      Moderate pericardial effusion.  No evidence of cardiac tamponade either on echocardiogram or clinically.  Echo in 2018 noted small pericardial effusion.  Does not appear to have any concerning symptoms like chest discomfort or any signs of  pericardial rub on examination or any evidence of pericarditis on EKG.  Etiology of pericardial fusion is not clear.  I do not think it is related to recent episode of pneumonia.  Discussed option and rational of doing cardiac MRI at this time.  Patient is agreeable.  We will do cardiac MRI tomorrow to further evaluate pericardial effusion look for any signs of pericardial hyperenhancement.  Admitted with hypoxic respiratory failure due to pneumonia  Obesity  Diabetes  Hypertension  Dyslipidemia  Fatty liver with abnormal liver enzymes.      Recommendations  Overall he does not appear to have any symptoms from pericardial fusion it does appear to have increased compared to previous echo in 2018 as per the report.  I am not sure about the etiology of pericardial fusion.  Cardiac MRI for further evaluation for reasons noted above  If there is evidence of more significant pericardial effusion on cardiac MRI may even consider pericardiocentesis at least for diagnostic purpose.    High complexity , echocardiogram images were personally reviewed by me.    Nasim Mcqueen MD, MD    Primary Care Physician   David Deutsch    Reason for Consult   Reason for consult: I was asked to evaluate this patient for pericardial effusion.    History of Present Illness   Geoffrey Wilson is a 43 year old male who presents with cough shortness of breath recent fevers.  He was diagnosed with [facility] pneumonia and on recent CT chest in addition to consolidative density in the left lung moderate Mook  effusion was noted.  To be noted he had an echocardiogram in 2018 as well as stress echocardiogram that noted small purulent effusion.,  The echocardiogram portion was negative for inducible ischemia on the stress test.  Patient denies any chest discomfort dizziness presyncope or syncope.  High sensitive troponin is within reference range.  He does have abnormal liver enzymes.  EKG shows sinus rhythm with low QRS voltage.    Past Medical History   Past Medical History:   Diagnosis Date    Cellulitis-scrotum 08/15/2016    Diabetes mellitus, type 2 (H)     HTN (hypertension)     Hyperlipidemia LDL goal <100     SIRS (systemic inflammatory response syndrome) (H) 2016         Past Surgical History   Past Surgical History:   Procedure Laterality Date    HC REPAIR ING HERNIA,5+Y/O,REDUCIBL  Age 6       Prior to Admission Medications   Prior to Admission Medications   Prescriptions Last Dose Informant Patient Reported? Taking?   ONETOUCH VERIO IQ test strip   No No   Sig: Use to test blood sugar 1-2 times daily or as directed.   OneTouch Delica Lancets 33G MISC   No No   Si each 2 times daily   Semaglutide, 2 MG/DOSE, (OZEMPIC) 8 MG/3ML pen 2023 at Tue  No Yes   Sig: Inject 2 mg Subcutaneous every 7 days   amLODIPine-valsartan (EXFORGE) 5-160 MG tablet 11/10/2023 at pm  No Yes   Sig: TAKE 1 TABLET EVERY DAY   empagliflozin (JARDIANCE) 25 MG TABS tablet 11/10/2023  No Yes   Sig: Take 1 tablet (25 mg) by mouth daily   glipiZIDE (GLUCOTROL XL) 10 MG 24 hr tablet 2023  No Yes   Sig: Take 2 tablets (20 mg) by mouth daily   insulin pen needle (31G X 8 MM) 31G X 8 MM miscellaneous   No No   Sig: Use 1 pen needles once a week or as directed.   metFORMIN (GLUCOPHAGE) 1000 MG tablet 2023 at am  No Yes   Sig: TAKE 1 TABLET TWICE A DAY WITH MEALS   rosuvastatin (CRESTOR) 20 MG tablet 11/10/2023  No Yes   Sig: Take 1 tablet (20 mg) by mouth daily   sildenafil (REVATIO) 20 MG tablet Not Taking  No No   Sig:  "2-5 tabs daily prn   Patient not taking: Reported on 11/11/2023      Facility-Administered Medications: None     Current Facility-Administered Medications   Medication Dose Route Frequency    amLODIPine  5 mg Oral Daily    azithromycin  250 mg Oral Daily    cefTRIAXone  2 g Intravenous Q24H    enoxaparin ANTICOAGULANT  40 mg Subcutaneous Q12H    insulin aspart  1-7 Units Subcutaneous TID AC    insulin aspart  1-5 Units Subcutaneous At Bedtime    sodium chloride (PF)  3 mL Intracatheter Q8H    sodium chloride bacteriostatic  10 mL Intravenous Once    valsartan  160 mg Oral Daily     Current Facility-Administered Medications   Medication Last Rate     Allergies   Allergies   Allergen Reactions    Lisinopril Cough    Penicillins Rash       Social History    reports that he has never smoked. He has never used smokeless tobacco. He reports current alcohol use. He reports that he does not use drugs.      Family History   I have reviewed this patient's family history and updated it with pertinent information if needed.  Family History   Problem Relation Age of Onset    Hypertension Mother     Diabetes Father     Lipids Father     Heart Disease Brother     Leukemia Brother           Review of Systems   A comprehensive review of system was performed and is negative other than that noted in the HPI or here.     Physical Exam   Vital Signs with Ranges  Temp:  [98.4  F (36.9  C)-99  F (37.2  C)] 99  F (37.2  C)  Pulse:  [] 90  Resp:  [20-22] 20  BP: (150-173)/() 153/87  SpO2:  [90 %-95 %] 93 %  Wt Readings from Last 4 Encounters:   11/12/23 (!) 174.7 kg (385 lb 3.2 oz)   07/12/23 (!) 173.8 kg (383 lb 3.2 oz)   04/12/23 (!) 172.5 kg (380 lb 6.4 oz)   08/23/22 (!) 178.8 kg (394 lb 3.2 oz)     I/O last 3 completed shifts:  In: 1320 [P.O.:400; I.V.:920]  Out: -       Vitals: BP (!) 153/87 (BP Location: Right arm)   Pulse 90   Temp 99  F (37.2  C) (Oral)   Resp 20   Ht (P) 1.854 m (6' 1\")   Wt (!) 174.7 kg (385 lb " "3.2 oz)   SpO2 93%   BMI (P) 50.82 kg/m      Physical Exam:   General - Alert and oriented to time place and person in no acute distress  Eyes - No scleral icterus  HEENT - Neck supple, moist mucous membranes  Cardiovascular -S1-S2 normal no murmur rub or gallop  Extremities - There is no pitting pedal edema  Respiratory -clear to auscultation  Skin - No pallor or cyanosis  Gastrointestinal - Non tender and non distended without rebound or guarding  Psych - Appropriate affect   Neurological - No gross motor neurological focal deficits    No lab results found in last 7 days.    Invalid input(s): \"TROPONINIES\"    Recent Labs   Lab 11/12/23  1249 11/12/23  0632 11/12/23  0222 11/11/23  2200 11/11/23  1833   WBC  --  11.0  --   --  11.1*   HGB  --  12.0*  --   --  12.5*   MCV  --  87  --   --  86   PLT  --  298  --   --  333   NA  --  138  --   --  136   POTASSIUM  --  3.8  --   --  3.7   CHLORIDE  --  103  --   --  101   CO2  --  23  --   --  21*   BUN  --  11.0  --   --  12.8   CR  --  0.72  --   --  0.75   GFRESTIMATED  --  >90  --   --  >90   ANIONGAP  --  12  --   --  14   COURTNEY  --  8.6  --   --  8.7   * 105* 93   < > 95   ALBUMIN  --  3.0*  --   --  3.0*   PROTTOTAL  --  6.3*  --   --  6.6   BILITOTAL  --  0.4  --   --  0.4   ALKPHOS  --  172*  --   --  184*   ALT  --  207*  --   --  229*   AST  --  135*  --   --  171*    < > = values in this interval not displayed.     Recent Labs   Lab Test 08/23/22  0915 01/11/22  0729   CHOL 117 219*   HDL 34* 40   LDL 50 118*   TRIG 164* 305*     Recent Labs   Lab 11/12/23  0632 11/11/23  1833   WBC 11.0 11.1*   HGB 12.0* 12.5*   HCT 36.5* 37.3*   MCV 87 86    333     No results for input(s): \"PH\", \"PHV\", \"PO2\", \"PO2V\", \"SAT\", \"PCO2\", \"PCO2V\", \"HCO3\", \"HCO3V\" in the last 168 hours.  No results for input(s): \"NTBNPI\", \"NTBNP\" in the last 168 hours.  No results for input(s): \"DD\" in the last 168 hours.  No results for input(s): \"SED\", \"CRP\" in the last 168 " "hours.  Recent Labs   Lab 11/12/23  0632 11/11/23  1833    333     No results for input(s): \"TSH\" in the last 168 hours.  No results for input(s): \"COLOR\", \"APPEARANCE\", \"URINEGLC\", \"URINEBILI\", \"URINEKETONE\", \"SG\", \"UBLD\", \"URINEPH\", \"PROTEIN\", \"UROBILINOGEN\", \"NITRITE\", \"LEUKEST\", \"RBCU\", \"WBCU\" in the last 168 hours.    Imaging:  Recent Results (from the past 48 hour(s))   CT CHEST PULMONARY ANGIO    Narrative    EXAM: CT CHEST PULMONARY ANGIO    DATE: 11/11/2023 10:29 AM    COMPARISON: None    CLINICAL DATA: Chest Pain.    TECHNIQUE: A CT angiogram (CTA) of the pulmonary arteries and chest was performed.  Specifically, preliminary unenhanced images were obtained through the pulmonary arteries.  Following this, during bolus infusion of intravenous contrast, thin-section contiguous transaxial images were obtained through the thorax.  In addition, multiplanar and three dimensional (3D) reformations through the pulmonary arterial tree were generated from the acquisition scanner and reviewed. A total of 100 cc of Omnipaque 350 were administered intravenously for this study.    FINDINGS:  No evidence of pulmonary embolus. The heart is normal in size. Moderate pericardial effusion. Dense consolidative density is present throughout the left lung. A few patchy consolidative opacities are present in the right lower lung. No significant pleural effusions.    Impression    IMPRESSION:      1. No evidence of pulmonary embolus.  2. Bilateral pneumonia.  3. Moderate pericardial effusion.      REPORT SIGNED BY DR. Sanjay Morocho   US Abdomen Limited    Narrative    EXAM: US ABDOMEN LIMITED  LOCATION: [facility]  DATE: 11/12/2023    INDICATION: assess for acute abnormality, evidence of fatty liver  COMPARISON: None.  TECHNIQUE: Limited abdominal ultrasound.    FINDINGS:    GALLBLADDER: No gallstones. No gallbladder wall thickening or pericholecystic fluid. Negative sonographic Gil " sign.    BILE DUCTS: No biliary dilatation.    LIVER: Enlarged echogenic liver. No focal mass.    RIGHT KIDNEY: No hydronephrosis.    PANCREAS: The visualized portions are normal.    No ascites.      Impression    IMPRESSION:  1.  Enlarged fatty liver.  2.  Normal gallbladder.       Echocardiogram Complete   Result Value    LVEF  60-65%    [facility]    [id number]  [id number]  [id number]  [id number]^BEATRIS^BARB^WALLY     [facility]  Echocardiography Laboratory  [address], [place] [address]     Name: TATY OLGUIN  MRN: [id number]  : 1980  Study Date: 2023 11:13 AM  Age: 43 yrs  Gender: Male  Patient Location: [facility]  Reason For Study: Pericardial Effusion  Ordering Physician: BARB SPEAR  Referring Physician: SYSTEM, PROVIDER NOT IN  Performed By: Tess Adkins     BSA: 2.8 m2  Height: 73 in  Weight: 385 lb  HR: 86  ______________________________________________________________________________  Procedure  Complete Echo Adult. Optison (NDC #[id number]) given intravenously.  ______________________________________________________________________________  Interpretation Summary     Left ventricular systolic function is normal.The visual ejection fraction is  60-65%.  There is mild (1+) tricuspid regurgitation.  Moderate pericardial effusion  There are no echocardiographic indications of cardiac tamponade.  ______________________________________________________________________________  Left Ventricle  The left ventricle is normal in size. There is normal left ventricular wall  thickness. Left ventricular systolic function is normal. The visual ejection  fraction is 60-65%. No regional wall motion abnormalities noted.     Right Ventricle  The right ventricle is normal size. The right ventricular systolic function is  normal.     Atria  The left atrium is mildly dilated. Right atrial size is normal.     Mitral Valve  There is trace mitral regurgitation.     Tricuspid  Valve  There is mild (1+) tricuspid regurgitation. Right ventricular systolic  pressure could not be approximated due to inadequate tricuspid regurgitation.     Aortic Valve  The aortic valve is not well visualized. No aortic regurgitation is present.  No aortic stenosis is present.     Pulmonic Valve  The pulmonic valve is not well visualized. There is no pulmonic valvular  stenosis.     Vessels  The aortic root is normal size. Normal size ascending aorta. Dilation of the  inferior vena cava is present with normal respiratory variation in diameter.  Normal pulmonary vein velocity.     Pericardium  Moderate pericardial effusion. There are no echocardiographic indications of  cardiac tamponade.     Rhythm  Sinus rhythm was noted.  ______________________________________________________________________________  MMode/2D Measurements & Calculations     IVSd: 1.1 cm  LVIDd: 5.5 cm  LVIDs: 3.8 cm  LVPWd: 1.0 cm  FS: 29.7 %  LV mass(C)d: 225.2 grams  LV mass(C)dI: 79.2 grams/m2  Ao root diam: 3.1 cm  asc Aorta Diam: 3.5 cm  LVOT diam: 2.4 cm  LVOT area: 4.4 cm2  Ao root diam index Ht(cm/m): 1.7  Ao root diam index BSA (cm/m2): 1.1  Asc Ao diam index BSA (cm/m2): 1.2  Asc Ao diam index Ht(cm/m): 1.9  LA Volume (BP): 102.0 ml     LA Volume Index (BP): 35.9 ml/m2  RWT: 0.37  TAPSE: 2.6 cm     Doppler Measurements & Calculations  MV E max scottie: 118.0 cm/sec  MV A max scottie: 108.0 cm/sec  MV E/A: 1.1  Ao V2 max: 181.3 cm/sec  Ao max P.0 mmHg  JORGE(V,D): 2.7 cm2  LV V1 max P.0 mmHg  LV V1 max: 112.1 cm/sec  LV V1 VTI: 21.9 cm  SV(LVOT): 95.9 ml  SI(LVOT): 33.8 ml/m2  PA V2 max: 101.1 cm/sec  PA max P.1 mmHg  PA acc time: 0.11 sec  AV Scottie Ratio (DI): 0.62  E/E' av.4  Lateral E/e': 12.1  Medial E/e': 16.7     ______________________________________________________________________________  Report approved by: Nadia Abdalla 2023 02:23 PM             Echo:  No results found for this or any previous visit  "(from the past 4320 hour(s)).    Clinically Significant Risk Factors Present on Admission              # Hypoalbuminemia: Lowest albumin = 3 g/dL at 11/12/2023  6:32 AM, will monitor as appropriate     # Hypertension: Noted on problem list     # DMII: A1C = N/A within past 6 months    # Severe Obesity: Estimated body mass index is 50.82 kg/m  (pended) as calculated from the following:    Height as of this encounter: (P) 1.854 m (6' 1\").    Weight as of this encounter: 174.7 kg (385 lb 3.2 oz).                          "

## 2023-11-13 ENCOUNTER — APPOINTMENT (OUTPATIENT)
Dept: CARDIOLOGY | Facility: CLINIC | Age: 43
DRG: 314 | End: 2023-11-13
Attending: INTERNAL MEDICINE
Payer: COMMERCIAL

## 2023-11-13 LAB
ALBUMIN SERPL BCG-MCNC: 3.3 G/DL (ref 3.5–5.2)
ALP SERPL-CCNC: 179 U/L (ref 40–129)
ALT SERPL W P-5'-P-CCNC: 216 U/L (ref 0–70)
ANION GAP SERPL CALCULATED.3IONS-SCNC: 9 MMOL/L (ref 7–15)
AST SERPL W P-5'-P-CCNC: 113 U/L (ref 0–45)
BILIRUB SERPL-MCNC: 0.4 MG/DL
BUN SERPL-MCNC: 9.5 MG/DL (ref 6–20)
CALCIUM SERPL-MCNC: 8.8 MG/DL (ref 8.6–10)
CHLORIDE SERPL-SCNC: 104 MMOL/L (ref 98–107)
CREAT SERPL-MCNC: 0.69 MG/DL (ref 0.67–1.17)
DEPRECATED HCO3 PLAS-SCNC: 24 MMOL/L (ref 22–29)
EGFRCR SERPLBLD CKD-EPI 2021: >90 ML/MIN/1.73M2
GLUCOSE BLDC GLUCOMTR-MCNC: 131 MG/DL (ref 70–99)
GLUCOSE BLDC GLUCOMTR-MCNC: 131 MG/DL (ref 70–99)
GLUCOSE BLDC GLUCOMTR-MCNC: 138 MG/DL (ref 70–99)
GLUCOSE BLDC GLUCOMTR-MCNC: 143 MG/DL (ref 70–99)
GLUCOSE BLDC GLUCOMTR-MCNC: 190 MG/DL (ref 70–99)
GLUCOSE SERPL-MCNC: 142 MG/DL (ref 70–99)
POTASSIUM SERPL-SCNC: 4.1 MMOL/L (ref 3.4–5.3)
PROT SERPL-MCNC: 6.6 G/DL (ref 6.4–8.3)
SODIUM SERPL-SCNC: 137 MMOL/L (ref 135–145)

## 2023-11-13 PROCEDURE — 75557 CARDIAC MRI FOR MORPH: CPT

## 2023-11-13 PROCEDURE — 93325 DOPPLER ECHO COLOR FLOW MAPG: CPT | Mod: 26 | Performed by: INTERNAL MEDICINE

## 2023-11-13 PROCEDURE — 99207 PR APP CREDIT; MD BILLING SHARED VISIT: CPT | Performed by: PHYSICIAN ASSISTANT

## 2023-11-13 PROCEDURE — 99232 SBSQ HOSP IP/OBS MODERATE 35: CPT | Mod: 25 | Performed by: PHYSICIAN ASSISTANT

## 2023-11-13 PROCEDURE — 75557 CARDIAC MRI FOR MORPH: CPT | Mod: 26 | Performed by: INTERNAL MEDICINE

## 2023-11-13 PROCEDURE — 210N000002 HC R&B HEART CARE

## 2023-11-13 PROCEDURE — 93321 DOPPLER ECHO F-UP/LMTD STD: CPT

## 2023-11-13 PROCEDURE — 99222 1ST HOSP IP/OBS MODERATE 55: CPT | Performed by: INTERNAL MEDICINE

## 2023-11-13 PROCEDURE — 99207 CV CARDIAC CATHERIZATION: CPT | Mod: GC | Performed by: INTERNAL MEDICINE

## 2023-11-13 PROCEDURE — 250N000011 HC RX IP 250 OP 636: Performed by: INTERNAL MEDICINE

## 2023-11-13 PROCEDURE — 250N000009 HC RX 250: Performed by: PHYSICIAN ASSISTANT

## 2023-11-13 PROCEDURE — C1769 GUIDE WIRE: HCPCS | Performed by: INTERNAL MEDICINE

## 2023-11-13 PROCEDURE — 250N000013 HC RX MED GY IP 250 OP 250 PS 637: Performed by: PHYSICIAN ASSISTANT

## 2023-11-13 PROCEDURE — 272N000001 HC OR GENERAL SUPPLY STERILE: Performed by: INTERNAL MEDICINE

## 2023-11-13 PROCEDURE — 80053 COMPREHEN METABOLIC PANEL: CPT | Performed by: HOSPITALIST

## 2023-11-13 PROCEDURE — 93321 DOPPLER ECHO F-UP/LMTD STD: CPT | Mod: 26 | Performed by: INTERNAL MEDICINE

## 2023-11-13 PROCEDURE — 250N000013 HC RX MED GY IP 250 OP 250 PS 637: Performed by: HOSPITALIST

## 2023-11-13 PROCEDURE — 36415 COLL VENOUS BLD VENIPUNCTURE: CPT | Performed by: HOSPITALIST

## 2023-11-13 PROCEDURE — 250N000011 HC RX IP 250 OP 636: Mod: JZ | Performed by: INTERNAL MEDICINE

## 2023-11-13 PROCEDURE — 93308 TTE F-UP OR LMTD: CPT | Mod: 26 | Performed by: INTERNAL MEDICINE

## 2023-11-13 PROCEDURE — 250N000013 HC RX MED GY IP 250 OP 250 PS 637: Performed by: INTERNAL MEDICINE

## 2023-11-13 PROCEDURE — 93325 DOPPLER ECHO COLOR FLOW MAPG: CPT

## 2023-11-13 PROCEDURE — 250N000011 HC RX IP 250 OP 636: Mod: JZ | Performed by: PHYSICIAN ASSISTANT

## 2023-11-13 PROCEDURE — 99152 MOD SED SAME PHYS/QHP 5/>YRS: CPT | Performed by: INTERNAL MEDICINE

## 2023-11-13 PROCEDURE — C1894 INTRO/SHEATH, NON-LASER: HCPCS | Performed by: INTERNAL MEDICINE

## 2023-11-13 RX ORDER — FENTANYL CITRATE 50 UG/ML
INJECTION, SOLUTION INTRAMUSCULAR; INTRAVENOUS
Status: DISCONTINUED | OUTPATIENT
Start: 2023-11-13 | End: 2023-11-13 | Stop reason: HOSPADM

## 2023-11-13 RX ORDER — OXYMETAZOLINE HYDROCHLORIDE 0.05 G/100ML
2 SPRAY NASAL 2 TIMES DAILY PRN
Status: DISCONTINUED | OUTPATIENT
Start: 2023-11-13 | End: 2023-11-14 | Stop reason: HOSPADM

## 2023-11-13 RX ORDER — HYDRALAZINE HYDROCHLORIDE 20 MG/ML
5-10 INJECTION INTRAMUSCULAR; INTRAVENOUS EVERY 6 HOURS PRN
Status: DISCONTINUED | OUTPATIENT
Start: 2023-11-13 | End: 2023-11-14 | Stop reason: HOSPADM

## 2023-11-13 RX ORDER — HYDRALAZINE HYDROCHLORIDE 20 MG/ML
10 INJECTION INTRAMUSCULAR; INTRAVENOUS EVERY 6 HOURS PRN
Status: DISCONTINUED | OUTPATIENT
Start: 2023-11-13 | End: 2023-11-13

## 2023-11-13 RX ORDER — GADOBUTROL 604.72 MG/ML
21 INJECTION INTRAVENOUS ONCE
Status: DISCONTINUED | OUTPATIENT
Start: 2023-11-13 | End: 2023-11-14

## 2023-11-13 RX ORDER — SODIUM CHLORIDE/ALOE VERA
GEL (GRAM) NASAL 4 TIMES DAILY
Status: DISCONTINUED | OUTPATIENT
Start: 2023-11-13 | End: 2023-11-14 | Stop reason: HOSPADM

## 2023-11-13 RX ORDER — OXYMETAZOLINE HYDROCHLORIDE 0.05 G/100ML
2 SPRAY NASAL 2 TIMES DAILY
Status: DISCONTINUED | OUTPATIENT
Start: 2023-11-13 | End: 2023-11-13

## 2023-11-13 RX ADMIN — AZITHROMYCIN DIHYDRATE 250 MG: 250 TABLET ORAL at 08:22

## 2023-11-13 RX ADMIN — VALSARTAN 160 MG: 160 TABLET, FILM COATED ORAL at 08:22

## 2023-11-13 RX ADMIN — HYDRALAZINE HYDROCHLORIDE 5 MG: 20 INJECTION INTRAMUSCULAR; INTRAVENOUS at 11:41

## 2023-11-13 RX ADMIN — Medication: at 20:47

## 2023-11-13 RX ADMIN — AMLODIPINE BESYLATE 5 MG: 5 TABLET ORAL at 08:23

## 2023-11-13 RX ADMIN — Medication: at 17:06

## 2023-11-13 RX ADMIN — OXYMETAZOLINE HYDROCHLORIDE 2 SPRAY: 0.05 SPRAY NASAL at 11:47

## 2023-11-13 RX ADMIN — ENOXAPARIN SODIUM 40 MG: 40 INJECTION SUBCUTANEOUS at 08:23

## 2023-11-13 RX ADMIN — CEFTRIAXONE SODIUM 2 G: 2 INJECTION, POWDER, FOR SOLUTION INTRAMUSCULAR; INTRAVENOUS at 11:25

## 2023-11-13 RX ADMIN — INSULIN ASPART 1 UNITS: 100 INJECTION, SOLUTION INTRAVENOUS; SUBCUTANEOUS at 17:06

## 2023-11-13 ASSESSMENT — ACTIVITIES OF DAILY LIVING (ADL)
ADLS_ACUITY_SCORE: 20

## 2023-11-13 NOTE — PROGRESS NOTES
Unfortunately, Geoffrey had a difficult time with breath holds and could not continue the exam. Discussed with Dr. Mcqueen and exam dc'd.

## 2023-11-13 NOTE — PROGRESS NOTES
Discussed with MRI attending.  There is a large pericardial effusion.  This eventually 2.5 cm at the apex and anteriorly.  I recommended diagnostic and therapeutic tap.  Risk and benefits explained to the patient and his significant other at bedside.  He is willing to proceed with pericardiocentesis.

## 2023-11-13 NOTE — PLAN OF CARE
Goal Outcome Evaluation:      Summary: Acute hypoxic respiratory failure due to CAP bilateral, transferred to [facility] from [place] due to possible need for cardiac intervention on pericardial effusion.    Cardiac MRI 11/13 for evaluation of pericardial effusion. May consider pericardiocentesis pending MRI results.    Orientation: A&Ox4    Vitals/Tele: vss  on 3L nc     IV Access/drains: Piv SL     Diet: Npo at 0400 for cardiac MRI     Mobility: independent     GI/: continent     Wound/Skin: WDL    Consults: cardiology    Discharge Plan: TBD     See Flow sheets for assessment

## 2023-11-13 NOTE — PROGRESS NOTES
Cardiology Invasive service.     I have personally examined the patient and reviewed his TTE images in the cath lab. Blood pressure 165/85. HR 99.  He tells me he feels better, denies chest pain  or worsening dyspnea. He is clearly not in tamponade.     We examined apical, subcostal and lateral windows.     The apical and lateral windows are small and the patient's obesity makes a subxiphoid approach technically very challenging . All in all I am concerned that  at this point in absence of hemodynamic instability the risk of percutaneous tap exceeds the potential benefit. His recent febrile illness is the most likely cause  for the effusion.       All in all I would favor delaying tap, repeat TTE in near future. It may resolve without intervention.  If there is clinical indication outside of hemodynamic instability to tap before  discharge, it may be better to discuss a pericardial window with surgeons.          Berna

## 2023-11-13 NOTE — PROGRESS NOTES
[facility]  Cardiology Progress Note    Date of Service (when I saw the patient): 11/13/2023  Summary: Geoffrey Wilson is a 43 year old male who was admitted on 11/11/2023 with bilateral CAP, hypoxic respiratory failure. Cardiology is consulted due to a moderate pericardial effusion noted on imaging.   Interval History   Dyspnea is better today. O2 sats 89 - 90 off O2 this morning. He continues to deny chest pain.   Assessment & Plan   Acute hypoxic respiratory failure secondary to bilateral CAP. On abx. Improving.   Moderate pericardial effusion. Had small pericardial effusion (< 1 cm circumferential) on imaging in 2018. No evidence of cardiac tamponade on imaging or clinically. Denies chest discomfort. No pericardial rub or EKG changes suggestive of pericarditis.   Obesity.  DM  Hypertension.  Dyslipidemia.  Fatty liver with abnormal liver enzymes.     Plan:  Cardiac MRI planned today for further evaluation of pericardial effusion and to evaluate for signs of pericardial hyperenhancement.   Further recommendations pending MRI results.     Bri Marie PA-C    Physical Exam   Temp: 97.8  F (36.6  C) Temp src: Oral BP: (!) 139/93 Pulse: 87   Resp: 19 SpO2: 92 % O2 Device: Nasal cannula Oxygen Delivery: 3 LPM  Vitals:    11/11/23 1727 11/12/23 0706 11/13/23 0605   Weight: (!) (P) 176 kg (388 lb) (!) 174.7 kg (385 lb 3.2 oz) (!) 171.6 kg (378 lb 3.2 oz)     Vital Signs with Ranges  Temp:  [97.8  F (36.6  C)-99.3  F (37.4  C)] 97.8  F (36.6  C)  Pulse:  [81-92] 87  Resp:  [18-20] 19  BP: (139-160)/(84-93) 139/93  SpO2:  [90 %-94 %] 92 %  11/08 0700 - 11/13 0659  In: 1920 [P.O.:1000; I.V.:920]  Out: -   Net: 1920  Constitutional: NAD.   Respiratory: CTAB.   Cardiovascular: RRR, s1s2, no murmur  GI: soft, BS+  Skin: warm, no rashes  Musculoskeletal: Moving all extremities  Neurologic: Alert, oriented x 3  Neuropsychiatric: Normal affect   Data   Results for orders placed or performed during  the hospital encounter of 23 (from the past 24 hour(s))   Echocardiogram Complete   Result Value Ref Range    LVEF  60-65%     [facility]    [id number]  [facility]  [id number]  [id number]^BEATRIS^BARB^WALLY     [facility]  Echocardiography Laboratory  [address] [place] [address]     Name: TATY OLGUIN  MRN: [id number]  : 1980  Study Date: 2023 11:13 AM  Age: 43 yrs  Gender: Male  Patient Location: [facility]  Reason For Study: Pericardial Effusion  Ordering Physician: BARB SPEAR  Referring Physician: SYSTEM, PROVIDER NOT IN  Performed By: Tess Adkins     BSA: 2.8 m2  Height: 73 in  Weight: 385 lb  HR: 86  ______________________________________________________________________________  Procedure  Complete Echo Adult. Optison (NDC #[id number]) given intravenously.  ______________________________________________________________________________  Interpretation Summary     Left ventricular systolic function is normal.The visual ejection fraction is  60-65%.  There is mild (1+) tricuspid regurgitation.  Moderate pericardial effusion  There are no echocardiographic indications of cardiac tamponade.  ______________________________________________________________________________  Left Ventricle  The left ventricle is normal in size. There is normal left ventricular wall  thickness. Left ventricular systolic function is normal. The visual ejection  fraction is 60-65%. No regional wall motion abnormalities noted.     Right Ventricle  The right ventricle is normal size. The right ventricular systolic function is  normal.     Atria  The left atrium is mildly dilated. Right atrial size is normal.     Mitral Valve  There is trace mitral regurgitation.     Tricuspid Valve  There is mild (1+) tricuspid regurgitation. Right ventricular systolic  pressure could not be approximated due to inadequate tricuspid regurgitation.     Aortic Valve  The aortic valve is not well  visualized. No aortic regurgitation is present.  No aortic stenosis is present.     Pulmonic Valve  The pulmonic valve is not well visualized. There is no pulmonic valvular  stenosis.     Vessels  The aortic root is normal size. Normal size ascending aorta. Dilation of the  inferior vena cava is present with normal respiratory variation in diameter.  Normal pulmonary vein velocity.     Pericardium  Moderate pericardial effusion. There are no echocardiographic indications of  cardiac tamponade.     Rhythm  Sinus rhythm was noted.  ______________________________________________________________________________  MMode/2D Measurements & Calculations     IVSd: 1.1 cm  LVIDd: 5.5 cm  LVIDs: 3.8 cm  LVPWd: 1.0 cm  FS: 29.7 %  LV mass(C)d: 225.2 grams  LV mass(C)dI: 79.2 grams/m2  Ao root diam: 3.1 cm  asc Aorta Diam: 3.5 cm  LVOT diam: 2.4 cm  LVOT area: 4.4 cm2  Ao root diam index Ht(cm/m): 1.7  Ao root diam index BSA (cm/m2): 1.1  Asc Ao diam index BSA (cm/m2): 1.2  Asc Ao diam index Ht(cm/m): 1.9  LA Volume (BP): 102.0 ml     LA Volume Index (BP): 35.9 ml/m2  RWT: 0.37  TAPSE: 2.6 cm     Doppler Measurements & Calculations  MV E max scottie: 118.0 cm/sec  MV A max scottie: 108.0 cm/sec  MV E/A: 1.1  Ao V2 max: 181.3 cm/sec  Ao max P.0 mmHg  JORGE(V,D): 2.7 cm2  LV V1 max P.0 mmHg  LV V1 max: 112.1 cm/sec  LV V1 VTI: 21.9 cm  SV(LVOT): 95.9 ml  SI(LVOT): 33.8 ml/m2  PA V2 max: 101.1 cm/sec  PA max P.1 mmHg  PA acc time: 0.11 sec  AV Scottie Ratio (DI): 0.62  E/E' av.4  Lateral E/e': 12.1  Medial E/e': 16.7     ______________________________________________________________________________  Report approved by: Nadia Abdalla 2023 02:23 PM         Glucose by meter   Result Value Ref Range    GLUCOSE BY METER POCT 134 (H) 70 - 99 mg/dL   EKG 12-lead, tracing only   Result Value Ref Range    Systolic Blood Pressure  mmHg    Diastolic Blood Pressure  mmHg    Ventricular Rate 85 BPM    Atrial Rate 85 BPM    CA  Interval 168 ms    QRS Duration 78 ms     ms    QTc 437 ms    P Axis 16 degrees    R AXIS 77 degrees    T Axis 61 degrees    Interpretation ECG       Sinus rhythm  Low voltage QRS  Cannot rule out Anteroseptal infarct , age undetermined  Abnormal ECG  No previous ECGs available     Glucose by meter   Result Value Ref Range    GLUCOSE BY METER POCT 160 (H) 70 - 99 mg/dL   Creatinine   Result Value Ref Range    Creatinine 0.71 0.67 - 1.17 mg/dL    GFR Estimate >90 >60 mL/min/1.73m2   Glucose by meter   Result Value Ref Range    GLUCOSE BY METER POCT 178 (H) 70 - 99 mg/dL   Glucose by meter   Result Value Ref Range    GLUCOSE BY METER POCT 131 (H) 70 - 99 mg/dL   Comprehensive metabolic panel   Result Value Ref Range    Sodium 137 135 - 145 mmol/L    Potassium 4.1 3.4 - 5.3 mmol/L    Carbon Dioxide (CO2) 24 22 - 29 mmol/L    Anion Gap 9 7 - 15 mmol/L    Urea Nitrogen 9.5 6.0 - 20.0 mg/dL    Creatinine 0.69 0.67 - 1.17 mg/dL    GFR Estimate >90 >60 mL/min/1.73m2    Calcium 8.8 8.6 - 10.0 mg/dL    Chloride 104 98 - 107 mmol/L    Glucose 142 (H) 70 - 99 mg/dL    Alkaline Phosphatase 179 (H) 40 - 129 U/L     (H) 0 - 45 U/L     (H) 0 - 70 U/L    Protein Total 6.6 6.4 - 8.3 g/dL    Albumin 3.3 (L) 3.5 - 5.2 g/dL    Bilirubin Total 0.4 <=1.2 mg/dL   Glucose by meter   Result Value Ref Range    GLUCOSE BY METER POCT 138 (H) 70 - 99 mg/dL       Medications      amLODIPine  5 mg Oral Daily    azithromycin  250 mg Oral Daily    cefTRIAXone  2 g Intravenous Q24H    enoxaparin ANTICOAGULANT  40 mg Subcutaneous Q12H    insulin aspart  1-7 Units Subcutaneous TID AC    insulin aspart  1-5 Units Subcutaneous At Bedtime    sodium chloride (PF)  3 mL Intracatheter Q8H    sodium chloride bacteriostatic  10 mL Intravenous Once    valsartan  160 mg Oral Daily

## 2023-11-13 NOTE — CODE/RAPID RESPONSE
[facility]   RRT/House Officer CHARLINE Progress Note  Date of Service: 11/13/2023   Time Called: 1124  RRT called for (per RN): new nosebleed    IMPRESSION & PLAN:  Assessment & Plan   Geoffrey Wilson is a 43 year old male w/ PMH of HTN, HLD, DM 2, transferred from [place] ED 11/24 admitted with CAP,  acute respiratory failure, and pericardial effusion with a recent viral URI.    RRT for new nosebleed. R side, trigger likely dry air + nasal canal use but un humidified x 1d. Get nosebleeds a few times a yr, uneventful. No assoc sx w this, but he blew nose at the start w clots expressed. No PTA blood thinner. NO anemia sx. spo2 reportedly 89% this am on NC, he denies current SOB. Note BP high during event too, 151/98 at the start, repeat 155/95 despite am meds. Gave 5mg hydralazine.  Sats 97% w mask at 2lpm, added humidity, Hr 84, Stable at end of RRT w nosebleed apparently controlled.  Wife in room updated.       Working diagnosis/Impression:  Right epistaxis, likely anterior, from dry air/O2 use  HTN , above goal  Acute Resp failure w B/L CAP as per admit  DM2.   Pericardial effusion,  pending MRI    Differential diagnosis considered following (not exclusive):    INTERVENTIONS:  - add humidity to O2- mask for now  - target Spo2<88% on RA  - Afrin nasal spray to Right x 2, prn avail  - hydralazine 5mg now for SBP>140 w epistaxis, or SBP>160 but w/o sx  - continues PTA valsartan 160, amlodipine 5 ,   - nasal sprays q 1 hr , + nasal saline gel to ant septum  - no blowing of nose  -manual pressure to nose if recurs , repeat Afrin  - temp hold of lovenox for now, ambulate TID for DVT ppx     At the end of the RRT, pt appears stable, nosebleed stopped w kleenex removed from nose, Afrin given    Disposition- remains in room w monitoring     Defer future cares to rounder/hospitalist attending provider    Code Status: Full Code    Allergies   Allergies   Allergen Reactions    Lisinopril Cough    Penicillins  Rash         Subjective:  Interval History     Other than nosebleed,  no new sx , hungry , ready for cardiac MRI (delaying until bleed stopped x 1 hr).     Exam::  Physical Exam   Vital Signs with Ranges:  Vitals:    11/13/23 0554 11/13/23 0605 11/13/23 0733 11/13/23 0800   BP: (!) 145/84  (!) 139/93    BP Location: Right arm  Right arm    Patient Position: Supine      Cuff Size: Adult Large      Pulse:   87    Resp: 18  19    Temp: 97.8  F (36.6  C)  97.8  F (36.6  C)    TempSrc: Oral  Oral    SpO2:   92% 93%   Weight:  (!) 171.6 kg (378 lb 3.2 oz)     Height:         Temp:  [97.8  F (36.6  C)-99.3  F (37.4  C)] 97.8  F (36.6  C)  Pulse:  [81-87] 87  Resp:  [18-19] 19  BP: (139-157)/(84-93) 139/93  SpO2:  [92 %-93 %] 93 %  I/O last 3 completed shifts:  In: 600 [P.O.:600]  Out: -     Lines, PIV   Constitutional: vs as above and/or per EMR  General:  adult pt lying in bed without acute distress  HEENT: NC/AT,  kleenex in R nares, no blood on it  Neck: w/o JVD, supple  CV: S1S2, w/o obvious murmur  Resp: on O2 via Mask at 2lpm, Spo2 97%, chest rise unlabored, lungs clr faint rhonchi, no rales, whz upper and lower lobes  GI: obese habitus,   Musculoskeletal: MAEW,  no edema  Skin: no obvious rashes on exposed skin  Lymph: defer  Neuro: non focal,   Psych:friendly, calm.     Labs/Imaging  Data     CBC with Diff:  Recent Labs   Lab Test 11/12/23  0632 11/11/23  1833 02/18/22  1615 08/24/16  0700   WBC 11.0 11.1*  --  9.3   HGB 12.0* 12.5* 15.3 11.9*   MCV 87 86  --  88    333  --  325        Comprehensive Metabolic Panel:  Recent Labs   Lab 11/13/23  1111 11/13/23  0807 11/13/23  0635 11/12/23  2108 11/12/23  1756 11/12/23  1249 11/12/23  0632 11/11/23  2200 11/11/23  1833   NA  --   --  137  --   --   --  138  --  136   POTASSIUM  --   --  4.1  --   --   --  3.8  --  3.7   CHLORIDE  --   --  104  --   --   --  103  --  101   CO2  --   --  24  --   --   --  23  --  21*   ANIONGAP  --   --  9  --   --   --  12  --   14   * 138* 142*   < >  --    < > 105*   < > 95   BUN  --   --  9.5  --   --   --  11.0  --  12.8   CR  --   --  0.69  --  0.71  --  0.72  --  0.75   GFRESTIMATED  --   --  >90  --  >90  --  >90  --  >90   COURTNEY  --   --  8.8  --   --   --  8.6  --  8.7   PROTTOTAL  --   --  6.6  --   --   --  6.3*  --  6.6   ALBUMIN  --   --  3.3*  --   --   --  3.0*  --  3.0*   BILITOTAL  --   --  0.4  --   --   --  0.4  --  0.4   ALKPHOS  --   --  179*  --   --   --  172*  --  184*   AST  --   --  113*  --   --   --  135*  --  171*   ALT  --   --  216*  --   --   --  207*  --  229*    < > = values in this interval not displayed.         IMAGING recent:   None for RRT    Reviewed Echocardiogram Complete  11/12/2023  Interpretation Summary  Left ventricular systolic function is normal.The visual ejection fraction is 60-65%. There is mild (1+) tricuspid regurgitation. Moderate pericardial effusion There are no echocardiographic indications of cardiac tamponade. _______    US Abdomen Limited 11/12/2023  IMPRESSION: 1.  Enlarged fatty liver. 2.  Normal gallbladder.     CT CHEST PULMONARY ANGIO: 11/11/2023  IMPRESSION: 1. No evidence of pulmonary embolus. 2. Bilateral pneumonia. 3. Moderate pericardial effusion.     Time Spent on this Encounter   I spent 30 minutes on the unit/floor managing the care of this patient and the nosebleed. Over 50% of my time was spent counseling the patient and/or coordinating care regarding services listed in this note.      [facility] House Officer/CHARLINE  Alex Rubio PA-C    [facility]  Securely message with the Vocera Web Console (learn more here)  Text page via [employer] Paging/[place]

## 2023-11-13 NOTE — PLAN OF CARE
Goal Outcome Evaluation:      Plan of Care Reviewed With: patient, spouse      Patient is up in the room independent had MRI this afternoon, tolerated food this evening gave insuline per sliding scale, wife at bedside, RA, blood pressure is elevated, continue to monitor.

## 2023-11-14 ENCOUNTER — APPOINTMENT (OUTPATIENT)
Dept: CARDIOLOGY | Facility: CLINIC | Age: 43
DRG: 314 | End: 2023-11-14
Attending: PHYSICIAN ASSISTANT
Payer: COMMERCIAL

## 2023-11-14 VITALS
RESPIRATION RATE: 18 BRPM | SYSTOLIC BLOOD PRESSURE: 148 MMHG | WEIGHT: 315 LBS | BODY MASS INDEX: 48.94 KG/M2 | HEART RATE: 82 BPM | OXYGEN SATURATION: 94 % | DIASTOLIC BLOOD PRESSURE: 85 MMHG | TEMPERATURE: 99.1 F

## 2023-11-14 LAB
ALBUMIN SERPL BCG-MCNC: 3.3 G/DL (ref 3.5–5.2)
ALP SERPL-CCNC: 171 U/L (ref 40–129)
ALT SERPL W P-5'-P-CCNC: 202 U/L (ref 0–70)
ANION GAP SERPL CALCULATED.3IONS-SCNC: 9 MMOL/L (ref 7–15)
AST SERPL W P-5'-P-CCNC: 101 U/L (ref 0–45)
ATRIAL RATE - MUSE: 85 BPM
BASOPHILS # BLD AUTO: NORMAL 10*3/UL
BASOPHILS # BLD MANUAL: 0 10E3/UL (ref 0–0.2)
BASOPHILS NFR BLD AUTO: NORMAL %
BASOPHILS NFR BLD MANUAL: 0 %
BILIRUB DIRECT SERPL-MCNC: <0.2 MG/DL (ref 0–0.3)
BILIRUB SERPL-MCNC: 0.3 MG/DL
BUN SERPL-MCNC: 12.7 MG/DL (ref 6–20)
CALCIUM SERPL-MCNC: 9.2 MG/DL (ref 8.6–10)
CHLORIDE SERPL-SCNC: 105 MMOL/L (ref 98–107)
CREAT SERPL-MCNC: 0.77 MG/DL (ref 0.67–1.17)
DEPRECATED HCO3 PLAS-SCNC: 25 MMOL/L (ref 22–29)
DIASTOLIC BLOOD PRESSURE - MUSE: NORMAL MMHG
EGFRCR SERPLBLD CKD-EPI 2021: >90 ML/MIN/1.73M2
EOSINOPHIL # BLD AUTO: NORMAL 10*3/UL
EOSINOPHIL # BLD MANUAL: 0.5 10E3/UL (ref 0–0.7)
EOSINOPHIL NFR BLD AUTO: NORMAL %
EOSINOPHIL NFR BLD MANUAL: 5 %
ERYTHROCYTE [DISTWIDTH] IN BLOOD BY AUTOMATED COUNT: 13.7 % (ref 10–15)
GLUCOSE BLDC GLUCOMTR-MCNC: 139 MG/DL (ref 70–99)
GLUCOSE BLDC GLUCOMTR-MCNC: 160 MG/DL (ref 70–99)
GLUCOSE BLDC GLUCOMTR-MCNC: 186 MG/DL (ref 70–99)
GLUCOSE SERPL-MCNC: 143 MG/DL (ref 70–99)
HCT VFR BLD AUTO: 42.1 % (ref 40–53)
HGB BLD-MCNC: 13.7 G/DL (ref 13.3–17.7)
IMM GRANULOCYTES # BLD: NORMAL 10*3/UL
IMM GRANULOCYTES NFR BLD: NORMAL %
INTERPRETATION ECG - MUSE: NORMAL
LYMPHOCYTES # BLD AUTO: NORMAL 10*3/UL
LYMPHOCYTES # BLD MANUAL: 2.6 10E3/UL (ref 0.8–5.3)
LYMPHOCYTES NFR BLD AUTO: NORMAL %
LYMPHOCYTES NFR BLD MANUAL: 27 %
MCH RBC QN AUTO: 28.7 PG (ref 26.5–33)
MCHC RBC AUTO-ENTMCNC: 32.5 G/DL (ref 31.5–36.5)
MCV RBC AUTO: 88 FL (ref 78–100)
METAMYELOCYTES # BLD MANUAL: 0.4 10E3/UL
METAMYELOCYTES NFR BLD MANUAL: 4 %
MONOCYTES # BLD AUTO: NORMAL 10*3/UL
MONOCYTES # BLD MANUAL: 0.4 10E3/UL (ref 0–1.3)
MONOCYTES NFR BLD AUTO: NORMAL %
MONOCYTES NFR BLD MANUAL: 4 %
NEUTROPHILS # BLD AUTO: NORMAL 10*3/UL
NEUTROPHILS # BLD MANUAL: 5.9 10E3/UL (ref 1.6–8.3)
NEUTROPHILS NFR BLD AUTO: NORMAL %
NEUTROPHILS NFR BLD MANUAL: 60 %
NRBC # BLD AUTO: 0 10E3/UL
NRBC # BLD AUTO: 0.1 10E3/UL
NRBC BLD AUTO-RTO: 0 /100
NRBC BLD MANUAL-RTO: 1 %
P AXIS - MUSE: 16 DEGREES
PLAT MORPH BLD: ABNORMAL
PLATELET # BLD AUTO: 394 10E3/UL (ref 150–450)
POTASSIUM SERPL-SCNC: 4.5 MMOL/L (ref 3.4–5.3)
PR INTERVAL - MUSE: 168 MS
PROT SERPL-MCNC: 6.7 G/DL (ref 6.4–8.3)
QRS DURATION - MUSE: 78 MS
QT - MUSE: 368 MS
QTC - MUSE: 437 MS
R AXIS - MUSE: 77 DEGREES
RBC # BLD AUTO: 4.77 10E6/UL (ref 4.4–5.9)
RBC MORPH BLD: ABNORMAL
SODIUM SERPL-SCNC: 139 MMOL/L (ref 135–145)
SYSTOLIC BLOOD PRESSURE - MUSE: NORMAL MMHG
T AXIS - MUSE: 61 DEGREES
VENTRICULAR RATE- MUSE: 85 BPM
WBC # BLD AUTO: 9.8 10E3/UL (ref 4–11)

## 2023-11-14 PROCEDURE — 80053 COMPREHEN METABOLIC PANEL: CPT | Performed by: INTERNAL MEDICINE

## 2023-11-14 PROCEDURE — 255N000002 HC RX 255 OP 636: Performed by: INTERNAL MEDICINE

## 2023-11-14 PROCEDURE — 99222 1ST HOSP IP/OBS MODERATE 55: CPT | Mod: FS | Performed by: NURSE PRACTITIONER

## 2023-11-14 PROCEDURE — 99233 SBSQ HOSP IP/OBS HIGH 50: CPT | Mod: 25 | Performed by: INTERNAL MEDICINE

## 2023-11-14 PROCEDURE — C8924 2D TTE W OR W/O FOL W/CON,FU: HCPCS

## 2023-11-14 PROCEDURE — 250N000013 HC RX MED GY IP 250 OP 250 PS 637: Performed by: HOSPITALIST

## 2023-11-14 PROCEDURE — 93321 DOPPLER ECHO F-UP/LMTD STD: CPT | Mod: 26 | Performed by: INTERNAL MEDICINE

## 2023-11-14 PROCEDURE — 93325 DOPPLER ECHO COLOR FLOW MAPG: CPT

## 2023-11-14 PROCEDURE — 36415 COLL VENOUS BLD VENIPUNCTURE: CPT | Performed by: INTERNAL MEDICINE

## 2023-11-14 PROCEDURE — 85014 HEMATOCRIT: CPT | Performed by: INTERNAL MEDICINE

## 2023-11-14 PROCEDURE — 85007 BL SMEAR W/DIFF WBC COUNT: CPT | Performed by: INTERNAL MEDICINE

## 2023-11-14 PROCEDURE — 82248 BILIRUBIN DIRECT: CPT | Performed by: INTERNAL MEDICINE

## 2023-11-14 PROCEDURE — 93308 TTE F-UP OR LMTD: CPT | Mod: 26 | Performed by: INTERNAL MEDICINE

## 2023-11-14 PROCEDURE — 250N000011 HC RX IP 250 OP 636: Mod: JZ | Performed by: INTERNAL MEDICINE

## 2023-11-14 PROCEDURE — 93325 DOPPLER ECHO COLOR FLOW MAPG: CPT | Mod: 26 | Performed by: INTERNAL MEDICINE

## 2023-11-14 PROCEDURE — 250N000013 HC RX MED GY IP 250 OP 250 PS 637: Performed by: PHYSICIAN ASSISTANT

## 2023-11-14 PROCEDURE — 250N000013 HC RX MED GY IP 250 OP 250 PS 637: Performed by: INTERNAL MEDICINE

## 2023-11-14 PROCEDURE — 99238 HOSP IP/OBS DSCHRG MGMT 30/<: CPT | Performed by: INTERNAL MEDICINE

## 2023-11-14 RX ORDER — PANTOPRAZOLE SODIUM 40 MG/1
40 TABLET, DELAYED RELEASE ORAL
Status: DISCONTINUED | OUTPATIENT
Start: 2023-11-14 | End: 2023-11-14 | Stop reason: HOSPADM

## 2023-11-14 RX ORDER — IBUPROFEN 600 MG/1
600 TABLET, FILM COATED ORAL 3 TIMES DAILY
Qty: 42 TABLET | Refills: 1 | Status: SHIPPED | OUTPATIENT
Start: 2023-11-14 | End: 2024-04-24

## 2023-11-14 RX ORDER — CEFUROXIME AXETIL 500 MG/1
500 TABLET ORAL 2 TIMES DAILY
Qty: 12 TABLET | Refills: 0 | Status: SHIPPED | OUTPATIENT
Start: 2023-11-15 | End: 2023-11-21

## 2023-11-14 RX ORDER — IBUPROFEN 600 MG/1
600 TABLET, FILM COATED ORAL 3 TIMES DAILY
Status: DISCONTINUED | OUTPATIENT
Start: 2023-11-14 | End: 2023-11-14 | Stop reason: HOSPADM

## 2023-11-14 RX ORDER — PANTOPRAZOLE SODIUM 40 MG/1
40 TABLET, DELAYED RELEASE ORAL
Qty: 30 TABLET | Refills: 1 | Status: SHIPPED | OUTPATIENT
Start: 2023-11-15 | End: 2023-12-12

## 2023-11-14 RX ORDER — COLCHICINE 0.6 MG
0.3 TABLET ORAL 2 TIMES DAILY
Status: DISCONTINUED | OUTPATIENT
Start: 2023-11-14 | End: 2023-11-14 | Stop reason: HOSPADM

## 2023-11-14 RX ORDER — AZITHROMYCIN 250 MG/1
250 TABLET, FILM COATED ORAL DAILY
Qty: 1 TABLET | Refills: 0 | Status: SHIPPED | OUTPATIENT
Start: 2023-11-16 | End: 2023-11-14

## 2023-11-14 RX ORDER — COLCHICINE 0.6 MG/1
0.3 TABLET ORAL 2 TIMES DAILY
Qty: 30 TABLET | Refills: 0 | Status: SHIPPED | OUTPATIENT
Start: 2023-11-14 | End: 2023-12-12

## 2023-11-14 RX ADMIN — HUMAN ALBUMIN MICROSPHERES AND PERFLUTREN 9 ML: 10; .22 INJECTION, SOLUTION INTRAVENOUS at 08:33

## 2023-11-14 RX ADMIN — VALSARTAN 160 MG: 160 TABLET, FILM COATED ORAL at 08:59

## 2023-11-14 RX ADMIN — COLCHICINE 0.3 MG: 0.6 TABLET ORAL at 11:53

## 2023-11-14 RX ADMIN — INSULIN ASPART 1 UNITS: 100 INJECTION, SOLUTION INTRAVENOUS; SUBCUTANEOUS at 13:35

## 2023-11-14 RX ADMIN — AMLODIPINE BESYLATE 5 MG: 5 TABLET ORAL at 08:58

## 2023-11-14 RX ADMIN — CEFTRIAXONE SODIUM 2 G: 2 INJECTION, POWDER, FOR SOLUTION INTRAMUSCULAR; INTRAVENOUS at 10:00

## 2023-11-14 RX ADMIN — IBUPROFEN 600 MG: 600 TABLET ORAL at 09:59

## 2023-11-14 RX ADMIN — INSULIN ASPART 1 UNITS: 100 INJECTION, SOLUTION INTRAVENOUS; SUBCUTANEOUS at 09:00

## 2023-11-14 RX ADMIN — AZITHROMYCIN DIHYDRATE 250 MG: 250 TABLET ORAL at 08:59

## 2023-11-14 RX ADMIN — Medication: at 09:00

## 2023-11-14 RX ADMIN — PANTOPRAZOLE SODIUM 40 MG: 40 TABLET, DELAYED RELEASE ORAL at 09:59

## 2023-11-14 RX ADMIN — IBUPROFEN 600 MG: 600 TABLET ORAL at 13:34

## 2023-11-14 RX ADMIN — Medication: at 13:34

## 2023-11-14 ASSESSMENT — ACTIVITIES OF DAILY LIVING (ADL)
ADLS_ACUITY_SCORE: 20

## 2023-11-14 NOTE — CARE PLAN
S: AD, 43 year-old male admitted on 11/11/23 for pneumonia and pericardial effusion.     B: Pertinent health history includes; obesity, hypertension, hyperlipidemia, and type 2 diabetes mellitus.    A:  Neuro: alert and oriented x4  Resp: lungs are clear in all fields, but slightly diminished. Patient expressed dyspnea on exertion. He used 3L ox oxygen via nasal cannula overnight but has not needed any today.  Cardiac: apical pulse is regular, heart sounds are muffled. Radial and dorsalis pulses are equal bilaterally. Due to the minimal access point for a percutaneous pericardiocentesis the patient will be discharged home and managed with colchicine and ibuprofen. Follow-up imaging will be repeated in 2-4 weeks.  GI/: patient has had multiple occurences of diarrhea due to the antibiotic he is on.  Musculoskeletal: patient is independent and moves well.  Skin: dry and intact.  IV: removed     R: Patient is discharging this afternoon. Patient education has been completed and he acknowledged understanding of teaching. He will be leaving with his wife.

## 2023-11-14 NOTE — PHARMACY
Prescriber Notification Note    The pharmacist has communicated with this patient's provider regarding a concern or therapy recommendation.    Notified Person: Bri Marie  Date/Time of Notification: 0930 11/14/23  Interaction:  Text message  Concern/Recommendation: colchicine 0.6mg BID ordered.  Has a significant drug interaction with azithromycin (azithromycin can increase levels of colchcine.) Is scheduled to end tomorrow but drug can hang around for total of 7-10 days.  Rec'd decreasing to 0.3mg until azithromycin clears.      Comments/Additional Details: Agreed with recommendation, changed dose. Follow up when ok to increase back to 0.6mg.     Moriah Amezquita, PharmD

## 2023-11-14 NOTE — PLAN OF CARE
Goal Outcome Evaluation:    Pt here w pericardial effusion.  A&Ox4  Tele SR 1* AVB. Denies CP  LS diminished. CABALLERO  VSS BP elevated WDL. Requiring 3 L02 via NC.   Up independent in room  GI/: Voiding spontaneously. Diarrhea (pt on abx). 0200 BG at 139.   MRSA precautions maintained.   Plan: US abdomen, Future Repeat echo.

## 2023-11-14 NOTE — PROGRESS NOTES
[facility]    Internal Medicine Hospitalist Progress Note  11/13/2023  I evaluated patient on the above date.    Dilip Wright Jr., MD  [phone] (p)  Text Page  Vocera        Assessment & Plan New actions/orders today (11/13/2023) are underlined. All lab results in the assessment and plan were reviewed.    Geoffrey Wilson is a 43 year old male with past medical history of hypertension, hyperlipidemia and type 2 diabetes that is well managed who was admitted from [place] ER on 11/11/2023 for management of community-acquired pneumonia and a pericardial effusion without initial evidence of tamponade.    PTA, had 1 week history of viral URI symptoms, negative for COVID on 11/7/2023  Presented to [place] ED day of admission 11/11/2023 with a 3-day history of worsening dyspnea on exertion and fatigue. Some shortness of breath at rest but significantly exacerbated with minimal activity.  Occasional cough minimal sputum production at this point.   He did note he coughed up some blood earlier in the day, but this has not persisted.  No chest pain. Appetite had been poor but no associated nausea or vomiting. In ED, was afebrile, tachycardic with heart rates 110, blood pressure stable, O2 sats low 90s.  WBC 12.0, lactate 1.4; lytes renal function and hemoglobin all stable.  LFTs mildly elevated.  CT chest was negative for PE; showed dense left lower lobe and right lower lobe consolidations.; also found to have a moderate pericardial effusion.     Transferred to [facility] 11/11/2023 for management.        Acute hypoxic respiratory failure secondary to bilateral CAP, organism unspecified.  Recent viral URI.  * Initial presentation as above. BC's from 11/11 [facility]. Started on azithromycin and ceftriaxone in the ED.  * Transferred to [facility] 11/11. Procalcitonin 0.61, lactate normal, WBC 11.1, viral panel negative on admit.  * On 11/13, on RA.  - Continue azithromycin (started 11/11) and  ceftriaxone (stared 11/11).  - Continue to monitor cultures    Large pericardial effusion.  Prior history of small pericardial in 2018  * Stress echo done on 9/2018 showed small pericardial effusion.  * Last echo done on 10/10/2018 showed small pericardial effusion which was less than 1 cm in circumferential.  * Initial presentation as above.  * On 11/12, echo showed LVEF 60-65%; mild TR; moderate pericardial effusion, no signs of tamponade. Cardiology consulted on admit.   * On 11/13, cMRI showed normal LV and RV systolic functions; large pericardial effusion, no chamber collapse noted. Underwent diagnostic and therapeutic pericardiocentesis; however interventionalist felt benefits were outweighed by risks as it was felt it would be difficult given pt's obesity.  - Continue to monitor clinically.  - Follow-up echo at a later date.  - Appreciate Cardiology help.    Epistaxis.  * RRT called for epistaxis 11/13. Improved with pressure with tissues and Afrin nasal spray.  - Continue to monitor.    Hypertension (benign essential).  Hyperlipidemia.  [PTA: amlodipine-valsartan 5-160 mg daily.]  - Continue amlodipine 5 mg daily; valsartan 160 mg daily.    DM II, well managed.  [PTA: empagliflozin 25 mg daily; glipizide 20 mg daily; metformin 1000 mg BID; semaglutide 2 mg subcutaneous q7d on Tues.]  * A1c 6.9 in 7/2023.   * PTA meds held on admit.  - Continue to hold PTA meds for now.  - Continue aspart ISS (medium).    Abnormal LFTs likely due to enlarged fatty liver.  * Initial presentation as above. LFT's 11/11 showed , , , tbili normal.  * On admit, noted no prior record of LFTs in [facility] or Care Everywhere ([facility] or [facility]).  Unclear if findings represent acute change versus chronic abnormality. Concern for possible component of fatty liver given BMI vs due to Ozempic use vs due to statin vs due to other etiology?  * US of the abdomen 11/12 showed fatty liver and normal  "gallbladder.  Recent Labs   Lab 11/13/23  0635 11/12/23  1756 11/12/23  0632 11/11/23  1833   ALBUMIN 3.3*  --  3.0* 3.0*   BILITOTAL 0.4  --  0.4 0.4   *  --  207* 229*   *  --  135* 171*   ALKPHOS 179*  --  172* 184*   CR 0.69 0.71 0.72 0.75   - Continue to monitor LFT's.  - Patient instructed to lose weight and follow as outpatient with his primary care physician for repeat LFTs.    Chronic anemia.  * Last hemoglobin 1 year back was 12.5.  * Hgb 12.5 on admit.  Recent Labs   Lab 11/12/23  0632 11/11/23  1833   HGB 12.0* 12.5*   - Continue to monitor CBC - repeat in am.    Severe obesity.  * Body mass index is 49.9 kg/m  (pended).  - Needs to pursue aggressive dietary and lifestyle modifications.  - Resume semaglutide at discharge.         Clinically Significant Risk Factors              # Hypoalbuminemia: Lowest albumin = 3 g/dL at 11/12/2023  6:32 AM, will monitor as appropriate     # Hypertension: Noted on problem list       # DMII: A1C = N/A within past 6 months, PRESENT ON ADMISSION  # Severe Obesity: Estimated body mass index is 49.9 kg/m  (pended) as calculated from the following:    Height as of this encounter: (P) 1.854 m (6' 1\").    Weight as of this encounter: 171.6 kg (378 lb 3.2 oz)., PRESENT ON ADMISSION              COVID-19 testing.  COVID-19 PCR Results           No data to display              COVID-19 Antibody Results, Testing for Immunity           No data to display                Diet: Combination Diet Moderate Consistent Carb (60 g CHO per Meal) Diet; No Caffeine Diet    Prophylaxis: PCD's, ambulation.   English Catheter: Not present  Lines: None     Code Status: Full Code    Disposition Plan   Expected discharge: 1-2d recommended to prior living arrangement pending above.  Entered: Dilip Wright MD 11/13/2023, 3:41 PM         Interval History   Doing better overall.  Off O2.    -Data reviewed today: I reviewed all new labs and imaging over the last 24 hours. I personally " "reviewed no images or EKG's today.    Physical Exam    , Blood pressure (!) 147/82, pulse 75, temperature 97.8  F (36.6  C), temperature source Oral, resp. rate 20, height (P) 1.854 m (6' 1\"), weight (!) 171.6 kg (378 lb 3.2 oz), SpO2 92%. O2 Device: Nasal cannula Oxygen Delivery: 2 LPM  Vitals:    11/11/23 1727 11/12/23 0706 11/13/23 0605   Weight: (!) (P) 176 kg (388 lb) (!) 174.7 kg (385 lb 3.2 oz) (!) 171.6 kg (378 lb 3.2 oz)     Vital Signs with Ranges  Temp:  [97.8  F (36.6  C)-99.3  F (37.4  C)] 97.8  F (36.6  C)  Pulse:  [75-87] 75  Resp:  [18-20] 20  BP: (139-157)/(82-98) 147/82  SpO2:  [88 %-93 %] 92 %  Patient Vitals for the past 24 hrs:   BP Temp Temp src Pulse Resp SpO2 Weight   11/13/23 1445 -- -- -- -- -- 92 % --   11/13/23 1230 (!) 147/82 -- -- 75 20 90 % --   11/13/23 1210 -- -- -- -- -- 91 % --   11/13/23 1200 (!) 146/93 -- -- 86 -- (!) 88 % --   11/13/23 1143 (!) 155/95 -- -- 86 -- 90 % --   11/13/23 1130 (!) 151/98 -- -- 85 -- (!) 89 % --   11/13/23 1100 -- -- -- -- -- 90 % --   11/13/23 0800 -- -- -- -- -- 93 % --   11/13/23 0733 (!) 139/93 97.8  F (36.6  C) Oral 87 19 92 % --   11/13/23 0605 -- -- -- -- -- -- (!) 171.6 kg (378 lb 3.2 oz)   11/13/23 0554 (!) 145/84 97.8  F (36.6  C) Oral -- 18 -- --   11/13/23 0222 139/85 99.3  F (37.4  C) Oral -- 18 -- --   11/12/23 2100 (!) 157/86 -- -- 81 18 92 % --     I/O's Last 24 hours  I/O last 3 completed shifts:  In: 3 [I.V.:3]  Out: -     Constitutional: Awake, alert, oriented, pleasant.  Respiratory: Diminished in bases. No crackles or wheezes.  Cardiovascular: RRR, no m/r/g.  GI:   Skin/Integumen:   Other:        Data    Labs reviewed.  Recent Labs   Lab 11/13/23  1111 11/13/23  0807 11/13/23  0635 11/12/23  2108 11/12/23  1756 11/12/23  1249 11/12/23  0632 11/11/23  2200 11/11/23  1833   WBC  --   --   --   --   --   --  11.0  --  11.1*   HGB  --   --   --   --   --   --  12.0*  --  12.5*   MCV  --   --   --   --   --   --  87  --  86   PLT  --   " "--   --   --   --   --  298  --  333   NA  --   --  137  --   --   --  138  --  136   POTASSIUM  --   --  4.1  --   --   --  3.8  --  3.7   CHLORIDE  --   --  104  --   --   --  103  --  101   CO2  --   --  24  --   --   --  23  --  21*   BUN  --   --  9.5  --   --   --  11.0  --  12.8   CR  --   --  0.69  --  0.71  --  0.72  --  0.75   ANIONGAP  --   --  9  --   --   --  12  --  14   COURTNEY  --   --  8.8  --   --   --  8.6  --  8.7   * 138* 142*   < >  --    < > 105*   < > 95   ALBUMIN  --   --  3.3*  --   --   --  3.0*  --  3.0*   PROTTOTAL  --   --  6.6  --   --   --  6.3*  --  6.6   BILITOTAL  --   --  0.4  --   --   --  0.4  --  0.4   ALKPHOS  --   --  179*  --   --   --  172*  --  184*   ALT  --   --  216*  --   --   --  207*  --  229*   AST  --   --  113*  --   --   --  135*  --  171*    < > = values in this interval not displayed.     Recent Labs   Lab Test 23  1833   TROPONIN T HIGH SENSITIVITY 12     Recent Labs   Lab 23  1111 23  0807 23  0635 23  0213 23  2108 23  1649   * 138* 142* 131* 178* 160*      Recent Labs   Lab Test 23  0813 23  0826   A1C 6.9* 7.4*        No results for input(s): \"INR\", \"[id number]\" in the last 168 hours.  Recent Labs   Lab 23  0632 23  1833   WBC 11.0 11.1*   LACT  --  1.0   PCAL  --  0.61*       MICRO:  CULTURES (INCLUDING BLOOD AND URINE):  Recent Labs   Lab 23  7424   CULTURE No growth after 1 day  No growth after 1 day       Recent Results (from the past 24 hour(s))   MR Cardiac w/o Contrast    Narrative                                                     [facility]                                                     CMR Report      MRN:              [id number]                                  Name:        TATY OLGUIN NIVIA                                  :             1980-Jul-15                                  Scan Date:                                     "     Electronically signed by Nasim Mcqueen 2023-Nov-13 14:24:55    SUMMARY   ==========================================================================================================    Clinical history: Pericardial effusion  Comparison CMR: none    Limited study- patient could not tolerate due to shortness of breath    1. The LV is normal in cavity size and wall thickness. The global systolic function is normal. The LVEF  visually is 60-65 %. There are no regional wall motion abnormalities.    2. The RV is normal in cavity size. The global systolic function is normal.     3. There is large pericardial effusion. It measures 2.4 cm over the RV free wall. No chamber collapse  noted.      CONCLUSIONS:   Limited study    1. Normal LV and RV systolic functions.  2. Large pericardial effusion. No chamber collapse noted.    Discussed with inpatient cardiology team.    CORE EXAM   ==========================================================================================================    ANATOMY   ----------------------------------------------------------------------------------------      PERICARDIUM       ----------------------------------------------          EFFUSION:  LARGE        PLEURAL EFFUSION       ----------------------------------------------   SMALL RIGHT, SMALL LEFT         SCAN INFO   ==========================================================================================================    GENERAL   ----------------------------------------------------------------------------------------      CONTRAST AGENT       ----------------------------------------------          GD CONCENTRATION:  0.5 M        VITALS       ----------------------------------------------          HEIGHT:  73.0 in          HEIGHT:  185.4 cm          WEIGHT:  378.0 lbs          WEIGHT:  171.5 kgs          BSA:  2.82 m^2        SETUP       ----------------------------------------------          REFERRING PHYSICIAN:  PROVIDER SYSTEM           ATTENDING PHYSICIAN:  BARB GUERRA   ----------------------------------------------------------------------------------------      CPT Codes      ICD10 Codes      Report generated by [employer], a product of [employer]       Medications   All medications were reviewed.    Infusions:    Scheduled Medications:   amLODIPine  5 mg Oral Daily    azithromycin  250 mg Oral Daily    cefTRIAXone  2 g Intravenous Q24H    [Held by provider] enoxaparin ANTICOAGULANT  40 mg Subcutaneous Q12H    gadobutrol  21 mL Intravenous Once    insulin aspart  1-7 Units Subcutaneous TID AC    insulin aspart  1-5 Units Subcutaneous At Bedtime    saline nasal   Each Nare 4x Daily    sodium chloride (PF)  3 mL Intracatheter Q8H    sodium chloride (PF)   mL Intravenous Once    sodium chloride bacteriostatic  10 mL Intravenous Once    valsartan  160 mg Oral Daily     PRN Medications:  acetaminophen **OR** acetaminophen, glucose **OR** dextrose **OR** glucagon, fentaNYL (PF), hydrALAZINE, HOLD MEDICATION, lidocaine 4%, lidocaine (buffered or not buffered), melatonin, midazolam, ondansetron **OR** ondansetron, oxymetazoline, sodium chloride, sodium chloride (PF), sodium chloride (PF)

## 2023-11-14 NOTE — DISCHARGE SUMMARY
[facility]  Discharge Summary        Geoffrey Wilson MRN# [id number]   YOB: 1980 Age: 43 year old     Date of Admission: 11/11/2023  Date of Discharge: 11/14/2023  Admitting Physician: Yolanda Sanchez DO  Discharge Physician: Dilip Wright MD     Primary Provider: David Deutsch  Primary Care Physician Phone Number: [phone]         Discharge Diagnoses:   1. Acute hypoxic respiratory failure secondary to bilateral CAP, organism unspecified.  2. Large pericardial effusion.  3. Epistaxis.  4. Abnormal LFTs likely due to enlarged fatty liver.  5. Anemia, question chronic or dilutional.  6. Recent viral URI.  7. Prior history of small pericardial in 2018.        Other Chronic Medical Problems:      1. DM II.  2.Hypertension (benign essential).  3. Hyperlipidemia.  4. Severe obesity.       Allergies:         Allergies   Allergen Reactions    Lisinopril Cough    Penicillins Rash           Discharge Medications:        Current Discharge Medication List        START taking these medications    Details   cefuroxime (CEFTIN) 500 MG tablet Take 1 tablet (500 mg) by mouth 2 times daily for 6 days  Qty: 12 tablet, Refills: 0    Associated Diagnoses: Pneumonia of both lower lobes due to infectious organism      colchicine (COLCRYS) 0.6 MG tablet Take 0.5 tablets (0.3 mg) by mouth 2 times daily  Qty: 30 tablet, Refills: 0    Associated Diagnoses: Pericardial effusion      ibuprofen (ADVIL/MOTRIN) 600 MG tablet Take 1 tablet (600 mg) by mouth 3 times daily  Qty: 42 tablet, Refills: 1    Associated Diagnoses: Pericardial effusion      pantoprazole (PROTONIX) 40 MG EC tablet Take 1 tablet (40 mg) by mouth every morning (before breakfast)  Qty: 30 tablet, Refills: 1    Associated Diagnoses: Prophylactic measure           CONTINUE these medications which have NOT CHANGED    Details   amLODIPine-valsartan (EXFORGE) 5-160 MG tablet TAKE 1 TABLET EVERY DAY  Qty: 90 tablet,  Refills: 0    Associated Diagnoses: Elevated blood pressure reading without diagnosis of hypertension      empagliflozin (JARDIANCE) 25 MG TABS tablet Take 1 tablet (25 mg) by mouth daily  Qty: 90 tablet, Refills: 0    Associated Diagnoses: Type 2 diabetes mellitus with microalbuminuria, without long-term current use of insulin (H)      glipiZIDE (GLUCOTROL XL) 10 MG 24 hr tablet Take 2 tablets (20 mg) by mouth daily  Qty: 180 tablet, Refills: 1    Associated Diagnoses: Type 2 diabetes mellitus with microalbuminuria, without long-term current use of insulin (H)      metFORMIN (GLUCOPHAGE) 1000 MG tablet TAKE 1 TABLET TWICE A DAY WITH MEALS  Qty: 180 tablet, Refills: 1    Associated Diagnoses: Type 2 diabetes mellitus with microalbuminuria, without long-term current use of insulin (H)      rosuvastatin (CRESTOR) 20 MG tablet Take 1 tablet (20 mg) by mouth daily  Qty: 90 tablet, Refills: 3    Associated Diagnoses: Hyperlipidemia LDL goal <100      Semaglutide, 2 MG/DOSE, (OZEMPIC) 8 MG/3ML pen Inject 2 mg Subcutaneous every 7 days  Qty: 9 mL, Refills: 0    Associated Diagnoses: Type 2 diabetes mellitus with microalbuminuria, without long-term current use of insulin (H)      insulin pen needle (31G X 8 MM) 31G X 8 MM miscellaneous Use 1 pen needles once a week or as directed.  Qty: 100 each, Refills: 3    Associated Diagnoses: Type 2 diabetes mellitus with microalbuminuria, without long-term current use of insulin (H)      OneTouch Delica Lancets 33G MISC 1 each 2 times daily  Qty: 100 each, Refills: 3    Associated Diagnoses: Type 2 diabetes mellitus with microalbuminuria, without long-term current use of insulin (H)      ONETOUCH VERIO IQ test strip Use to test blood sugar 1-2 times daily or as directed.  Qty: 50 strip, Refills: 3    Associated Diagnoses: Type 2 diabetes mellitus with microalbuminuria, without long-term current use of insulin (H)           STOP taking these medications       sildenafil (REVATIO) 20  MG tablet Comments:   Reason for Stopping:                   Discharge Instructions and Follow-Up:      Discharge Orders      Follow-Up with Cardiology CHARLINE      Activity    Your activity upon discharge: activity as tolerated     Follow-up and recommended labs and tests     Follow-up with primary care provider, David Deutsch, within 7 days for hospital follow- up.  The following labs/tests are recommended: CBC, BMP, LFT's.     Reason for your hospital stay    1. Acute hypoxic respiratory failure secondary to bilateral CAP, organism unspecified.  2. Large pericardial effusion.  3. Epistaxis.  4. Abnormal LFTs likely due to enlarged fatty liver.  5. Anemia, question chronic or dilutional.  6. Recent viral URI.  7. Prior history of small pericardial in 2018.     Echocardiogram Limited    Administration of IV contrast will be tailored to this examination per the appropriate written protocol listed in the Echocardiography department Protocol Book, or by the supervising Cardiologist. This may result in an order change.    Use of contrast is at the discretion of the supervising Cardiologist.     Diet    Follow this diet upon discharge: Orders Placed This Encounter      Combination Diet Moderate Consistent Carb (60 g CHO per Meal) Diet             Consultations This Hospital Stay:      CARDIOLOGY IP CONSULT  CARDIOVASCULAR SURGERY IP CONSULT        Admission History:      Please see the H&P by Yolanda Sanchez DO on 11/11/2023 for complete details. Briefly, Geoffrey Wilson is a 43 year old male with past medical history of hypertension, hyperlipidemia and type 2 diabetes that is well managed who was admitted from [facility] on 11/11/2023 for management of community-acquired pneumonia and a pericardial effusion without initial evidence of tamponade.    PTA, had 1 week history of viral URI symptoms, negative for COVID on 11/7/2023  Presented to [facility] ED day of admission 11/11/2023 with a 3-day history of  worsening dyspnea on exertion and fatigue. Some shortness of breath at rest but significantly exacerbated with minimal activity.  Occasional cough minimal sputum production at this point.   He did note he coughed up some blood earlier in the day, but this has not persisted.  No chest pain. Appetite had been poor but no associated nausea or vomiting. In ED, was afebrile, tachycardic with heart rates 110, blood pressure stable, O2 sats low 90s.  WBC 12.0, lactate 1.4; lytes renal function and hemoglobin all stable.  LFTs mildly elevated.  CT chest was negative for PE; showed dense left lower lobe and right lower lobe consolidations.; also found to have a moderate pericardial effusion.     Transferred to [facility] 11/11/2023 for management.          Problem Oriented Hospital Course:        Acute hypoxic respiratory failure secondary to bilateral CAP, organism unspecified.  Recent viral URI.  * Initial presentation as above. BC's from 11/11 NGTD. Started on azithromycin and ceftriaxone in the ED.  * Transferred to [facility] 11/11. Procalcitonin 0.61, lactate normal, WBC 11.1, viral panel negative on admit.  * On 11/13, on RA.  * On 11/14, stable on RA, some decreased exertional tolerance.  - Discharge on cefuroxime to stop after 11/20.  - Continue to monitor cultures    Large pericardial effusion.  Prior history of small pericardial in 2018  * Stress echo done on 9/2018 showed small pericardial effusion.  * Last echo done on 10/10/2018 showed small pericardial effusion which was less than 1 cm in circumferential.  * Initial presentation as above.  * On 11/12, echo showed LVEF 60-65%; mild TR; moderate pericardial effusion, no signs of tamponade. Cardiology consulted on admit.   * On 11/13, cMRI showed normal LV and RV systolic functions; large pericardial effusion, no chamber collapse noted. Diagnostic and therapeutic pericardiocentesis ordered; however interventionalist felt benefits were outweighed by risks as it was  felt it would be difficult given the location of the effusion and pt's habitus/obesity.  * On 11/14, seen by CV Surgery. Limited echo showed large pericardial effusion, no significant changes, no signs of tamponade.  - Plan trial of colchicine and ibuprofen with repeat echo in 2-4 weeks; if still has a large effusion, may need consideration for pericardiocentesis or pericardial window.  - Follow-up with Cardiology.    Epistaxis.  * RRT called for epistaxis 11/13. Improved with pressure with tissues and Afrin nasal spray.  * On 11/14, stable, no issues.    Hypertension (benign essential).  Hyperlipidemia.  [PTA: amlodipine-valsartan 5-160 mg daily.]  - Continue PTA regimen at discharge.    DM II, well managed.  [PTA: empagliflozin 25 mg daily; glipizide 20 mg daily; metformin 1000 mg BID; semaglutide 2 mg subcutaneous q7d on Tues.]  * A1c 6.9 in 7/2023.   * PTA meds held on admit.  - Continue PTA regimen at discharge.    Abnormal LFTs likely due to enlarged fatty liver.  * Initial presentation as above. LFT's 11/11 showed , , , tbili normal.  * On admit, noted no prior record of LFTs in [facility] or Care Everywhere ([facility] or [facility]).  Unclear if findings represent acute change versus chronic abnormality. Concern for possible component of fatty liver given BMI vs due to Ozempic use vs due to statin vs due to other etiology?  * US of the abdomen 11/12 showed fatty liver and normal gallbladder.  Recent Labs   Lab 11/14/23  0618 11/13/23  0635 11/12/23  1756 11/12/23  0632 11/11/23  1833   ALBUMIN 3.3* 3.3*  --  3.0* 3.0*   BILITOTAL 0.3 0.4  --  0.4 0.4   * 216*  --  207* 229*   * 113*  --  135* 171*   ALKPHOS 171* 179*  --  172* 184*   CR 0.77 0.69 0.71 0.72 0.75   - Continue to monitor LFT's.  - Patient instructed to lose weight and follow as outpatient with his primary care physician for repeat LFTs.    Chronic anemia.  * Last hemoglobin 1 year back was 12.5.  * Hgb  "12.5 on admit.  * Hgb normalized.  - Continue to monitor CBC outpatient.    Severe obesity.  * Body mass index is 49.9 kg/m  (pended).  - Needs to pursue aggressive dietary and lifestyle modifications.  - Resume semaglutide at discharge.       Clinically Significant Risk Factors              # Hypoalbuminemia: Lowest albumin = 3 g/dL at 11/12/2023  6:32 AM, will monitor as appropriate     # Hypertension: Noted on problem list       # DMII: A1C = N/A within past 6 months, PRESENT ON ADMISSION  # Severe Obesity: Estimated body mass index is 49.28 kg/m  (pended) as calculated from the following:    Height as of this encounter: (P) 1.854 m (6' 1\").    Weight as of this encounter: 169.4 kg (373 lb 8 oz)., PRESENT ON ADMISSION                  Pending Results:        Unresulted Labs Ordered in the Past 30 Days of this Admission       Date and Time Order Name Status Description    11/11/2023  6:08 PM Blood Culture Arm, Left Preliminary     11/11/2023  6:08 PM Blood Culture Arm, Right Preliminary                   Discharge Disposition:      Discharged to home.        Discharge Time:      Less than 30 minutes.          Condition and Physical on Discharge:    See progress note on the same date as this discharge summary.          Key Imaging Studies, Lab Findings and Procedures/Surgeries:        Results for orders placed or performed during the hospital encounter of 11/11/23   US Abdomen Limited    Narrative    EXAM: US ABDOMEN LIMITED  LOCATION: [facility]  DATE: 11/12/2023    INDICATION: assess for acute abnormality, evidence of fatty liver  COMPARISON: None.  TECHNIQUE: Limited abdominal ultrasound.    FINDINGS:    GALLBLADDER: No gallstones. No gallbladder wall thickening or pericholecystic fluid. Negative sonographic Gil sign.    BILE DUCTS: No biliary dilatation.    LIVER: Enlarged echogenic liver. No focal mass.    RIGHT KIDNEY: No hydronephrosis.    PANCREAS: The visualized portions are " normal.    No ascites.      Impression    IMPRESSION:  1.  Enlarged fatty liver.  2.  Normal gallbladder.       MR Cardiac w/o Contrast    Narrative                                                     [facility]                                                     CMR Report      MRN:              [id number]                                  Name:        TATY OLGUIN                                  :             1980-Jul-15                                  Scan Date:                                         Electronically signed by Nasim Mcqueen  14:24:55    SUMMARY   ==========================================================================================================    Clinical history: Pericardial effusion  Comparison CMR: none    Limited study- patient could not tolerate due to shortness of breath    1. The LV is normal in cavity size and wall thickness. The global systolic function is normal. The LVEF  visually is 60-65 %. There are no regional wall motion abnormalities.    2. The RV is normal in cavity size. The global systolic function is normal.     3. There is large pericardial effusion. It measures 2.4 cm over the RV free wall. No chamber collapse  noted.      CONCLUSIONS:   Limited study    1. Normal LV and RV systolic functions.  2. Large pericardial effusion. No chamber collapse noted.    Discussed with inpatient cardiology team.    CORE EXAM   ==========================================================================================================    ANATOMY   ----------------------------------------------------------------------------------------      PERICARDIUM       ----------------------------------------------          EFFUSION:  LARGE        PLEURAL EFFUSION       ----------------------------------------------   SMALL RIGHT, SMALL LEFT         SCAN INFO    ==========================================================================================================    GENERAL   ----------------------------------------------------------------------------------------      CONTRAST AGENT       ----------------------------------------------          GD CONCENTRATION:  0.5 M        VITALS       ----------------------------------------------          HEIGHT:  73.0 in          HEIGHT:  185.4 cm          WEIGHT:  378.0 lbs          WEIGHT:  171.5 kgs          BSA:  2.82 m^2        SETUP       ----------------------------------------------          REFERRING PHYSICIAN:  PROVIDER SYSTEM          ATTENDING PHYSICIAN:  BARB GUERRA   ----------------------------------------------------------------------------------------      CPT Codes      ICD10 Codes      Report generated by [employer], a product of [employer]   Echocardiogram Complete     Value    LVEF  60-65%    Narrative    [id number]  [id number]  [id number]  [id number]^BEATRIS^BARB^WALLY     [facility]  Echocardiography Laboratory  [address] [place] [address]     Name: TATY OLGUIN  MRN: [id number]  : 1980  Study Date: 2023 11:13 AM  Age: 43 yrs  Gender: Male  Patient Location: [facility]  Reason For Study: Pericardial Effusion  Ordering Physician: BARB SPEAR  Referring Physician: SYSTEM, PROVIDER NOT IN  Performed By: Tess Adkins     BSA: 2.8 m2  Height: 73 in  Weight: 385 lb  HR: 86  ______________________________________________________________________________  Procedure  Complete Echo Adult. Optison (NDC #[id number]) given intravenously.  ______________________________________________________________________________  Interpretation Summary     Left ventricular systolic function is normal.The visual ejection fraction is  60-65%.  There is mild (1+) tricuspid regurgitation.  Moderate pericardial effusion  There are no echocardiographic  indications of cardiac tamponade.  ______________________________________________________________________________  Left Ventricle  The left ventricle is normal in size. There is normal left ventricular wall  thickness. Left ventricular systolic function is normal. The visual ejection  fraction is 60-65%. No regional wall motion abnormalities noted.     Right Ventricle  The right ventricle is normal size. The right ventricular systolic function is  normal.     Atria  The left atrium is mildly dilated. Right atrial size is normal.     Mitral Valve  There is trace mitral regurgitation.     Tricuspid Valve  There is mild (1+) tricuspid regurgitation. Right ventricular systolic  pressure could not be approximated due to inadequate tricuspid regurgitation.     Aortic Valve  The aortic valve is not well visualized. No aortic regurgitation is present.  No aortic stenosis is present.     Pulmonic Valve  The pulmonic valve is not well visualized. There is no pulmonic valvular  stenosis.     Vessels  The aortic root is normal size. Normal size ascending aorta. Dilation of the  inferior vena cava is present with normal respiratory variation in diameter.  Normal pulmonary vein velocity.     Pericardium  Moderate pericardial effusion. There are no echocardiographic indications of  cardiac tamponade.     Rhythm  Sinus rhythm was noted.  ______________________________________________________________________________  MMode/2D Measurements & Calculations     IVSd: 1.1 cm  LVIDd: 5.5 cm  LVIDs: 3.8 cm  LVPWd: 1.0 cm  FS: 29.7 %  LV mass(C)d: 225.2 grams  LV mass(C)dI: 79.2 grams/m2  Ao root diam: 3.1 cm  asc Aorta Diam: 3.5 cm  LVOT diam: 2.4 cm  LVOT area: 4.4 cm2  Ao root diam index Ht(cm/m): 1.7  Ao root diam index BSA (cm/m2): 1.1  Asc Ao diam index BSA (cm/m2): 1.2  Asc Ao diam index Ht(cm/m): 1.9  LA Volume (BP): 102.0 ml     LA Volume Index (BP): 35.9 ml/m2  RWT: 0.37  TAPSE: 2.6 cm     Doppler Measurements & Calculations  MV E  max scottie: 118.0 cm/sec  MV A max scottie: 108.0 cm/sec  MV E/A: 1.1  Ao V2 max: 181.3 cm/sec  Ao max P.0 mmHg  OJRGE(V,D): 2.7 cm2  LV V1 max P.0 mmHg  LV V1 max: 112.1 cm/sec  LV V1 VTI: 21.9 cm  SV(LVOT): 95.9 ml  SI(LVOT): 33.8 ml/m2  PA V2 max: 101.1 cm/sec  PA max P.1 mmHg  PA acc time: 0.11 sec  AV Scottie Ratio (DI): 0.62  E/E' av.4  Lateral E/e': 12.1  Medial E/e': 16.7     ______________________________________________________________________________  Report approved by: Nadia Abdalla 2023 02:23 PM         Echocardiogram Limited    Narrative    [id number]  [id number]  [id number]  [id number]^CLAUDINE^BERNA     [facility]  Echocardiography Laboratory  [address] [place] [address]     Name: TATY OLGUIN  MRN: [id number]  : 1980  Study Date: 2023 03:22 PM  Age: 43 yrs  Gender: Male  Patient Location: [facility]  Reason For Study: Perciardium Disease, unspecified  Ordering Physician: BERNA CHOW  Referring Physician: SYSTEM, PROVIDER NOT IN  Performed By: Ector Pratt     BSA: 2.8 m2  Height: 74 in  Weight: 378 lb  BP: 147/82 mmHg  ______________________________________________________________________________  Procedure  Limited Portable Echo Adult.  ______________________________________________________________________________  Interpretation Summary     MRI from today showed large pericardial effusion.     Limited study in the cath lab to assess for tap. Apical effusion measures 1  cms, posteriorly and around the RV free wall it is 2.3-2.5 cms.     No evidence for tamponade.     Normal biventricular size and function.  ______________________________________________________________________________  ______________________________________________________________________________  Report approved by: Nadia Hart 2023 04:26 PM     ______________________________________________________________________________      Echocardiogram Limited    Narrative     [id number]  [id number]  [id number]  [id number]^ANNA MARIE^BISMARK^RADHA     [facility]  Echocardiography Laboratory  [address] [place] [address]     Name: TATY OLGUIN  MRN: [id number]  : 1980  Study Date: 2023 08:10 AM  Age: 43 yrs  Gender: Male  Patient Location: [facility]  Reason For Study: Pericardial Effusion  Ordering Physician: BISMARK THRASHER  Referring Physician: SYSTEM, PROVIDER NOT IN  Performed By: Garett Maldonado     BSA: 2.8 m2  Height: 73 in  Weight: 378 lb  HR: 82  BP: 141/78 mmHg  ______________________________________________________________________________  Procedure  Limited Portable Echo Adult. Optison (NDC #[id number]) given intravenously.  ______________________________________________________________________________  Interpretation Summary     Limited echocardiogram.     Normal LV size and systolic function. Normal RV size and systolic function.  There is a large circumferential pericardial effusion noted as reported  yesterday. However apical windows report small effusion at the apex. Majority  of the effusion is located anteriorly and posteriorly more towards the mid and  the basal portions of the heart.  No tamponade features.     No significant changes from yesterday.  ______________________________________________________________________________  Left Ventricle  The left ventricle is normal in size. Left ventricular systolic function is  normal.     Right Ventricle  The right ventricle is normal in size and function.     Tricuspid Valve  The tricuspid valve is normal in structure and function.     Aortic Valve  The aortic valve is trileaflet with aortic valve sclerosis. No hemodynamically  significant valvular aortic stenosis.     Pulmonic Valve  The pulmonic valve is not well visualized.     Vessels  The aortic root is normal size. The inferior vena cava was normal in size with  preserved respiratory variability.     Pericardium  Large  pericardial effusion.  ______________________________________________________________________________  MMode/2D Measurements & Calculations  IVSd: 1.0 cm  LVIDd: 5.2 cm  LVIDs: 3.5 cm  LVPWd: 0.90 cm  FS: 33.6 %  LV mass(C)d: 181.4 grams  LV mass(C)dI: 64.3 grams/m2  Ao root diam: 3.4 cm  LA dimension: 3.8 cm  asc Aorta Diam: 3.4 cm  LA/Ao: 1.1  LVOT diam: 2.3 cm  LVOT area: 4.3 cm2  Ao root diam index Ht(cm/m): 1.8  Ao root diam index BSA (cm/m2): 1.2  Asc Ao diam index BSA (cm/m2): 1.2     Asc Ao diam index Ht(cm/m): 1.8  RWT: 0.35  TAPSE: 1.6 cm     Doppler Measurements & Calculations  RV S Scottie: 18.0 cm/sec     ______________________________________________________________________________  Report approved by: Nadia Hart 11/14/2023 09:56 AM         Cardiac Catheterization    Narrative    Prior to attempt at pericardiocentesis a bedside limited echocardiogram   was performed with the patient sitting upright on a wedge.  Compared to   11/11 (TTE had not been repeated since that time) interval improvement in   effusion size was observed in multiple views.  Although the posterior and   lateral wall did show areas of effusion depth of approximately 2cm around   the posterior and lateral walls, it measured no more than 1cm around   potential areas of entry from the apical and subxyphoid approach, which   would pose a reasonably high risk from a percutaneous approach.  Given the   overall clinical picture (the patient is hemodynamically stable,   hypertensive, improving clinically and with regards to the size of his   effusion with antibiotic therapy, currently lacks any clinical or   echocardiographic features of tamponade, and weighs nearly 400 pounds with   a large chest/abdominal circumference) the procedure was aborted.

## 2023-11-14 NOTE — PROGRESS NOTES
Date:  11/14/2023    To whom it may concern:    Mr. Geoffrey Wilson was hospitalized at [facility] from 11/11/2023 to 11/14/2023.  He should remain off of work until 11/20/2023. He should avoid moderate to strenuous activity until cleared by Cardiology.    Thank you for your understanding. Please do not hesitate to contact me if you have any questions or concerns.    Sincerely,      Dilip Wright Jr., MD  [facility]  [phone]  r

## 2023-11-14 NOTE — CONSULTS
Cardiothoracic Surgery Consult Note    Consult Reason: Large pericardial effusion    HPI: Geoffrey Wilson is a 43 year old gentleman with a PMH significant for HTN, HLD, DM2, obesity. Recently had viral URI and progressive worsening CABALLERO/fatigue. Found negative for COVID, PE but was discovered he had LLL and RLL CAP. Additionally, workup revealed moderate to large pericardial effusion, no e/o tamponade. Cardiology attempted pericardiocentesis but no safe windows to drain. Ultimately, the cardiology team decided to treat with colchicine and re-image at a later time. CVTS was consulted in the event he failed medical management and the need for pericardial window became necessary. No previous chest surgeries. Currently hemodynamically stable on room air with no SOB at rest.      PMH:  Past Medical History:   Diagnosis Date    Cellulitis-scrotum 08/15/2016    Diabetes mellitus, type 2 (H)     HTN (hypertension)     Hyperlipidemia LDL goal <100     SIRS (systemic inflammatory response syndrome) (H) 08/16/2016         PSH:  Past Surgical History:   Procedure Laterality Date    HC REPAIR ING HERNIA,5+Y/O,REDUCIBL  Age 6       Past Surgical History:   Procedure Laterality Date    HC REPAIR ING HERNIA,5+Y/O,REDUCIBL  Age 6         FH:  Family History   Problem Relation Age of Onset    Hypertension Mother     Diabetes Father     Lipids Father     Heart Disease Brother     Leukemia Brother        SH:  Social History     Socioeconomic History    Marital status:    Occupational History    Occupation: Optometric Assistant   Tobacco Use    Smoking status: Never    Smokeless tobacco: Never   Vaping Use    Vaping Use: Never used   Substance and Sexual Activity    Alcohol use: Yes     Alcohol/week: 0.0 standard drinks of alcohol     Comment: 2 times/ month    Drug use: No    Sexual activity: Never       Home Meds:  Medications Prior to Admission   Medication Sig Dispense Refill Last Dose    amLODIPine-valsartan (EXFORGE) 5-160 MG  tablet TAKE 1 TABLET EVERY DAY 90 tablet 0 11/10/2023 at pm    empagliflozin (JARDIANCE) 25 MG TABS tablet Take 1 tablet (25 mg) by mouth daily 90 tablet 0 11/10/2023    glipiZIDE (GLUCOTROL XL) 10 MG 24 hr tablet Take 2 tablets (20 mg) by mouth daily 180 tablet 1 11/11/2023    metFORMIN (GLUCOPHAGE) 1000 MG tablet TAKE 1 TABLET TWICE A DAY WITH MEALS 180 tablet 1 11/11/2023 at am    rosuvastatin (CRESTOR) 20 MG tablet Take 1 tablet (20 mg) by mouth daily 90 tablet 3 11/10/2023    Semaglutide, 2 MG/DOSE, (OZEMPIC) 8 MG/3ML pen Inject 2 mg Subcutaneous every 7 days 9 mL 0 11/7/2023 at Tue    insulin pen needle (31G X 8 MM) 31G X 8 MM miscellaneous Use 1 pen needles once a week or as directed. 100 each 3     OneTouch Delica Lancets 33G MISC 1 each 2 times daily 100 each 3     ONETOUCH VERIO IQ test strip Use to test blood sugar 1-2 times daily or as directed. 50 strip 3     sildenafil (REVATIO) 20 MG tablet 2-5 tabs daily prn (Patient not taking: Reported on 11/11/2023) 30 tablet 11 Not Taking       Allergies:  Allergies   Allergen Reactions    Lisinopril Cough    Penicillins Rash       ROS:  ROS: A complete 10 point ROS neg other than the symptoms noted above in the HPI.     Physical Exam:  Temp:  [98.4  F (36.9  C)-99.1  F (37.3  C)] 99.1  F (37.3  C)  Pulse:  [75-90] 82  Resp:  [18-20] 18  BP: (141-155)/(78-98) 148/85  SpO2:  [88 %-97 %] 94 %  Gen: NAD, resting comfortably in bed, conversational  HEENT: normocephalic, atraumatic cranium, EOMI, sclerae anicteric. Oral mucosa pink and moist, midline trachea  Lungs: CTA in all fields, no wheezing or rhonchi on room air  CV: RRR, S1S2 normal, no murmur. Radial pulses and DP pulses symmetric. No dependent edema.   Abd: no scars, positive normal pitched bowel sounds, overall soft and non distended, nontender, no hepatosplenomegaly, no masses/guarding/rebound tenderness.   Musculoskeletal: grossly intact, strength 5/5 upper and lower extremities  Neuro: AOx3, CN II-VII  grossly intact, sensation/motor intact in upper and lower extremities  Mental: normal mood and affect, regular rate of speech    Labs:  ABG No lab results found in last 7 days.  CBC  Recent Labs   Lab 23  0618 23  0632 23  1833   WBC 9.8 11.0 11.1*   HGB 13.7 12.0* 12.5*    298 333     BMP  Recent Labs   Lab 23  0850 23  0618 23  0221 23  2123 23  0807 23  0635 23  2108 23  1756 23  1249 23  0632 23  2200 23  1833   NA  --  139  --   --   --  137  --   --   --  138  --  136   POTASSIUM  --  4.5  --   --   --  4.1  --   --   --  3.8  --  3.7   CHLORIDE  --  105  --   --   --  104  --   --   --  103  --  101   CO2  --  25  --   --   --  24  --   --   --  23  --  21*   BUN  --  12.7  --   --   --  9.5  --   --   --  11.0  --  12.8   CR  --  0.77  --   --   --  0.69  --  0.71  --  0.72  --  0.75   * 143* 139* 190*   < > 142*   < >  --    < > 105*   < > 95    < > = values in this interval not displayed.     LFT  Recent Labs   Lab 23  0618 23  0635 23  0632 23  1833   * 113* 135* 171*   * 216* 207* 229*   ALKPHOS 171* 179* 172* 184*   BILITOTAL 0.3 0.4 0.4 0.4   ALBUMIN 3.3* 3.3* 3.0* 3.0*     PancreasNo lab results found in last 7 days.    Imagin23 Echocardiogram  Interpretation Summary     Limited echocardiogram.     Normal LV size and systolic function. Normal RV size and systolic function.  There is a large circumferential pericardial effusion noted as reported  yesterday. However apical windows report small effusion at the apex. Majority  of the effusion is located anteriorly and posteriorly more towards the mid and  the basal portions of the heart.  No tamponade features.     No significant changes from yesterday.    A/P: Patient is a 43 year old male with large pericardial effusion, stable. CVTS was consulted in the event pericardial window became necessary.  -  Treatment per primary team  - Agree with cardiology re: colchicine and re-imaging at a later date for comparison  - Please re-consult CVTS if our services become necessary  - Thank you for the opportunity to participate in the care of this patient    Patient and plan discussed with attending, Dr. Mulvihill Leah Jensen, APRN, [facility]-, CCRN  Nurse Practitioner  Cardiothoracic Surgery  Pager: [phone]    10:40 AM  November 14, 2023

## 2023-11-14 NOTE — PLAN OF CARE
Goal Outcome Evaluation:      Plan of Care Reviewed With: patient, spouse     Discharge home with wife, up in the room independent tolerated food, denies pain. VSS. RA.

## 2023-11-14 NOTE — PROGRESS NOTES
Assessment and Plan:   Geoffrey Wilson is a 43 year old male who was admitted on 11/11/2023.    Large pericardial effusion without tamponade features  Pneumonia  Obesity hypertension diabetes and dyslipidemia    Plan and recommendations  I have reviewed imaging with the MRI attending from yesterday.  I discussed his case with 2 interventional attending from yesterday.  I reviewed echocardiography from yesterday and this morning all personally.  There is a large pericardial effusion however there is minimal access site at the apex at the entry point for a percutaneous pericardiocentesis.  Given his morbid obesity and weight, he would be at high risk for percutaneous intervention for his pericardial effusion.    Given large effusion noted on MRI that is significantly worse from a few years ago, I recommend diagnostic and therapeutic pericardiocentesis.  Unfortunately due to reasons mentioned above, we will need to get surgical opinion for pericardial window.    However as a first-line approach, I recommend short course treatment with colchicine and ibuprofen and repeating imaging at 2 to 4 weeks.  If effusion is worsening, we absolutely need to proceed with a window.  If effusion is not improving, we will proceed with a window.    The patient lives up in [facility].  We will have him follow-up with our cardiology team there.    Further noncardiac treatment per medicine team.  We will sign off please call us with questions.  I have advised the patient that he has any worsening shortness of breath, he needs to immediately seek medical attention.    It is okay to give the patient a letter to avoid physical activity of moderate or higher intensity until his pericardial effusion has been addressed.        Interval History:     No significant overnight issues.  No cardiac symptoms this morning.          Medications:      amLODIPine  5 mg Oral Daily    azithromycin  250 mg Oral Daily    cefTRIAXone  2 g Intravenous  Q24H    colchicine  0.3 mg Oral BID    ibuprofen  600 mg Oral TID    insulin aspart  1-7 Units Subcutaneous TID AC    insulin aspart  1-5 Units Subcutaneous At Bedtime    pantoprazole  40 mg Oral QAM AC    saline nasal   Each Nare 4x Daily    sodium chloride (PF)  3 mL Intracatheter Q8H    valsartan  160 mg Oral Daily            Physical Exam:   Patient Vitals for the past 24 hrs:   BP Temp Temp src Pulse Resp SpO2 Weight   11/14/23 0747 (!) 148/85 99.1  F (37.3  C) Oral 82 18 94 % --   11/14/23 0741 -- -- -- -- -- 96 % --   11/14/23 0740 -- -- -- -- -- 93 % --   11/14/23 0739 -- -- -- -- -- 94 % --   11/14/23 0738 -- -- -- -- -- 97 % --   11/14/23 0737 -- -- -- -- -- 92 % --   11/14/23 0736 -- -- -- -- -- 93 % --   11/14/23 0735 -- -- -- -- -- 93 % --   11/14/23 0734 -- -- -- -- -- 91 % --   11/14/23 0733 -- -- -- -- -- 90 % --   11/14/23 0732 -- -- -- -- -- 91 % --   11/14/23 0731 -- -- -- -- -- 94 % --   11/14/23 0730 -- -- -- -- -- 92 % --   11/14/23 0729 -- -- -- -- -- 92 % --   11/14/23 0727 -- -- -- -- -- 91 % --   11/14/23 0726 -- -- -- -- -- 91 % --   11/14/23 0725 -- -- -- -- -- 90 % --   11/14/23 0724 -- -- -- -- -- 92 % --   11/14/23 0723 -- -- -- -- -- 91 % --   11/14/23 0722 -- -- -- -- -- 92 % --   11/14/23 0721 -- -- -- -- -- 93 % --   11/14/23 0720 -- -- -- -- -- 90 % --   11/14/23 0719 -- -- -- -- -- 92 % --   11/14/23 0718 -- -- -- -- -- 92 % --   11/14/23 0717 -- -- -- -- -- 91 % --   11/14/23 0716 -- -- -- -- -- 91 % --   11/14/23 0715 -- -- -- -- -- 91 % --   11/14/23 0714 -- -- -- -- -- (!) 89 % --   11/14/23 0713 -- -- -- -- -- 92 % --   11/14/23 0712 -- -- -- -- -- 91 % --   11/14/23 0711 -- -- -- -- -- 91 % --   11/14/23 0710 -- -- -- -- -- 92 % --   11/14/23 0709 -- -- -- -- -- 90 % --   11/14/23 0708 -- -- -- -- -- 92 % --   11/14/23 0707 -- -- -- -- -- (!) 89 % --   11/14/23 0706 -- -- -- -- -- 91 % --   11/14/23 0705 -- -- -- -- -- (!) 89 % --   11/14/23 0704 -- -- -- -- -- 92 % --    11/14/23 0703 -- -- -- -- -- 92 % --   11/14/23 0702 -- -- -- -- -- 91 % --   11/14/23 0701 -- -- -- -- -- (!) 89 % --   11/14/23 0654 -- -- -- -- -- 96 % --   11/14/23 0653 -- -- -- -- -- 94 % --   11/14/23 0652 -- -- -- -- -- 94 % --   11/14/23 0651 -- -- -- -- -- 94 % --   11/14/23 0650 -- -- -- -- -- 95 % --   11/14/23 0649 -- -- -- -- -- 92 % --   11/14/23 0648 -- -- -- -- -- 95 % --   11/14/23 0647 -- -- -- -- -- 94 % --   11/14/23 0646 -- -- -- -- -- 95 % --   11/14/23 0645 -- -- -- -- -- 96 % --   11/14/23 0644 -- -- -- -- -- 95 % --   11/14/23 0643 -- -- -- -- -- 94 % --   11/14/23 0642 -- -- -- -- -- 95 % --   11/14/23 0641 -- -- -- -- -- 95 % --   11/14/23 0640 -- -- -- -- -- 94 % --   11/14/23 0639 -- -- -- -- -- 96 % --   11/14/23 0638 -- -- -- -- -- 93 % --   11/14/23 0637 -- -- -- -- -- 94 % --   11/14/23 0636 -- -- -- -- -- 94 % --   11/14/23 0635 -- -- -- -- -- 93 % --   11/14/23 0634 -- -- -- -- -- 94 % --   11/14/23 0633 -- -- -- -- -- 94 % --   11/14/23 0631 -- -- -- -- -- 92 % --   11/14/23 0630 -- -- -- -- -- 96 % (!) 169.4 kg (373 lb 8 oz)   11/14/23 0629 -- -- -- -- -- 95 % --   11/14/23 0628 -- -- -- -- -- 92 % --   11/14/23 0627 -- -- -- -- -- 92 % --   11/14/23 0626 -- -- -- -- -- 91 % --   11/14/23 0625 -- -- -- -- -- 92 % --   11/14/23 0624 -- -- -- -- -- 92 % --   11/14/23 0623 -- -- -- -- -- 92 % --   11/14/23 0622 -- -- -- -- -- 91 % --   11/14/23 0621 -- -- -- -- -- 92 % --   11/14/23 0620 -- -- -- -- -- 93 % --   11/14/23 0619 -- -- -- -- -- 93 % --   11/14/23 0618 -- -- -- -- -- 92 % --   11/14/23 0617 -- -- -- -- -- 93 % --   11/14/23 0616 -- -- -- -- -- 97 % --   11/14/23 0615 -- -- -- -- -- 96 % --   11/14/23 0614 -- -- -- -- -- 94 % --   11/14/23 0613 -- -- -- -- -- 93 % --   11/14/23 0612 -- -- -- -- -- 93 % --   11/14/23 0610 -- -- -- -- -- 94 % --   11/14/23 0609 -- -- -- -- -- 93 % --   11/14/23 0608 -- -- -- -- -- 94 % --   11/14/23 0607 -- -- -- -- -- 93 % --   11/14/23  0606 -- -- -- -- -- 93 % --   11/14/23 0604 -- -- -- -- -- 94 % --   11/14/23 0603 -- -- -- -- -- 93 % --   11/14/23 0602 -- -- -- -- -- 93 % --   11/14/23 0601 -- -- -- -- -- 93 % --   11/14/23 0600 -- -- -- -- -- 93 % --   11/14/23 0400 (!) 147/92 -- -- 79 18 -- --   11/14/23 0100 (!) 150/90 98.5  F (36.9  C) Oral 80 18 95 % --   11/14/23 0000 -- -- -- -- -- 93 % --   11/13/23 2200 -- -- -- -- -- 93 % --   11/13/23 2018 (!) 141/78 98.4  F (36.9  C) Oral 90 18 91 % --   11/13/23 1445 -- -- -- -- -- 92 % --   11/13/23 1230 (!) 147/82 -- -- 75 20 90 % --   11/13/23 1210 -- -- -- -- -- 91 % --   11/13/23 1200 (!) 146/93 -- -- 86 -- (!) 88 % --   11/13/23 1143 (!) 155/95 -- -- 86 -- 90 % --   11/13/23 1130 (!) 151/98 -- -- 85 -- (!) 89 % --     Vitals:    11/11/23 1727 11/12/23 0706 11/13/23 0605 11/14/23 0630   Weight: (!) (P) 176 kg (388 lb) (!) 174.7 kg (385 lb 3.2 oz) (!) 171.6 kg (378 lb 3.2 oz) (!) 169.4 kg (373 lb 8 oz)         Intake/Output Summary (Last 24 hours) at 11/14/2023 1128  Last data filed at 11/14/2023 1100  Gross per 24 hour   Intake 16 ml   Output --   Net 16 ml       11/09 0700 - 11/14 0659  In: 1923 [P.O.:1000; I.V.:923]  Out: -   Net: 1923    Exam:  General alert oriented x3 no acute distress  Respirations clear to auscultation  Cardiovascular regular heart sounds no edema  Abdomen nondistended   Psych appropriate affect         Data:   LABS (Last four results)  CMP  Recent Labs   Lab 11/14/23  0850 11/14/23  0618 11/14/23  0221 11/13/23  2123 11/13/23  0807 11/13/23  0635 11/12/23  2108 11/12/23  1756 11/12/23  1249 11/12/23  0632 11/11/23  2200 11/11/23  1833   NA  --  139  --   --   --  137  --   --   --  138  --  136   POTASSIUM  --  4.5  --   --   --  4.1  --   --   --  3.8  --  3.7   CHLORIDE  --  105  --   --   --  104  --   --   --  103  --  101   CO2  --  25  --   --   --  24  --   --   --  23  --  21*   ANIONGAP  --  9  --   --   --  9  --   --   --  12  --  14   * 143* 139*  "190*   < > 142*   < >  --    < > 105*   < > 95   BUN  --  12.7  --   --   --  9.5  --   --   --  11.0  --  12.8   CR  --  0.77  --   --   --  0.69  --  0.71  --  0.72  --  0.75   GFRESTIMATED  --  >90  --   --   --  >90  --  >90  --  >90  --  >90   COURTNEY  --  9.2  --   --   --  8.8  --   --   --  8.6  --  8.7   PROTTOTAL  --  6.7  --   --   --  6.6  --   --   --  6.3*  --  6.6   ALBUMIN  --  3.3*  --   --   --  3.3*  --   --   --  3.0*  --  3.0*   BILITOTAL  --  0.3  --   --   --  0.4  --   --   --  0.4  --  0.4   ALKPHOS  --  171*  --   --   --  179*  --   --   --  172*  --  184*   AST  --  101*  --   --   --  113*  --   --   --  135*  --  171*   ALT  --  202*  --   --   --  216*  --   --   --  207*  --  229*    < > = values in this interval not displayed.     CBC  Recent Labs   Lab 11/14/23  0618 11/12/23  0632 11/11/23  1833   WBC 9.8 11.0 11.1*   RBC 4.77 4.19* 4.32*   HGB 13.7 12.0* 12.5*   HCT 42.1 36.5* 37.3*   MCV 88 87 86   MCH 28.7 28.6 28.9   MCHC 32.5 32.9 33.5   RDW 13.7 14.0 13.9    298 333     INRNo lab results found in last 7 days.  TROPONINS No results found for: \"TROPI\", \"TROPONIN\", \"TROPR\", \"TROPN\"                                                                                                         BLANCA Hart, [facility]  Cardiology, [facility]    This note was completed in part using dictation via the Dragon voice recognition software. Although reviewed after completion, some word and grammatical errors may occur and must be interpreted in the appropriate clinical context.  If there are any questions pertaining to this issue, please contact me for further clarification or information.    "

## 2023-11-14 NOTE — PLAN OF CARE
Pt stable from 7-11 PM with no change in his current condition.  No c/o pain or SOB, Tele:SR.  Pt does require O2 per NC at NOC to maintain O2 sat's WNL's.  Plan of care is on going.

## 2023-11-14 NOTE — PROGRESS NOTES
[facility]    Internal Medicine Hospitalist Progress Note  11/14/2023  I evaluated patient on the above date.    Dilip Wright Jr., MD  [phone] (p)  Text Page  Vocera        Assessment & Plan New actions/orders today (11/14/2023) are underlined. All lab results in the assessment and plan were reviewed.    Geoffrey Wilson is a 43 year old male with past medical history of hypertension, hyperlipidemia and type 2 diabetes that is well managed who was admitted from [place] ER on 11/11/2023 for management of community-acquired pneumonia and a pericardial effusion without initial evidence of tamponade.    PTA, had 1 week history of viral URI symptoms, negative for COVID on 11/7/2023  Presented to [place] ED day of admission 11/11/2023 with a 3-day history of worsening dyspnea on exertion and fatigue. Some shortness of breath at rest but significantly exacerbated with minimal activity.  Occasional cough minimal sputum production at this point.   He did note he coughed up some blood earlier in the day, but this has not persisted.  No chest pain. Appetite had been poor but no associated nausea or vomiting. In ED, was afebrile, tachycardic with heart rates 110, blood pressure stable, O2 sats low 90s.  WBC 12.0, lactate 1.4; lytes renal function and hemoglobin all stable.  LFTs mildly elevated.  CT chest was negative for PE; showed dense left lower lobe and right lower lobe consolidations.; also found to have a moderate pericardial effusion.     Transferred to [facility] 11/11/2023 for management.        Acute hypoxic respiratory failure secondary to bilateral CAP, organism unspecified.  Recent viral URI.  * Initial presentation as above. BC's from 11/11 NGTD. Started on azithromycin and ceftriaxone in the ED.  * Transferred to [facility] 11/11. Procalcitonin 0.61, lactate normal, WBC 11.1, viral panel negative on admit.  * On 11/13, on RA.  * On 11/14, stable on RA, some decreased exertional  tolerance.  - Continue azithromycin (started 11/11) and ceftriaxone (stared 11/11).  - Continue to monitor cultures    Large pericardial effusion.  Prior history of small pericardial in 2018  * Stress echo done on 9/2018 showed small pericardial effusion.  * Last echo done on 10/10/2018 showed small pericardial effusion which was less than 1 cm in circumferential.  * Initial presentation as above.  * On 11/12, echo showed LVEF 60-65%; mild TR; moderate pericardial effusion, no signs of tamponade. Cardiology consulted on admit.   * On 11/13, cMRI showed normal LV and RV systolic functions; large pericardial effusion, no chamber collapse noted. Underwent diagnostic and therapeutic pericardiocentesis; however interventionalist felt benefits were outweighed by risks as it was felt it would be difficult given pt's obesity.  - Repeat limited echo today 11/14.  - Continue to monitor clinically.  - Appreciate Cardiology help.    Epistaxis.  * RRT called for epistaxis 11/13. Improved with pressure with tissues and Afrin nasal spray.  * On 11/14, stable, no issues.  - Continue to monitor.  - Continue PRN Afrin.    Hypertension (benign essential).  Hyperlipidemia.  [PTA: amlodipine-valsartan 5-160 mg daily.]  - Continue amlodipine 5 mg daily; valsartan 160 mg daily.    DM II, well managed.  [PTA: empagliflozin 25 mg daily; glipizide 20 mg daily; metformin 1000 mg BID; semaglutide 2 mg subcutaneous q7d on Tues.]  * A1c 6.9 in 7/2023.   * PTA meds held on admit.  - Continue to hold PTA meds for now.  - Continue aspart ISS (medium).    Abnormal LFTs likely due to enlarged fatty liver.  * Initial presentation as above. LFT's 11/11 showed , , , tbili normal.  * On admit, noted no prior record of LFTs in [facility] or Care Everywhere ([facility] or [facility]).  Unclear if findings represent acute change versus chronic abnormality. Concern for possible component of fatty liver given BMI vs due to Ozempic  "use vs due to statin vs due to other etiology?  * US of the abdomen 11/12 showed fatty liver and normal gallbladder.  Recent Labs   Lab 11/14/23  0618 11/13/23  0635 11/12/23  1756 11/12/23  0632 11/11/23  1833   ALBUMIN 3.3* 3.3*  --  3.0* 3.0*   BILITOTAL 0.3 0.4  --  0.4 0.4   * 216*  --  207* 229*   * 113*  --  135* 171*   ALKPHOS 171* 179*  --  172* 184*   CR 0.77 0.69 0.71 0.72 0.75   - Continue to monitor LFT's.  - Patient instructed to lose weight and follow as outpatient with his primary care physician for repeat LFTs.    Chronic anemia.  * Last hemoglobin 1 year back was 12.5.  * Hgb 12.5 on admit.  * Hgb normalized.  Recent Labs   Lab 11/14/23  0618 11/12/23  0632 11/11/23  1833   HGB 13.7 12.0* 12.5*   - Continue to monitor CBC - repeat in am.    Severe obesity.  * Body mass index is 49.9 kg/m  (pended).  - Needs to pursue aggressive dietary and lifestyle modifications.  - Resume semaglutide at discharge.         Clinically Significant Risk Factors              # Hypoalbuminemia: Lowest albumin = 3 g/dL at 11/12/2023  6:32 AM, will monitor as appropriate     # Hypertension: Noted on problem list       # DMII: A1C = N/A within past 6 months, PRESENT ON ADMISSION  # Severe Obesity: Estimated body mass index is 49.28 kg/m  (pended) as calculated from the following:    Height as of this encounter: (P) 1.854 m (6' 1\").    Weight as of this encounter: 169.4 kg (373 lb 8 oz)., PRESENT ON ADMISSION              COVID-19 testing.  COVID-19 PCR Results           No data to display              COVID-19 Antibody Results, Testing for Immunity           No data to display                Diet: Combination Diet Moderate Consistent Carb (60 g CHO per Meal) Diet    Prophylaxis: PCD's, ambulation.   English Catheter: Not present  Lines: None     Code Status: Full Code    Disposition Plan   Expected discharge: 1-2d recommended to prior living arrangement pending  above .  Entered: Dilip Wright MD " "11/14/2023, 9:11 AM         Interval History   Doing OK.  Some slight decreased exertional tolerance when up ambulating.  Some cough.    -Data reviewed today: I reviewed all new labs and imaging over the last 24 hours. I personally reviewed no images or EKG's today.    Physical Exam    , Blood pressure (!) 148/85, pulse 82, temperature 99.1  F (37.3  C), temperature source Oral, resp. rate 18, height (P) 1.854 m (6' 1\"), weight (!) 169.4 kg (373 lb 8 oz), SpO2 94%. O2 Device: None (Room air) Oxygen Delivery: 3 LPM  Vitals:    11/12/23 0706 11/13/23 0605 11/14/23 0630   Weight: (!) 174.7 kg (385 lb 3.2 oz) (!) 171.6 kg (378 lb 3.2 oz) (!) 169.4 kg (373 lb 8 oz)     Vital Signs with Ranges  Temp:  [98.4  F (36.9  C)-99.1  F (37.3  C)] 99.1  F (37.3  C)  Pulse:  [75-90] 82  Resp:  [18-20] 18  BP: (141-155)/(78-98) 148/85  SpO2:  [88 %-97 %] 94 %  Patient Vitals for the past 24 hrs:   BP Temp Temp src Pulse Resp SpO2 Weight   11/14/23 0747 (!) 148/85 99.1  F (37.3  C) Oral 82 18 94 % --   11/14/23 0741 -- -- -- -- -- 96 % --   11/14/23 0740 -- -- -- -- -- 93 % --   11/14/23 0739 -- -- -- -- -- 94 % --   11/14/23 0738 -- -- -- -- -- 97 % --   11/14/23 0737 -- -- -- -- -- 92 % --   11/14/23 0736 -- -- -- -- -- 93 % --   11/14/23 0735 -- -- -- -- -- 93 % --   11/14/23 0734 -- -- -- -- -- 91 % --   11/14/23 0733 -- -- -- -- -- 90 % --   11/14/23 0732 -- -- -- -- -- 91 % --   11/14/23 0731 -- -- -- -- -- 94 % --   11/14/23 0730 -- -- -- -- -- 92 % --   11/14/23 0729 -- -- -- -- -- 92 % --   11/14/23 0727 -- -- -- -- -- 91 % --   11/14/23 0726 -- -- -- -- -- 91 % --   11/14/23 0725 -- -- -- -- -- 90 % --   11/14/23 0724 -- -- -- -- -- 92 % --   11/14/23 0723 -- -- -- -- -- 91 % --   11/14/23 0722 -- -- -- -- -- 92 % --   11/14/23 0721 -- -- -- -- -- 93 % --   11/14/23 0720 -- -- -- -- -- 90 % --   11/14/23 0719 -- -- -- -- -- 92 % --   11/14/23 0718 -- -- -- -- -- 92 % --   11/14/23 0717 -- -- -- -- -- 91 % --   11/14/23 0716 " -- -- -- -- -- 91 % --   11/14/23 0715 -- -- -- -- -- 91 % --   11/14/23 0714 -- -- -- -- -- (!) 89 % --   11/14/23 0713 -- -- -- -- -- 92 % --   11/14/23 0712 -- -- -- -- -- 91 % --   11/14/23 0711 -- -- -- -- -- 91 % --   11/14/23 0710 -- -- -- -- -- 92 % --   11/14/23 0709 -- -- -- -- -- 90 % --   11/14/23 0708 -- -- -- -- -- 92 % --   11/14/23 0707 -- -- -- -- -- (!) 89 % --   11/14/23 0706 -- -- -- -- -- 91 % --   11/14/23 0705 -- -- -- -- -- (!) 89 % --   11/14/23 0704 -- -- -- -- -- 92 % --   11/14/23 0703 -- -- -- -- -- 92 % --   11/14/23 0702 -- -- -- -- -- 91 % --   11/14/23 0701 -- -- -- -- -- (!) 89 % --   11/14/23 0654 -- -- -- -- -- 96 % --   11/14/23 0653 -- -- -- -- -- 94 % --   11/14/23 0652 -- -- -- -- -- 94 % --   11/14/23 0651 -- -- -- -- -- 94 % --   11/14/23 0650 -- -- -- -- -- 95 % --   11/14/23 0649 -- -- -- -- -- 92 % --   11/14/23 0648 -- -- -- -- -- 95 % --   11/14/23 0647 -- -- -- -- -- 94 % --   11/14/23 0646 -- -- -- -- -- 95 % --   11/14/23 0645 -- -- -- -- -- 96 % --   11/14/23 0644 -- -- -- -- -- 95 % --   11/14/23 0643 -- -- -- -- -- 94 % --   11/14/23 0642 -- -- -- -- -- 95 % --   11/14/23 0641 -- -- -- -- -- 95 % --   11/14/23 0640 -- -- -- -- -- 94 % --   11/14/23 0639 -- -- -- -- -- 96 % --   11/14/23 0638 -- -- -- -- -- 93 % --   11/14/23 0637 -- -- -- -- -- 94 % --   11/14/23 0636 -- -- -- -- -- 94 % --   11/14/23 0635 -- -- -- -- -- 93 % --   11/14/23 0634 -- -- -- -- -- 94 % --   11/14/23 0633 -- -- -- -- -- 94 % --   11/14/23 0631 -- -- -- -- -- 92 % --   11/14/23 0630 -- -- -- -- -- 96 % (!) 169.4 kg (373 lb 8 oz)   11/14/23 0629 -- -- -- -- -- 95 % --   11/14/23 0628 -- -- -- -- -- 92 % --   11/14/23 0627 -- -- -- -- -- 92 % --   11/14/23 0626 -- -- -- -- -- 91 % --   11/14/23 0625 -- -- -- -- -- 92 % --   11/14/23 0624 -- -- -- -- -- 92 % --   11/14/23 0623 -- -- -- -- -- 92 % --   11/14/23 0622 -- -- -- -- -- 91 % --   11/14/23 0621 -- -- -- -- -- 92 % --   11/14/23  0620 -- -- -- -- -- 93 % --   11/14/23 0619 -- -- -- -- -- 93 % --   11/14/23 0618 -- -- -- -- -- 92 % --   11/14/23 0617 -- -- -- -- -- 93 % --   11/14/23 0616 -- -- -- -- -- 97 % --   11/14/23 0615 -- -- -- -- -- 96 % --   11/14/23 0614 -- -- -- -- -- 94 % --   11/14/23 0613 -- -- -- -- -- 93 % --   11/14/23 0612 -- -- -- -- -- 93 % --   11/14/23 0610 -- -- -- -- -- 94 % --   11/14/23 0609 -- -- -- -- -- 93 % --   11/14/23 0608 -- -- -- -- -- 94 % --   11/14/23 0607 -- -- -- -- -- 93 % --   11/14/23 0606 -- -- -- -- -- 93 % --   11/14/23 0604 -- -- -- -- -- 94 % --   11/14/23 0603 -- -- -- -- -- 93 % --   11/14/23 0602 -- -- -- -- -- 93 % --   11/14/23 0601 -- -- -- -- -- 93 % --   11/14/23 0600 -- -- -- -- -- 93 % --   11/14/23 0400 (!) 147/92 -- -- 79 18 -- --   11/14/23 0100 (!) 150/90 98.5  F (36.9  C) Oral 80 18 95 % --   11/14/23 0000 -- -- -- -- -- 93 % --   11/13/23 2200 -- -- -- -- -- 93 % --   11/13/23 2018 (!) 141/78 98.4  F (36.9  C) Oral 90 18 91 % --   11/13/23 1445 -- -- -- -- -- 92 % --   11/13/23 1230 (!) 147/82 -- -- 75 20 90 % --   11/13/23 1210 -- -- -- -- -- 91 % --   11/13/23 1200 (!) 146/93 -- -- 86 -- (!) 88 % --   11/13/23 1143 (!) 155/95 -- -- 86 -- 90 % --   11/13/23 1130 (!) 151/98 -- -- 85 -- (!) 89 % --   11/13/23 1100 -- -- -- -- -- 90 % --     I/O's Last 24 hours  I/O last 3 completed shifts:  In: 3 [I.V.:3]  Out: -     Constitutional: Awake, alert, oriented, pleasant.  Respiratory: Diminished in bases. No crackles or wheezes.  Cardiovascular: RRR, no m/r/g.  GI:   Skin/Integumen:   Other:        Data    Labs reviewed.  Recent Labs   Lab 11/14/23  0850 11/14/23  0618 11/14/23  0221 11/13/23  0807 11/13/23  0635 11/12/23  2108 11/12/23  1756 11/12/23  1249 11/12/23  0632 11/11/23  2200 11/11/23  1833   WBC  --  9.8  --   --   --   --   --   --  11.0  --  11.1*   HGB  --  13.7  --   --   --   --   --   --  12.0*  --  12.5*   MCV  --  88  --   --   --   --   --   --  87  --  86   PLT   "--  394  --   --   --   --   --   --  298  --  333   NA  --  139  --   --  137  --   --   --  138  --  136   POTASSIUM  --  4.5  --   --  4.1  --   --   --  3.8  --  3.7   CHLORIDE  --  105  --   --  104  --   --   --  103  --  101   CO2  --  25  --   --  24  --   --   --  23  --  21*   BUN  --  12.7  --   --  9.5  --   --   --  11.0  --  12.8   CR  --  0.77  --   --  0.69  --  0.71  --  0.72  --  0.75   ANIONGAP  --  9  --   --  9  --   --   --  12  --  14   COURTNEY  --  9.2  --   --  8.8  --   --   --  8.6  --  8.7   * 143* 139*   < > 142*   < >  --    < > 105*   < > 95   ALBUMIN  --  3.3*  --   --  3.3*  --   --   --  3.0*  --  3.0*   PROTTOTAL  --  6.7  --   --  6.6  --   --   --  6.3*  --  6.6   BILITOTAL  --  0.3  --   --  0.4  --   --   --  0.4  --  0.4   ALKPHOS  --  171*  --   --  179*  --   --   --  172*  --  184*   ALT  --  202*  --   --  216*  --   --   --  207*  --  229*   AST  --  101*  --   --  113*  --   --   --  135*  --  171*    < > = values in this interval not displayed.     Recent Labs   Lab Test 11/11/23  1833   TROPONIN T HIGH SENSITIVITY 12     Recent Labs   Lab 11/14/23  0850 11/14/23  0618 11/14/23  0221 11/13/23  2123 11/13/23  1628 11/13/23  1111   * 143* 139* 190* 143* 131*      Recent Labs   Lab Test 07/12/23  0813 04/12/23  0826   A1C 6.9* 7.4*        No results for input(s): \"INR\", \"[id number]\" in the last 168 hours.  Recent Labs   Lab 11/14/23  0618 11/12/23  0632 11/11/23 1833   WBC 9.8 11.0 11.1*   LACT  --   --  1.0   PCAL  --   --  0.61*       MICRO:  CULTURES (INCLUDING BLOOD AND URINE):  Recent Labs   Lab 11/11/23 1833   CULTURE No growth after 2 days  No growth after 2 days       Recent Results (from the past 24 hour(s))   MR Cardiac w/o Contrast    Narrative                                                     [facility]                                                     CMR Report      MRN:              [id number]                                  Name:        " TATY OLGUIN                                  :             1980-Jul-15                                  Scan Date:                                         Electronically signed by Nasim Mcqueen  14:24:55    SUMMARY   ==========================================================================================================    Clinical history: Pericardial effusion  Comparison CMR: none    Limited study- patient could not tolerate due to shortness of breath    1. The LV is normal in cavity size and wall thickness. The global systolic function is normal. The LVEF  visually is 60-65 %. There are no regional wall motion abnormalities.    2. The RV is normal in cavity size. The global systolic function is normal.     3. There is large pericardial effusion. It measures 2.4 cm over the RV free wall. No chamber collapse  noted.      CONCLUSIONS:   Limited study    1. Normal LV and RV systolic functions.  2. Large pericardial effusion. No chamber collapse noted.    Discussed with inpatient cardiology team.    CORE EXAM   ==========================================================================================================    ANATOMY   ----------------------------------------------------------------------------------------      PERICARDIUM       ----------------------------------------------          EFFUSION:  LARGE        PLEURAL EFFUSION       ----------------------------------------------   SMALL RIGHT, SMALL LEFT         SCAN INFO   ==========================================================================================================    GENERAL   ----------------------------------------------------------------------------------------      CONTRAST AGENT       ----------------------------------------------          GD CONCENTRATION:  0.5 M        VITALS       ----------------------------------------------          HEIGHT:  73.0 in          HEIGHT:  185.4 cm          WEIGHT:  378.0  lbs          WEIGHT:  171.5 kgs          BSA:  2.82 m^2        SETUP       ----------------------------------------------          REFERRING PHYSICIAN:  PROVIDER SYSTEM          ATTENDING PHYSICIAN:  BARB GUERRA   ----------------------------------------------------------------------------------------      CPT Codes      ICD10 Codes      Report generated by [employer], a product of [employer]   Cardiac Catheterization    Narrative    Prior to attempt at pericardiocentesis a bedside limited echocardiogram   was performed with the patient sitting upright on a wedge.  Compared to    (TTE had not been repeated since that time) interval improvement in   effusion size was observed in multiple views.  Although the posterior and   lateral wall did show areas of effusion depth of approximately 2cm around   the posterior and lateral walls, it measured no more than 1cm around   potential areas of entry from the apical and subxyphoid approach, which   would pose a reasonably high risk from a percutaneous approach.  Given the   overall clinical picture (the patient is hemodynamically stable,   hypertensive, improving clinically and with regards to the size of his   effusion with antibiotic therapy, currently lacks any clinical or   echocardiographic features of tamponade, and weighs nearly 400 pounds with   a large chest/abdominal circumference) the procedure was aborted.       Echocardiogram Limited    Narrative    [id number]  [id number]  [id number]  [id number]^CLAUDINE^BERNA     [facility]  Echocardiography Laboratory  [address] [place] [id number]     Name: TATY OLGUIN  MRN: [id number]  : 1980  Study Date: 2023 03:22 PM  Age: 43 yrs  Gender: Male  Patient Location: [facility]  Reason For Study: Perciardium Disease, unspecified  Ordering Physician: BERNA CHOW  Referring Physician: SYSTEM, PROVIDER NOT IN  Performed By: Ector Pratt     BSA: 2.8 m2  Height:  74 in  Weight: 378 lb  BP: 147/82 mmHg  ______________________________________________________________________________  Procedure  Limited Portable Echo Adult.  ______________________________________________________________________________  Interpretation Summary     MRI from today showed large pericardial effusion.     Limited study in the cath lab to assess for tap. Apical effusion measures 1  cms, posteriorly and around the RV free wall it is 2.3-2.5 cms.     No evidence for tamponade.     Normal biventricular size and function.  ______________________________________________________________________________  ______________________________________________________________________________  Report approved by: Nadia Hart 11/13/2023 04:26 PM     ______________________________________________________________________________          Medications   All medications were reviewed.    Infusions:    Scheduled Medications:   amLODIPine  5 mg Oral Daily    azithromycin  250 mg Oral Daily    cefTRIAXone  2 g Intravenous Q24H    [Held by provider] enoxaparin ANTICOAGULANT  40 mg Subcutaneous Q12H    gadobutrol  21 mL Intravenous Once    insulin aspart  1-7 Units Subcutaneous TID AC    insulin aspart  1-5 Units Subcutaneous At Bedtime    saline nasal   Each Nare 4x Daily    sodium chloride (PF)  3 mL Intracatheter Q8H    sodium chloride (PF)   mL Intravenous Once    sodium chloride bacteriostatic  10 mL Intravenous Once    valsartan  160 mg Oral Daily     PRN Medications:  acetaminophen **OR** acetaminophen, glucose **OR** dextrose **OR** glucagon, hydrALAZINE, lidocaine 4%, lidocaine (buffered or not buffered), melatonin, ondansetron **OR** ondansetron, oxymetazoline, sodium chloride, sodium chloride (PF)

## 2023-11-15 ENCOUNTER — TELEPHONE (OUTPATIENT)
Dept: CARDIOLOGY | Facility: CLINIC | Age: 43
End: 2023-11-15
Payer: COMMERCIAL

## 2023-11-15 ENCOUNTER — PATIENT OUTREACH (OUTPATIENT)
Dept: CARE COORDINATION | Facility: CLINIC | Age: 43
End: 2023-11-15
Payer: COMMERCIAL

## 2023-11-15 NOTE — PROGRESS NOTES
[facility]    Background: Transitional Care Management program identified per system criteria and reviewed by [facility] team for possible outreach.    Assessment: Upon chart review, CCR Team member will not proceed with patient outreach related to this episode of Transitional Care Management program due to reason below:    Patient has active communication with a nurse, provider or care team for reason of post-hospital follow up plan.  Outreach call by CCR team not indicated to minimize duplicative efforts.     Plan: Transitional Care Management episode addressed appropriately per reason noted above.      Patrizia Fang RN  [facility] Resource [facility]    *Connected Care Resource Team does NOT follow patient ongoing. Referrals are identified based on internal discharge reports and the outreach is to ensure patient has an understanding of their discharge instructions.

## 2023-11-15 NOTE — TELEPHONE ENCOUNTER
Patient was admitted to [facility] on 11/11/2023 who was admitted from [facility] for management of community-acquired pneumonia and a pericardial effusion without initial evidence of tamponade. PTA, had 1 week history of viral URI symptoms, negative for COVID on 11/7/2023. Cardiology is consulted due to a moderate pericardial effusion noted on imaging.      PMH: obesity, DM, HTN, HLD.    11/12/23: Echo showed EF of  60-65%; mild TR; moderate pericardial effusion, no signs of tamponade.    11/13/23: cMRI showed normal LV and RV systolic functions; large pericardial effusion, no chamber collapse noted. Diagnostic and therapeutic pericardiocentesis ordered; however interventionalist felt benefits were outweighed by risks as it was felt it would be difficult given the location of the effusion and pt's habitus/obesity.     11/14/23:  seen by CV Surgery. Limited echo showed large pericardial effusion, no significant changes, no signs of tamponade. Plan trial of Colchicine and Ibuprofen with repeat echo in 2-4 weeks; if still has a large effusion, may need consideration for pericardiocentesis or pericardial window.    Pt was started on ABX, Protonix, Colchicine. PTA Revatio discontinued.    Pt is scheduled for an echo on 12/1/23 at 1045, and an OV on 12/7/23 at 1340 with CHARLINE Alfreda Agrawal both at our [facility].    Writer attempted to call pt for a cardiology post discharge phone call, but no answer. VM left to return my call. SOPHIE Parra RN.

## 2023-11-16 LAB
BACTERIA BLD CULT: NO GROWTH
BACTERIA BLD CULT: NO GROWTH

## 2023-11-16 NOTE — TELEPHONE ENCOUNTER
Writer returned pt's phone message.    Called patient to discuss any post hospital d/c questions he may have, review medication changes, and confirm f/u appts. Patient denied any questions regarding new medications or changes to PTA medications.     Patient denied any SOB, chest pain, or light headedness.    RN confirmed with patient that he is scheduled for an echo on 12/1/23 at 1045, and an OV on 12/7/23 at 1340 with ERIKA Alfreda Agrawal both at our [facility].     Patient advised to call clinic with any cardiac related questions or concerns prior to this erika't. Patient verbalized understanding and agreed with plan. SOPHIE Parra RN.

## 2023-11-17 ENCOUNTER — OFFICE VISIT (OUTPATIENT)
Dept: FAMILY MEDICINE | Facility: CLINIC | Age: 43
End: 2023-11-17
Payer: COMMERCIAL

## 2023-11-17 VITALS
HEIGHT: 73 IN | HEART RATE: 98 BPM | RESPIRATION RATE: 15 BRPM | TEMPERATURE: 97.8 F | WEIGHT: 315 LBS | BODY MASS INDEX: 41.75 KG/M2 | SYSTOLIC BLOOD PRESSURE: 132 MMHG | DIASTOLIC BLOOD PRESSURE: 87 MMHG | OXYGEN SATURATION: 95 %

## 2023-11-17 DIAGNOSIS — J18.9 COMMUNITY ACQUIRED PNEUMONIA, UNSPECIFIED LATERALITY: ICD-10-CM

## 2023-11-17 DIAGNOSIS — E11.29 TYPE 2 DIABETES MELLITUS WITH MICROALBUMINURIA, WITHOUT LONG-TERM CURRENT USE OF INSULIN (H): ICD-10-CM

## 2023-11-17 DIAGNOSIS — Z09 HOSPITAL DISCHARGE FOLLOW-UP: Primary | ICD-10-CM

## 2023-11-17 DIAGNOSIS — I31.39 PERICARDIAL EFFUSION: ICD-10-CM

## 2023-11-17 DIAGNOSIS — R80.9 TYPE 2 DIABETES MELLITUS WITH MICROALBUMINURIA, WITHOUT LONG-TERM CURRENT USE OF INSULIN (H): ICD-10-CM

## 2023-11-17 PROCEDURE — 99214 OFFICE O/P EST MOD 30 MIN: CPT | Performed by: PHYSICIAN ASSISTANT

## 2023-11-17 ASSESSMENT — PAIN SCALES - GENERAL: PAINLEVEL: NO PAIN (0)

## 2023-11-17 NOTE — PROGRESS NOTES
Assessment and Plan:     (Z09) Hospital discharge follow-up  (primary encounter diagnosis)  Comment: admitted 11/11/23-11//13/23 with CAP and AHRF and large pericardial effusion not amenable to pericardiocentesis, no signs of tamponade, symptoms steadily improving  Plan: follow-up echo and cards as scheduled, cont colchicine, discussed when to be seen promptly     (J18.9) Community acquired pneumonia, unspecified laterality  Comment: RLL, treated with zithromax and rocephin, on ceftin and tolerating well, no fever/chills, no sob  Plan: finish ceftin    (I31.39) Pericardial effusion  Comment: see above, has follow-up echo scheduled and cards follow-up  Plan:     (E11.29,  R80.9) Type 2 diabetes mellitus with microalbuminuria, without long-term current use of insulin (H)  Comment: last A1c 6.9  Plan: Hepatic panel (Albumin, ALT, AST, Bili, Alk         Phos, TP)      Geri Salazar PA-C  30 minutes on the day of the encounter doing chart review, history and exam, documentation and further activities as noted above.      Ady Hale is a 43 year old, presenting for the following health issues:  Hospital F/U      \A Chronology of [place] Hospitals\""       Hospital Follow-up Visit:    Hospital/Nursing Home/IP Rehab Facility: [facility]  Date of Admission: 11/11/2023  Date of Discharge: 11/14/2023  Reason(s) for Admission: Pneumonia    Was your hospitalization related to COVID-19? No   Problems taking medications regularly:  None  Medication changes since discharge:   Started taking:  -cefuroxime (CEFTIN) 500 MG tablet cefuroxime (CEFTIN) 500 MG tablet   -colchicine (COLCRYS) 0.6 MG tablet   -ibuprofen (ADVIL/MOTRIN) 600 MG tablet   -pantoprazole (PROTONIX) 40 MG EC tablet   Problems adhering to non-medication therapy:  None    Summary of hospitalization:  [facility] discharge summary reviewed  Diagnostic Tests/Treatments reviewed.  Follow up needed: see below  Other Healthcare Providers Involved in  Patient s Care:          see below  Update since discharge: improved.         Plan of care communicated with patient           Geoffrey is here for hospital follow-up  The following is from his discharge summary:      Admission History:      Please see the H&P by Yolanda Sanchez DO on 11/11/2023 for complete details. Briefly, Geoffrey Wilson is a 43 year old male with past medical history of hypertension, hyperlipidemia and type 2 diabetes that is well managed who was admitted from [place] ER on 11/11/2023 for management of community-acquired pneumonia and a pericardial effusion without initial evidence of tamponade.     PTA, had 1 week history of viral URI symptoms, negative for COVID on 11/7/2023  Presented to [place] ED day of admission 11/11/2023 with a 3-day history of worsening dyspnea on exertion and fatigue. Some shortness of breath at rest but significantly exacerbated with minimal activity.  Occasional cough minimal sputum production at this point.   He did note he coughed up some blood earlier in the day, but this has not persisted.  No chest pain. Appetite had been poor but no associated nausea or vomiting. In ED, was afebrile, tachycardic with heart rates 110, blood pressure stable, O2 sats low 90s.  WBC 12.0, lactate 1.4; lytes renal function and hemoglobin all stable.  LFTs mildly elevated.  CT chest was negative for PE; showed dense left lower lobe and right lower lobe consolidations.; also found to have a moderate pericardial effusion.      Transferred to [facility] 11/11/2023 for management.           Problem Oriented Hospital Course:         Acute hypoxic respiratory failure secondary to bilateral CAP, organism unspecified.  Recent viral URI.  * Initial presentation as above. BC's from 11/11 NGTD. Started on azithromycin and ceftriaxone in the ED.  * Transferred to [facility] 11/11. Procalcitonin 0.61, lactate normal, WBC 11.1, viral panel negative on admit.  * On 11/13, on RA.  * On  11/14, stable on RA, some decreased exertional tolerance.  - Discharge on cefuroxime to stop after 11/20.  - Continue to monitor cultures     Large pericardial effusion.  Prior history of small pericardial in 2018  * Stress echo done on 9/2018 showed small pericardial effusion.  * Last echo done on 10/10/2018 showed small pericardial effusion which was less than 1 cm in circumferential.  * Initial presentation as above.  * On 11/12, echo showed LVEF 60-65%; mild TR; moderate pericardial effusion, no signs of tamponade. Cardiology consulted on admit.   * On 11/13, cMRI showed normal LV and RV systolic functions; large pericardial effusion, no chamber collapse noted. Diagnostic and therapeutic pericardiocentesis ordered; however interventionalist felt benefits were outweighed by risks as it was felt it would be difficult given the location of the effusion and pt's habitus/obesity.  * On 11/14, seen by CV Surgery. Limited echo showed large pericardial effusion, no significant changes, no signs of tamponade.  - Plan trial of colchicine and ibuprofen with repeat echo in 2-4 weeks; if still has a large effusion, may need consideration for pericardiocentesis or pericardial window.  - Follow-up with Cardiology.     Epistaxis.  * RRT called for epistaxis 11/13. Improved with pressure with tissues and Afrin nasal spray.  * On 11/14, stable, no issues.     Hypertension (benign essential).  Hyperlipidemia.  [PTA: amlodipine-valsartan 5-160 mg daily.]  - Continue PTA regimen at discharge.     DM II, well managed.  [PTA: empagliflozin 25 mg daily; glipizide 20 mg daily; metformin 1000 mg BID; semaglutide 2 mg subcutaneous q7d on Tues.]  * A1c 6.9 in 7/2023.   * PTA meds held on admit.  - Continue PTA regimen at discharge.     Abnormal LFTs likely due to enlarged fatty liver.  * Initial presentation as above. LFT's 11/11 showed , , , tbili normal.  * On admit, noted no prior record of LFTs in "[employer]  "or Care Everywhere ([facility] or [facility]).  Unclear if findings represent acute change versus chronic abnormality. Concern for possible component of fatty liver given BMI vs due to Ozempic use vs due to statin vs due to other etiology?  * US of the abdomen 11/12 showed fatty liver and normal gallbladder.          Recent Labs   Lab 11/14/23  0618 11/13/23  0635 11/12/23  1756 11/12/23  0632 11/11/23  1833   ALBUMIN 3.3* 3.3*  --  3.0* 3.0*   BILITOTAL 0.3 0.4  --  0.4 0.4   * 216*  --  207* 229*   * 113*  --  135* 171*   ALKPHOS 171* 179*  --  172* 184*   CR 0.77 0.69 0.71 0.72 0.75   - Continue to monitor LFT's.  - Patient instructed to lose weight and follow as outpatient with his primary care physician for repeat LFTs.     Chronic anemia.  * Last hemoglobin 1 year back was 12.5.  * Hgb 12.5 on admit.  * Hgb normalized.  - Continue to monitor CBC outpatient.     Severe obesity.  * Body mass index is 49.9 kg/m  (pended).  - Needs to pursue aggressive dietary and lifestyle modifications.  - Resume semaglutide at discharge.    Geoffrey has been doing well at home  He notes he has been feeling a little better everyday  He hasn't had any issues with medications   He is still on colchicine and ceftin  He denies chest pain, sob, fever/chills    He does not drink much maybe 1-2 x per month, he does not use a lot of tylenol     His pcp is in [place]  He has repeat echo schedule don 12/1/23  He sees cardiology in December    Review of Systems   See above      Objective    /87 (BP Location: Left arm, Patient Position: Sitting, Cuff Size: Adult Regular)   Pulse 98   Temp 97.8  F (36.6  C) (Oral)   Resp 15   Ht 1.854 m (6' 1\")   Wt (!) 171.8 kg (378 lb 12.8 oz)   SpO2 95%   BMI 49.98 kg/m    Body mass index is 49.98 kg/m .    Physical Exam     GENERAL: healthy, alert and no distress  ENT: mmm, op clear   RESP: lungs clear to auscultation - no rales, no rhonchi, no wheezes  CV: regular rates and " rhythm, normal S1 S2, no S3 or S4 and no murmur, no click or rub   MS: extremities- no gross deformities noted, no edema  SKIN: no suspicious lesions, no rashes

## 2023-11-17 NOTE — PATIENT INSTRUCTIONS
Continue current medications    Have labs drawn in 1-2 weeks     Keep echocardiogram and cardiology follow-ups

## 2023-11-17 NOTE — LETTER
November 17, 2023      Geoffrey Wilson  [address] [facility] [address]        To Whom It May Concern:    Geoffrey Wilson was seen in our clinic on 11/17/23. He may return to work without restrictions on 11/20/23.      Sincerely,        Geri Salazar PA-C

## 2023-11-21 ENCOUNTER — TRANSFERRED RECORDS (OUTPATIENT)
Dept: HEALTH INFORMATION MANAGEMENT | Facility: CLINIC | Age: 43
End: 2023-11-21
Payer: COMMERCIAL

## 2023-11-21 LAB — RETINOPATHY: NEGATIVE

## 2023-11-25 ENCOUNTER — LAB (OUTPATIENT)
Dept: LAB | Facility: CLINIC | Age: 43
End: 2023-11-25
Payer: COMMERCIAL

## 2023-11-25 DIAGNOSIS — R80.9 TYPE 2 DIABETES MELLITUS WITH MICROALBUMINURIA, WITHOUT LONG-TERM CURRENT USE OF INSULIN (H): ICD-10-CM

## 2023-11-25 DIAGNOSIS — E11.29 TYPE 2 DIABETES MELLITUS WITH MICROALBUMINURIA, WITHOUT LONG-TERM CURRENT USE OF INSULIN (H): ICD-10-CM

## 2023-11-25 LAB
ALBUMIN SERPL BCG-MCNC: 4.2 G/DL (ref 3.5–5.2)
ALP SERPL-CCNC: 76 U/L (ref 40–150)
ALT SERPL W P-5'-P-CCNC: 68 U/L (ref 0–70)
AST SERPL W P-5'-P-CCNC: 31 U/L (ref 0–45)
BILIRUB DIRECT SERPL-MCNC: <0.2 MG/DL (ref 0–0.3)
BILIRUB SERPL-MCNC: 0.3 MG/DL
PROT SERPL-MCNC: 6.8 G/DL (ref 6.4–8.3)

## 2023-11-25 PROCEDURE — 36415 COLL VENOUS BLD VENIPUNCTURE: CPT

## 2023-11-25 PROCEDURE — 80076 HEPATIC FUNCTION PANEL: CPT

## 2023-11-28 NOTE — RESULT ENCOUNTER NOTE
Patrick Hale,    I'm happy to report the results of your liver panel were normal.      Please let us know if you have any questions or concerns.    Regards,  Geri Salazar PA-C

## 2023-11-29 ENCOUNTER — MYC REFILL (OUTPATIENT)
Dept: FAMILY MEDICINE | Facility: CLINIC | Age: 43
End: 2023-11-29

## 2023-11-29 ENCOUNTER — NURSE TRIAGE (OUTPATIENT)
Dept: FAMILY MEDICINE | Facility: CLINIC | Age: 43
End: 2023-11-29

## 2023-11-29 ENCOUNTER — OFFICE VISIT (OUTPATIENT)
Dept: FAMILY MEDICINE | Facility: CLINIC | Age: 43
End: 2023-11-29
Payer: COMMERCIAL

## 2023-11-29 VITALS
RESPIRATION RATE: 19 BRPM | WEIGHT: 315 LBS | BODY MASS INDEX: 41.75 KG/M2 | HEIGHT: 73 IN | DIASTOLIC BLOOD PRESSURE: 90 MMHG | SYSTOLIC BLOOD PRESSURE: 140 MMHG | OXYGEN SATURATION: 97 % | HEART RATE: 104 BPM | TEMPERATURE: 98.4 F

## 2023-11-29 DIAGNOSIS — N18.2 CHRONIC KIDNEY DISEASE, STAGE 2 (MILD): ICD-10-CM

## 2023-11-29 DIAGNOSIS — E11.29 TYPE 2 DIABETES MELLITUS WITH MICROALBUMINURIA, WITHOUT LONG-TERM CURRENT USE OF INSULIN (H): ICD-10-CM

## 2023-11-29 DIAGNOSIS — E66.01 CLASS 3 SEVERE OBESITY DUE TO EXCESS CALORIES WITH SERIOUS COMORBIDITY AND BODY MASS INDEX (BMI) OF 45.0 TO 49.9 IN ADULT (H): ICD-10-CM

## 2023-11-29 DIAGNOSIS — I10 BENIGN ESSENTIAL HYPERTENSION: Primary | ICD-10-CM

## 2023-11-29 DIAGNOSIS — E78.5 HYPERLIPIDEMIA LDL GOAL <100: ICD-10-CM

## 2023-11-29 DIAGNOSIS — R80.9 TYPE 2 DIABETES MELLITUS WITH MICROALBUMINURIA, WITHOUT LONG-TERM CURRENT USE OF INSULIN (H): ICD-10-CM

## 2023-11-29 DIAGNOSIS — E66.813 CLASS 3 SEVERE OBESITY DUE TO EXCESS CALORIES WITH SERIOUS COMORBIDITY AND BODY MASS INDEX (BMI) OF 45.0 TO 49.9 IN ADULT (H): ICD-10-CM

## 2023-11-29 PROCEDURE — 99214 OFFICE O/P EST MOD 30 MIN: CPT | Performed by: FAMILY MEDICINE

## 2023-11-29 RX ORDER — AMLODIPINE AND VALSARTAN 10; 160 MG/1; MG/1
1 TABLET ORAL DAILY
Qty: 90 TABLET | Refills: 0 | Status: SHIPPED | OUTPATIENT
Start: 2023-11-29 | End: 2024-03-26

## 2023-11-29 ASSESSMENT — PAIN SCALES - GENERAL: PAINLEVEL: MILD PAIN (2)

## 2023-11-29 NOTE — PROGRESS NOTES
"  Assessment & Plan     Diagnoses and all orders for this visit:  Benign essential hypertension; Needs improvement, weight loss, exercise and DASH diet encouraged. I increased his amlodipine from 5 mg to 10 mg daily and to continue on current dose of valsartan.  Type 2 diabetes mellitus with microalbuminuria, without long-term current use of insulin (H)  -     empagliflozin (JARDIANCE) 25 MG TABS tablet; Take 1 tablet (25 mg) by mouth daily  Chronic kidney disease, stage 2 (mild)  Hyperlipidemia LDL goal <100  -     Basic metabolic panel  (Ca, Cl, CO2, Creat, Gluc, K, Na, BUN); Future  Class 3 severe obesity due to excess calories with serious comorbidity and body mass index (BMI) of 45.0 to 49.9 in adult (H)  -     Basic metabolic panel  (Ca, Cl, CO2, Creat, Gluc, K, Na, BUN); Future  Other orders  -     amLODIPine-valsartan (EXFORGE)  MG tablet; Take 1 tablet by mouth daily     BMI:   Estimated body mass index is 50.93 kg/m  as calculated from the following:    Height as of this encounter: 1.854 m (6' 1\").    Weight as of this encounter: 175.1 kg (386 lb).   Weight management plan: Discussed healthy diet and exercise guidelines        Kylie Smith MD  [facility] PB Hale is a 43 year old, presenting for the following health issues:  Hypertension        11/29/2023    10:46 AM   Additional Questions   Roomed by VE     Patient new to me with concerns for elevated blood pressure which is asymptomatic.  He has a medical history of type 2 diabetes, benign essential hypertension, hyperlipidemia, obesity;  Recent hospital admission 11/11 to 11/14/2023.  Acute hypoxic respiratory failure secondary to bilateral CAP, organism unspecified--> Improved  2. Large pericardial effusion--> established with cardiology, on colchicine  3. Epistaxis.---> Resolved  4. Abnormal LFTs likely due to enlarged fatty liver.--> Improved  5. Anemia, question chronic or dilutional.--> Stable  6. " "Recent viral upper respiratory infection.--> Resolved  7. Prior history of small pericardial in 2018.       History of Present Illness       Hypertension: He presents for follow up of hypertension.  He does not check blood pressure  regularly outside of the clinic. Outside blood pressures have been over 140/90. He does not follow a low salt diet.     He eats 2-3 servings of fruits and vegetables daily.He consumes 2 sweetened beverage(s) daily.He exercises with enough effort to increase his heart rate 10 to 19 minutes per day.  He exercises with enough effort to increase his heart rate 4 days per week.   He is taking medications regularly.       Hypertension Follow-up    Do you check your blood pressure regularly outside of the clinic? Has not been checking but checked this morning  Are you following a low salt diet? No  Are your blood pressures ever more than 140 on the top number (systolic) OR more   than 90 on the bottom number (diastolic), for example 140/90? Yes      Review of Systems     Constitutional, HEENT, cardiovascular, pulmonary, GI, , musculoskeletal, neuro, skin, endocrine and psych systems are negative, except as otherwise noted.      Objective    BP (!) 140/90   Pulse 104   Temp 98.4  F (36.9  C) (Temporal)   Resp 19   Ht 1.854 m (6' 1\")   Wt (!) 175.1 kg (386 lb)   SpO2 97%   BMI 50.93 kg/m    Body mass index is 50.93 kg/m .  Physical Exam     GENERAL: healthy, alert and no distress  EYES: Eyes grossly normal to inspection, PERRL and conjunctivae and sclerae normal  HENT: ear canals and TM's normal, nose and mouth without ulcers or lesions  NECK: no adenopathy, no asymmetry, masses, or scars and thyroid normal to palpation  RESP: lungs clear to auscultation - no rales, rhonchi or wheezes  CV: regular rate and rhythm, normal S1 S2, no S3 or S4, no murmur, click or rub, no peripheral edema and peripheral pulses strong  ABDOMEN: soft, nontender, no hepatosplenomegaly, no masses and bowel " sounds normal  NEURO: Normal strength and tone, mentation intact and speech normal  PSYCH: mentation appears normal, affect normal/bright

## 2023-11-29 NOTE — TELEPHONE ENCOUNTER
"Patient calling    Patient has had 2 BP readings today that are elevated  He is having no cardiac or neurological symptoms.  He has not missed any doses of BP meds.     Per protocol, patient should be seen today, so RN warm transferred central scheduling to get patient scheduled for today    He is going to head over to appt now. RN went over when to instead call 911 and patient verbalized understanding    Reason for Disposition   Systolic BP >= 180 OR Diastolic >= 110    Additional Information   Negative: Sounds like a life-threatening emergency to the triager   Negative: Pregnant > 20 weeks or postpartum (< 6 weeks after delivery) and new hand or face swelling   Negative: Pregnant > 20 weeks and BP > 140/90   Negative: Systolic BP >= 160 OR Diastolic >= 100, and any cardiac or neurologic symptoms (e.g., chest pain, difficulty breathing, unsteady gait, blurred vision)   Negative: Patient sounds very sick or weak to the triager   Negative: BP Systolic BP >= 140 OR Diastolic >= 90 and postpartum (from 0 to 6 weeks after delivery)   Negative: Systolic BP >= 180 OR Diastolic >= 110, and missed most recent dose of blood pressure medication    Answer Assessment - Initial Assessment Questions  1. BLOOD PRESSURE: \"What is the blood pressure?\" \"Did you take at least two measurements 5 minutes apart?\"      His BP was 155/112. He had been sitting for about 30 minutes prior to taking his BP.  When taken again while RN was on the line  BP: 159/110  HR: 107  2. ONSET: \"When did you take your blood pressure?\"      20 minutes ago.    3. HOW: \"How did you obtain the blood pressure?\" (e.g., visiting nurse, automatic home BP monitor)      Home BP  4. HISTORY: \"Do you have a history of high blood pressure?\"      yes  5. MEDICATIONS: \"Are you taking any medications for blood pressure?\" \"Have you missed any doses recently?\"      Amlodipine, he has not missed any doses  6. OTHER SYMPTOMS: \"Do you have any symptoms?\" (e.g., headache, chest " "pain, blurred vision, difficulty breathing, weakness)      No symptoms  7. PREGNANCY: \"Is there any chance you are pregnant?\" \"When was your last menstrual period?\"      N/A    Protocols used: High Blood Pressure-A-JERAMY Robles RN    "

## 2023-12-01 ENCOUNTER — HOSPITAL ENCOUNTER (OUTPATIENT)
Dept: CARDIOLOGY | Facility: CLINIC | Age: 43
Discharge: HOME | End: 2023-12-01
Attending: PHYSICIAN ASSISTANT | Admitting: PHYSICIAN ASSISTANT
Payer: COMMERCIAL

## 2023-12-01 DIAGNOSIS — I31.39 PERICARDIAL EFFUSION: ICD-10-CM

## 2023-12-01 LAB — LVEF ECHO: NORMAL

## 2023-12-01 PROCEDURE — 93321 DOPPLER ECHO F-UP/LMTD STD: CPT | Mod: 26 | Performed by: INTERNAL MEDICINE

## 2023-12-01 PROCEDURE — 93321 DOPPLER ECHO F-UP/LMTD STD: CPT

## 2023-12-01 PROCEDURE — 93325 DOPPLER ECHO COLOR FLOW MAPG: CPT

## 2023-12-01 PROCEDURE — 93325 DOPPLER ECHO COLOR FLOW MAPG: CPT | Mod: 26 | Performed by: INTERNAL MEDICINE

## 2023-12-01 PROCEDURE — 93308 TTE F-UP OR LMTD: CPT | Mod: 26 | Performed by: INTERNAL MEDICINE

## 2023-12-07 ENCOUNTER — OFFICE VISIT (OUTPATIENT)
Dept: CARDIOLOGY | Facility: CLINIC | Age: 43
End: 2023-12-07
Attending: PHYSICIAN ASSISTANT
Payer: COMMERCIAL

## 2023-12-07 VITALS
BODY MASS INDEX: 50.66 KG/M2 | SYSTOLIC BLOOD PRESSURE: 134 MMHG | WEIGHT: 315 LBS | HEART RATE: 93 BPM | DIASTOLIC BLOOD PRESSURE: 78 MMHG | RESPIRATION RATE: 16 BRPM | OXYGEN SATURATION: 97 %

## 2023-12-07 DIAGNOSIS — I31.39 PERICARDIAL EFFUSION: ICD-10-CM

## 2023-12-07 DIAGNOSIS — E78.5 HYPERLIPIDEMIA LDL GOAL <100: Chronic | ICD-10-CM

## 2023-12-07 DIAGNOSIS — I10 BENIGN ESSENTIAL HYPERTENSION: Primary | ICD-10-CM

## 2023-12-07 PROCEDURE — 99214 OFFICE O/P EST MOD 30 MIN: CPT | Performed by: NURSE PRACTITIONER

## 2023-12-07 NOTE — LETTER
12/7/2023    David Deutsch PA-C  [address]  [place] [address]    RE: Geoffrey Wilson       Dear Colleague,     I had the pleasure of seeing Geoffrey Wilson in the [facility].  Cardiology Clinic Progress Note  Geoffrey Wilson MRN# [id number]   YOB: 1980 Age: 43 year old      Primary Cardiologist:   Dr. Clifton   Patient presents today for hospital follow-up        History of Presenting Illness:      Geoffrey Wilson is a pleasant 43 year old patient with a past cardiac history significant for   Pericardial effusion  Large pericardial effusion 11/2023  Etiology: Likely from infection as he had concurrent pneumonia  Follow-up echo 12/2023 showing small effusion but reader reported this as similar- will repeat CMR  Benign essential hypertension  Hyperlipidemia LDL goal <100  Past medical history significant for DM type II, obesity.   He is  and has a daughter born in 2023.           He was hospitalized on 11/11/2023 with pericardial effusion.  He originally presented to [facility] and found to have pneumonia with pericardial effusion and no evidence of tamponade.  Prior to admission he had a week of viral upper respiratory symptoms.  He had worsening dyspnea on exertion and fatigue and shortness of breath at rest.  He was noted to have a history of small circumferential pericardial effusion in 2018, which was less than 1 cm.  He underwent cardiac MRI on 11/13/2023 which showed normal biventricular function, large pericardial effusion.  Interventionalists felt the location of the fusion was difficult along with patient's body habitus and the risk would be higher.  He was seen by CV surgery on 11/14/2023 and plan was for trial of colchicine and ibuprofen with repeating echo in 2 to 4 weeks.  If he continues with large effusion may need to consider pericardiocentesis or pericardial window.  It was recommended that he avoid physical activity of moderate or higher intensity  until the pericardial effusion has been addressed.    Most recent lipid profile, BMP, ALT, hemoglobin reviewed today.Limited echo 12/1/2023 shows only a small circumferential pericardial effusion about 1.5 cm, no evidence of tamponade.  Per the reader, effusion appears similar to prior echo from 11/14/2023.  Results reviewed today.  This was reviewed by Dr. Clifton who recommends repeating cardiac MRI to compare pericardial effusion.  If the effusion has worsened would follow back up with CV surgery.  If effusion is stable recommend reassessing with another cardiac MRI in 3 months.    He denies any side effects with colchicine and ibuprofen.  His shortness of breath has improved after recovering from the pneumonia.  Weights have been stable and denies any heart failure symptoms.  Blood pressure is controlled.  His ALT was previously elevated and has since normalized.  He has remained on statin.  He is having lipids rechecked through primary care next month.  He is agreeable to cardiac MRI however he felt claustrophobic during his last 1 and is agreeable to arriving early for antianxiolytic medication.  He would like to have the testing done prior to the end of the year as he has reached his deductible.  Patient reports no chest pain, shortness of breath, PND, orthopnea, presyncope, syncope, edema, heart racing, or palpitations.    Patient prefers to have follow-up with Dr. Clifton in [place] or [place], even though he is closer to [place].                      Assessment and Plan:       Plan  Patient Instructions   Medication Changes:  None     Recommendations:  Check blood pressure at least 1 hour after medications. Call the clinic if your blood pressure is consistently greater than 130/80.     Follow-up:  Cardiac MRI - arrrive 1 hr early for medication  Cardiology follow up at [facility]: dianelys JOHN after MRI.      Cardiology Scheduling~[phone]  Cardiology Clinic RN~[phone] (Marietta MAYES, Mirella  RN)          Pericardial effusion  Asymptomatic  Continue colchicine, ibuprofen  Reassess with cardiac MRI  If stable repeat cardiac MRI 3 months later  If worsening, follow-up with CV surgery    Benign essential hypertension  Controlled  Continue current medications    Hyperlipidemia LDL goal <100  LDL controlled 2022  Reassess lipids                Respiratory:  clear to auscultation; normal symmetry        Cardiac: regular rate and rhythm     GI:  abdomen nondistended     Extremities and Muscular Skeletal:   no edema            Thank you for allowing me to participate in this delightful patient's care.   This note was completed in part using Dragon voice recognition software. Although reviewed after completion, some word and grammatical errors may occur.    ADONIS Mcqueen CNP      Thank you for allowing me to participate in the care of your patient.      Sincerely,     ADONIS Mcqueen CNP     [facility]  cc:   Bri Marie PA-C  [address]  [place] [address]

## 2023-12-07 NOTE — PATIENT INSTRUCTIONS
Medication Changes:  None     Recommendations:  Check blood pressure at least 1 hour after medications. Call the clinic if your blood pressure is consistently greater than 130/80.     Follow-up:  Cardiac MRI - arrrive 1 hr early for medication  Cardiology follow up at [facility]: dianelys JOHN after MRI.      Cardiology Scheduling~[phone]  Cardiology Clinic RN~[phone] (Marietta RN, Mirella RN)

## 2023-12-07 NOTE — PROGRESS NOTES
Cardiology Clinic Progress Note  Geoffrey Wilson MRN# [id number]   YOB: 1980 Age: 43 year old      Primary Cardiologist:   Dr. Clifton   Patient presents today for hospital follow-up        History of Presenting Illness:      Geoffrey Wilson is a pleasant 43 year old patient with a past cardiac history significant for   Pericardial effusion  Large pericardial effusion 11/2023  Etiology: Likely from infection as he had concurrent pneumonia  Follow-up echo 12/2023 showing small effusion but reader reported this as similar- will repeat CMR  Benign essential hypertension  Hyperlipidemia LDL goal <100  Past medical history significant for DM type II, obesity.   He is  and has a daughter born in 2023.           He was hospitalized on 11/11/2023 with pericardial effusion.  He originally presented to [facility] and found to have pneumonia with pericardial effusion and no evidence of tamponade.  Prior to admission he had a week of viral upper respiratory symptoms.  He had worsening dyspnea on exertion and fatigue and shortness of breath at rest.  He was noted to have a history of small circumferential pericardial effusion in 2018, which was less than 1 cm.  He underwent cardiac MRI on 11/13/2023 which showed normal biventricular function, large pericardial effusion.  Interventionalists felt the location of the fusion was difficult along with patient's body habitus and the risk would be higher.  He was seen by CV surgery on 11/14/2023 and plan was for trial of colchicine and ibuprofen with repeating echo in 2 to 4 weeks.  If he continues with large effusion may need to consider pericardiocentesis or pericardial window.  It was recommended that he avoid physical activity of moderate or higher intensity until the pericardial effusion has been addressed.    Most recent lipid profile, BMP, ALT, hemoglobin reviewed today.Limited echo 12/1/2023 shows only a small circumferential pericardial effusion about 1.5  cm, no evidence of tamponade.  Per the reader, effusion appears similar to prior echo from 11/14/2023.  Results reviewed today.  This was reviewed by Dr. Clifton who recommends repeating cardiac MRI to compare pericardial effusion.  If the effusion has worsened would follow back up with CV surgery.  If effusion is stable recommend reassessing with another cardiac MRI in 3 months.    He denies any side effects with colchicine and ibuprofen.  His shortness of breath has improved after recovering from the pneumonia.  Weights have been stable and denies any heart failure symptoms.  Blood pressure is controlled.  His ALT was previously elevated and has since normalized.  He has remained on statin.  He is having lipids rechecked through primary care next month.  He is agreeable to cardiac MRI however he felt claustrophobic during his last 1 and is agreeable to arriving early for antianxiolytic medication.  He would like to have the testing done prior to the end of the year as he has reached his deductible.  Patient reports no chest pain, shortness of breath, PND, orthopnea, presyncope, syncope, edema, heart racing, or palpitations.    Patient prefers to have follow-up with Dr. Clifton in [place] or [place], even though he is closer to [place].                      Assessment and Plan:       Plan  Patient Instructions   Medication Changes:  None     Recommendations:  Check blood pressure at least 1 hour after medications. Call the clinic if your blood pressure is consistently greater than 130/80.     Follow-up:  Cardiac MRI - arrrive 1 hr early for medication  Cardiology follow up at [facility]: dianelys JOHN after MRI.      Cardiology Scheduling~[phone]  Cardiology Clinic RN~[phone] (Marietta RN, Mirella RN)          Pericardial effusion  Asymptomatic  Continue colchicine, ibuprofen  Reassess with cardiac MRI  If stable repeat cardiac MRI 3 months later  If worsening, follow-up with CV surgery    Benign essential  hypertension  Controlled  Continue current medications    Hyperlipidemia LDL goal <100  LDL controlled 2022  Reassess lipids                Respiratory:  clear to auscultation; normal symmetry        Cardiac: regular rate and rhythm     GI:  abdomen nondistended     Extremities and Muscular Skeletal:   no edema            Thank you for allowing me to participate in this delightful patient's care.   This note was completed in part using Dragon voice recognition software. Although reviewed after completion, some word and grammatical errors may occur.    Alfreda Beasley, APRN CNP

## 2023-12-12 ENCOUNTER — MYC MEDICAL ADVICE (OUTPATIENT)
Dept: CARDIOLOGY | Facility: CLINIC | Age: 43
End: 2023-12-12
Payer: COMMERCIAL

## 2023-12-12 DIAGNOSIS — Z29.9 PROPHYLACTIC MEASURE: ICD-10-CM

## 2023-12-12 DIAGNOSIS — I31.39 PERICARDIAL EFFUSION: ICD-10-CM

## 2023-12-12 RX ORDER — COLCHICINE 0.6 MG/1
0.3 TABLET ORAL 2 TIMES DAILY
Qty: 30 TABLET | Refills: 0 | Status: SHIPPED | OUTPATIENT
Start: 2023-12-12 | End: 2024-01-04

## 2023-12-12 RX ORDER — PANTOPRAZOLE SODIUM 40 MG/1
40 TABLET, DELAYED RELEASE ORAL
Qty: 30 TABLET | Refills: 0 | Status: SHIPPED | OUTPATIENT
Start: 2023-12-12

## 2023-12-18 ENCOUNTER — TELEPHONE (OUTPATIENT)
Dept: CARDIOLOGY | Facility: CLINIC | Age: 43
End: 2023-12-18
Payer: COMMERCIAL

## 2023-12-18 NOTE — TELEPHONE ENCOUNTER
"Routing to Alfreda Agrawal CNP -     Per last clinic note -  \"He is agreeable to cardiac MRI however he felt claustrophobic during his last 1 and is agreeable to arriving early for antianxiolytic medication. \"    Pt calling in asking if he can get a rx for Ativan prior to this this. Imaging notified him he needs to reach out to the ordering provider.     Notified pt that Alfreda is out of the office until Wed. Will review with her then. If she agree's he will need to  a hard copy of rx here at clinic to bring to the pharmacy and will need a ride home from MRI after.   Pt aware he has done this all before and has a ride all set up.   Pt would like a mychart mesg once rx is ready to  at clinic .     Mirella Wagner RN    "

## 2023-12-20 NOTE — CONFIDENTIAL NOTE
Looks like patient is scheduled to have his MRI at [facility].  If so, they have a standing order and he does not need a prescription prior.  He just needs to arrive 30 to 60 minutes prior to his appointment so they can administer the medication.  I did just verify with MRI tech also.     ADONIS Mcqueen CNP

## 2024-01-02 ENCOUNTER — MYC REFILL (OUTPATIENT)
Dept: FAMILY MEDICINE | Facility: CLINIC | Age: 44
End: 2024-01-02
Payer: COMMERCIAL

## 2024-01-02 DIAGNOSIS — R80.9 TYPE 2 DIABETES MELLITUS WITH MICROALBUMINURIA, WITHOUT LONG-TERM CURRENT USE OF INSULIN (H): ICD-10-CM

## 2024-01-02 DIAGNOSIS — E11.29 TYPE 2 DIABETES MELLITUS WITH MICROALBUMINURIA, WITHOUT LONG-TERM CURRENT USE OF INSULIN (H): ICD-10-CM

## 2024-01-02 RX ORDER — GLIPIZIDE 10 MG/1
20 TABLET, FILM COATED, EXTENDED RELEASE ORAL DAILY
Qty: 180 TABLET | Refills: 0 | Status: SHIPPED | OUTPATIENT
Start: 2024-01-02 | End: 2024-03-25

## 2024-01-03 ENCOUNTER — HOSPITAL ENCOUNTER (OUTPATIENT)
Dept: CARDIOLOGY | Facility: CLINIC | Age: 44
Discharge: HOME | End: 2024-01-03
Attending: NURSE PRACTITIONER | Admitting: NURSE PRACTITIONER
Payer: COMMERCIAL

## 2024-01-03 DIAGNOSIS — I31.39 PERICARDIAL EFFUSION: ICD-10-CM

## 2024-01-03 PROCEDURE — 255N000002 HC RX 255 OP 636: Performed by: NURSE PRACTITIONER

## 2024-01-03 PROCEDURE — 250N000013 HC RX MED GY IP 250 OP 250 PS 637: Performed by: INTERNAL MEDICINE

## 2024-01-03 PROCEDURE — A9585 GADOBUTROL INJECTION: HCPCS | Performed by: NURSE PRACTITIONER

## 2024-01-03 PROCEDURE — 75561 CARDIAC MRI FOR MORPH W/DYE: CPT | Mod: 26 | Performed by: INTERNAL MEDICINE

## 2024-01-03 PROCEDURE — 75561 CARDIAC MRI FOR MORPH W/DYE: CPT

## 2024-01-03 RX ORDER — ACYCLOVIR 200 MG/1
0-1 CAPSULE ORAL
Status: DISCONTINUED | OUTPATIENT
Start: 2024-01-03 | End: 2024-01-04 | Stop reason: HOSPADM

## 2024-01-03 RX ORDER — METHYLPREDNISOLONE SODIUM SUCCINATE 125 MG/2ML
125 INJECTION, POWDER, LYOPHILIZED, FOR SOLUTION INTRAMUSCULAR; INTRAVENOUS
Status: DISCONTINUED | OUTPATIENT
Start: 2024-01-03 | End: 2024-01-04 | Stop reason: HOSPADM

## 2024-01-03 RX ORDER — DIPHENHYDRAMINE HCL 25 MG
25 CAPSULE ORAL
Status: DISCONTINUED | OUTPATIENT
Start: 2024-01-03 | End: 2024-01-04 | Stop reason: HOSPADM

## 2024-01-03 RX ORDER — GADOBUTROL 604.72 MG/ML
23 INJECTION INTRAVENOUS ONCE
Status: COMPLETED | OUTPATIENT
Start: 2024-01-03 | End: 2024-01-03

## 2024-01-03 RX ORDER — DIPHENHYDRAMINE HYDROCHLORIDE 50 MG/ML
25-50 INJECTION INTRAMUSCULAR; INTRAVENOUS
Status: DISCONTINUED | OUTPATIENT
Start: 2024-01-03 | End: 2024-01-04 | Stop reason: HOSPADM

## 2024-01-03 RX ORDER — ONDANSETRON 2 MG/ML
4 INJECTION INTRAMUSCULAR; INTRAVENOUS
Status: DISCONTINUED | OUTPATIENT
Start: 2024-01-03 | End: 2024-01-04 | Stop reason: HOSPADM

## 2024-01-03 RX ORDER — DIAZEPAM 5 MG
5 TABLET ORAL EVERY 30 MIN PRN
Status: COMPLETED | OUTPATIENT
Start: 2024-01-03 | End: 2024-01-03

## 2024-01-03 RX ADMIN — GADOBUTROL 23 ML: 604.72 INJECTION INTRAVENOUS at 11:38

## 2024-01-03 RX ADMIN — DIAZEPAM 5 MG: 5 TABLET ORAL at 09:20

## 2024-01-03 RX ADMIN — DIAZEPAM 5 MG: 5 TABLET ORAL at 09:54

## 2024-01-03 NOTE — PROGRESS NOTES
- stop taking Dayquil, you should not take this with elevated blood pressure  - You can taking Coricidin HBP for URI symptoms  - Will send swab to test for genital herpes  - follow up in 3 months for BP check    Brief cardiology update:  Cardiac MRI shows large pericardial effusion measuring 2.4 cm over the RV free wall. Will plan pericardiocentesis today. Discussed with patient and wife. Discussed risks/benefits. Consent signed. Will plan to leave pericardial drain in overnight with repeat limited echo in the morning.    OLGA Marie PA-C 3:12 PM 11/13/2023

## 2024-01-03 NOTE — RESULT ENCOUNTER NOTE
LVEF 63%; RVEF 60%; mod circumferential pericardial effusion that has marginally decreased from 11/11/23 study; IVC mildly dilated; no myocardial scar, fibrosis or infiltrative disease. Follow up with Alfreda Agrawal NP on 1/4/24

## 2024-01-04 ENCOUNTER — VIRTUAL VISIT (OUTPATIENT)
Dept: CARDIOLOGY | Facility: CLINIC | Age: 44
End: 2024-01-04
Attending: NURSE PRACTITIONER
Payer: COMMERCIAL

## 2024-01-04 DIAGNOSIS — E78.5 HYPERLIPIDEMIA LDL GOAL <100: Chronic | ICD-10-CM

## 2024-01-04 DIAGNOSIS — I10 BENIGN ESSENTIAL HYPERTENSION: ICD-10-CM

## 2024-01-04 DIAGNOSIS — I31.39 PERICARDIAL EFFUSION: ICD-10-CM

## 2024-01-04 PROCEDURE — 99214 OFFICE O/P EST MOD 30 MIN: CPT | Mod: 95 | Performed by: NURSE PRACTITIONER

## 2024-01-04 RX ORDER — COLCHICINE 0.6 MG/1
0.3 TABLET ORAL 2 TIMES DAILY
Qty: 30 TABLET | Refills: 3 | Status: SHIPPED | OUTPATIENT
Start: 2024-01-04 | End: 2024-04-24

## 2024-01-04 NOTE — PROGRESS NOTES
Geoffrey is a 43 year old who is being evaluated via a billable video visit.      How would you like to obtain your AVS? MyChart  If the video visit is dropped, the invitation should be resent by: Text to cell phone: [phone]  Will anyone else be joining your video visit? No        Video-Visit Details    Type of service:  Video Visit       Originating Location (pt. Location): Home    Distant Location (provider location):  On-site  Platform used for Video Visit: [employer]     Telephone visit: 8 minutes          Cardiology Clinic Progress Note  Geoffrey Wilson MRN# [id number]   YOB: 1980 Age: 43 year old      Primary Cardiologist:   Dr. Clifton  Patient presents today for testing follow-up        History of Presenting Illness:      Geoffrey Wilson is a pleasant 43 year old patient with a past cardiac history significant for   Pericardial effusion  Large pericardial effusion 11/2023  Etiology: Likely from infection as he had concurrent pneumonia  Follow-up echo 12/2023 showing small effusion but reader reported this as similar- will repeat CMR  CMR 1/2024 showing moderate circumferential pericardial effusion marginally decreased from 11/2023  Benign essential hypertension  Hyperlipidemia LDL goal <100  Past medical history significant for DM type II, obesity.   He is  and has a daughter born in 2023.       In November 2023 he was hospitalized for pneumonia and found to have pericardial effusion with no evidence of tamponade.  Prior to admission he had a week of viral URI symptoms.  He had worsening fatigue and shortness of breath at rest.  He had history of small circumferential pericardial effusion in 2018 less than 1 cm.  Cardiac MRI during hospitalization showed large pericardial effusion.  Interventionalists felt the location of the effusion would be difficult to perform pericardiocentesis.  He was also seen by CV surgery who did not recommend intervention and recommended trial of colchicine and  ibuprofen.  They felt that he continued with a large effusion could consider pericardiocentesis or pericardial window at that time.  They recommended avoiding physical activity of moderate or higher intensity until the effusion had been addressed.    Patient was seen by me for hospital follow-up in December 2023.  Follow-up echocardiogram was read as similar effusion but they noted this to only be a small effusion.  Given the conflicting information and CMR was recommended.  I reviewed this with Dr. Clifton who noted that if the effusion was stable would recommend reassessing with another CMR in 3 more months and if it had worsened would follow back up with CV surgery.  Patient's shortness of breath resolved.    Most recent lipid profile, BMP, ALT reviewed today. Cardiac MRI January 2024 showing normal biventricular size and function, no scar, fibrosis, or infiltrative disease, moderate circumferential pericardial effusion and mildly dilated IVC.  Per the reader, compared to prior CMR from November 2023 the pericardial effusion is marginally decreased.  Results reviewed today.     Blood pressure has been controlled.  Weights have been stable and denies any heart failure symptoms.  I again discussed avoiding moderate to high intensity physical activity.  He had some stomach irritation with taking both the colchicine and the ibuprofen so has decreased the ibuprofen to once a day and is tolerating that.  He was just diagnosed with bronchitis so is trying to recover from that. Patient reports no chest pain, shortness of breath, PND, orthopnea, presyncope, syncope, edema, heart racing, or palpitations.    Patient prefers to follow with Dr. Clifton in [place] or [place], even though he is closer to [place].                   Assessment and Plan:       Plan  Patient Instructions   Medication Changes:  None    Recommendations:  Check blood pressure at least 1 hour after medications. Call the clinic if your blood pressure is  consistently greater than 130/80.     Follow-up:  Call to schedule Cardiology follow up at [facility]: Dr. Clifton in 3 to 4 months with cardiac MRI prior.   Fasting lab within 2 months (lipid/ALT)  Arrive 60 mins prior to MRI for claustrophobia medication     Cardiology Scheduling~[phone]  Cardiology Clinic RN~[phone] (Marietta RN, Mirella RN)             Benign essential hypertension  Hyperlipidemia LDL goal <100  Pericardial effusion      Pericardial effusion  Asymptomatic  Slightly improved  Continue colchicine, ibuprofen  If stable repeat cardiac MRI 3 months later  If worsening, follow-up with CV surgery     Benign essential hypertension  Controlled   Continue current medications     Hyperlipidemia LDL goal <100  LDL controlled 2022  Reassess lipids             Thank you for allowing me to participate in this delightful patient's care.   This note was completed in part using Dragon voice recognition software. Although reviewed after completion, some word and grammatical errors may occur.    Alfreda Beasley, ADONIS CNP

## 2024-01-04 NOTE — PATIENT INSTRUCTIONS
Medication Changes:  None    Recommendations:  Check blood pressure at least 1 hour after medications. Call the clinic if your blood pressure is consistently greater than 130/80.     Follow-up:  Call to schedule Cardiology follow up at [facility]: Dr. Clifton in 3 to 4 months with cardiac MRI prior.   Fasting lab within 2 months (lipid/ALT)  Arrive 60 mins prior to MRI for claustrophobia medication     Cardiology Scheduling~[phone]  Cardiology Clinic RN~[phone] (Marietta RN, Mirella RN)

## 2024-01-04 NOTE — LETTER
1/4/2024    David Deutsch PA-C  [id number]  [place] [address]    RE: Geoffrey Wilson       Dear Colleague,     I had the pleasure of seeing Geoffrey Wilson in the [facility].  Geoffrey is a 43 year old who is being evaluated via a billable video visit.      How would you like to obtain your AVS? MyChart  If the video visit is dropped, the invitation should be resent by: Text to cell phone: [phone]  Will anyone else be joining your video visit? No        Video-Visit Details    Type of service:  Video Visit       Originating Location (pt. Location): Home    Distant Location (provider location):  On-site  Platform used for Video Visit: [employer]     Telephone visit: 8 minutes          Cardiology Clinic Progress Note  Geoffrey Wilson MRN# [id number]   YOB: 1980 Age: 43 year old      Primary Cardiologist:   Dr. Clifton  Patient presents today for testing follow-up        History of Presenting Illness:      Geoffrey Wilson is a pleasant 43 year old patient with a past cardiac history significant for   Pericardial effusion  Large pericardial effusion 11/2023  Etiology: Likely from infection as he had concurrent pneumonia  Follow-up echo 12/2023 showing small effusion but reader reported this as similar- will repeat CMR  CMR 1/2024 showing moderate circumferential pericardial effusion marginally decreased from 11/2023  Benign essential hypertension  Hyperlipidemia LDL goal <100  Past medical history significant for DM type II, obesity.   He is  and has a daughter born in 2023.       In November 2023 he was hospitalized for pneumonia and found to have pericardial effusion with no evidence of tamponade.  Prior to admission he had a week of viral URI symptoms.  He had worsening fatigue and shortness of breath at rest.  He had history of small circumferential pericardial effusion in 2018 less than 1 cm.  Cardiac MRI during hospitalization showed large pericardial effusion.   Interventionalists felt the location of the effusion would be difficult to perform pericardiocentesis.  He was also seen by CV surgery who did not recommend intervention and recommended trial of colchicine and ibuprofen.  They felt that he continued with a large effusion could consider pericardiocentesis or pericardial window at that time.  They recommended avoiding physical activity of moderate or higher intensity until the effusion had been addressed.    Patient was seen by me for hospital follow-up in December 2023.  Follow-up echocardiogram was read as similar effusion but they noted this to only be a small effusion.  Given the conflicting information and CMR was recommended.  I reviewed this with Dr. Clifton who noted that if the effusion was stable would recommend reassessing with another CMR in 3 more months and if it had worsened would follow back up with CV surgery.  Patient's shortness of breath resolved.    Most recent lipid profile, BMP, ALT reviewed today. Cardiac MRI January 2024 showing normal biventricular size and function, no scar, fibrosis, or infiltrative disease, moderate circumferential pericardial effusion and mildly dilated IVC.  Per the reader, compared to prior CMR from November 2023 the pericardial effusion is marginally decreased.  Results reviewed today.     Blood pressure has been controlled.  Weights have been stable and denies any heart failure symptoms.  I again discussed avoiding moderate to high intensity physical activity.  He had some stomach irritation with taking both the colchicine and the ibuprofen so has decreased the ibuprofen to once a day and is tolerating that.  He was just diagnosed with bronchitis so is trying to recover from that. Patient reports no chest pain, shortness of breath, PND, orthopnea, presyncope, syncope, edema, heart racing, or palpitations.    Patient prefers to follow with Dr. Clifton in [place] or [place], even though he is closer to [place].                    Assessment and Plan:       Plan  Patient Instructions   Medication Changes:  None    Recommendations:  Check blood pressure at least 1 hour after medications. Call the clinic if your blood pressure is consistently greater than 130/80.     Follow-up:  Call to schedule Cardiology follow up at [facility]: Dr. Clifton in 3 to 4 months with cardiac MRI prior.   Fasting lab within 2 months (lipid/ALT)  Arrive 60 mins prior to MRI for claustrophobia medication     Cardiology Scheduling~[phone]  Cardiology Clinic RN~[phone] (Marietta RN, Mirella RN)             Benign essential hypertension  Hyperlipidemia LDL goal <100  Pericardial effusion      Pericardial effusion  Asymptomatic  Slightly improved  Continue colchicine, ibuprofen  If stable repeat cardiac MRI 3 months later  If worsening, follow-up with CV surgery     Benign essential hypertension  Controlled   Continue current medications     Hyperlipidemia LDL goal <100  LDL controlled 2022  Reassess lipids             Thank you for allowing me to participate in this delightful patient's care.   This note was completed in part using Dragon voice recognition software. Although reviewed after completion, some word and grammatical errors may occur.    ADONIS Mcqueen CNP      Thank you for allowing me to participate in the care of your patient.      Sincerely,     ADONIS Mcqueen CNP     [facility]  cc:   ADONIS Flores CNP  [address] [place] [address]

## 2024-01-08 ASSESSMENT — ENCOUNTER SYMPTOMS
ABDOMINAL PAIN: 0
SORE THROAT: 0
PALPITATIONS: 0
CHILLS: 0
FEVER: 0
HEARTBURN: 0
HEMATOCHEZIA: 0
PARESTHESIAS: 0
NAUSEA: 0
HEMATURIA: 0
SHORTNESS OF BREATH: 0
MYALGIAS: 0
NERVOUS/ANXIOUS: 0
JOINT SWELLING: 0
ARTHRALGIAS: 0
EYE PAIN: 0
HEADACHES: 0
DIARRHEA: 0
CONSTIPATION: 0
DIZZINESS: 0
COUGH: 0
DYSURIA: 0
FREQUENCY: 0
WEAKNESS: 0

## 2024-01-11 ENCOUNTER — OFFICE VISIT (OUTPATIENT)
Dept: FAMILY MEDICINE | Facility: CLINIC | Age: 44
End: 2024-01-11
Payer: COMMERCIAL

## 2024-01-11 VITALS
BODY MASS INDEX: 41.75 KG/M2 | HEART RATE: 112 BPM | OXYGEN SATURATION: 98 % | RESPIRATION RATE: 20 BRPM | HEIGHT: 73 IN | WEIGHT: 315 LBS | TEMPERATURE: 97.6 F | DIASTOLIC BLOOD PRESSURE: 78 MMHG | SYSTOLIC BLOOD PRESSURE: 130 MMHG

## 2024-01-11 DIAGNOSIS — R80.9 TYPE 2 DIABETES MELLITUS WITH MICROALBUMINURIA, WITHOUT LONG-TERM CURRENT USE OF INSULIN (H): ICD-10-CM

## 2024-01-11 DIAGNOSIS — E78.5 HYPERLIPIDEMIA LDL GOAL <100: ICD-10-CM

## 2024-01-11 DIAGNOSIS — Z00.01 ENCOUNTER FOR ROUTINE ADULT HEALTH EXAMINATION WITH ABNORMAL FINDINGS: Primary | ICD-10-CM

## 2024-01-11 DIAGNOSIS — E66.01 MORBID OBESITY DUE TO EXCESS CALORIES (H): ICD-10-CM

## 2024-01-11 DIAGNOSIS — N18.2 CHRONIC KIDNEY DISEASE, STAGE 2 (MILD): ICD-10-CM

## 2024-01-11 DIAGNOSIS — E11.29 TYPE 2 DIABETES MELLITUS WITH MICROALBUMINURIA, WITHOUT LONG-TERM CURRENT USE OF INSULIN (H): ICD-10-CM

## 2024-01-11 LAB
CHOLEST SERPL-MCNC: 118 MG/DL
CREAT UR-MCNC: 94.4 MG/DL
FASTING STATUS PATIENT QL REPORTED: YES
HBA1C MFR BLD: 7.6 % (ref 0–5.6)
HDLC SERPL-MCNC: 36 MG/DL
LDLC SERPL CALC-MCNC: 44 MG/DL
MICROALBUMIN UR-MCNC: 84.8 MG/L
MICROALBUMIN/CREAT UR: 89.83 MG/G CR (ref 0–17)
NONHDLC SERPL-MCNC: 82 MG/DL
TRIGL SERPL-MCNC: 192 MG/DL

## 2024-01-11 PROCEDURE — 80061 LIPID PANEL: CPT | Performed by: PHYSICIAN ASSISTANT

## 2024-01-11 PROCEDURE — 82043 UR ALBUMIN QUANTITATIVE: CPT | Performed by: PHYSICIAN ASSISTANT

## 2024-01-11 PROCEDURE — 36415 COLL VENOUS BLD VENIPUNCTURE: CPT | Performed by: PHYSICIAN ASSISTANT

## 2024-01-11 PROCEDURE — 82570 ASSAY OF URINE CREATININE: CPT | Performed by: PHYSICIAN ASSISTANT

## 2024-01-11 PROCEDURE — 99214 OFFICE O/P EST MOD 30 MIN: CPT | Mod: 25 | Performed by: PHYSICIAN ASSISTANT

## 2024-01-11 PROCEDURE — 83036 HEMOGLOBIN GLYCOSYLATED A1C: CPT | Performed by: PHYSICIAN ASSISTANT

## 2024-01-11 PROCEDURE — 99396 PREV VISIT EST AGE 40-64: CPT | Performed by: PHYSICIAN ASSISTANT

## 2024-01-11 RX ORDER — ROSUVASTATIN CALCIUM 20 MG/1
20 TABLET, COATED ORAL DAILY
Qty: 90 TABLET | Refills: 3 | Status: SHIPPED | OUTPATIENT
Start: 2024-01-11 | End: 2024-08-15

## 2024-01-11 ASSESSMENT — ENCOUNTER SYMPTOMS
PALPITATIONS: 0
MYALGIAS: 0
HEARTBURN: 0
DYSURIA: 0
JOINT SWELLING: 0
HEMATOCHEZIA: 0
FREQUENCY: 0
DIARRHEA: 0
CONSTIPATION: 0
EYE PAIN: 0
SHORTNESS OF BREATH: 0
DIZZINESS: 0
FEVER: 0
SORE THROAT: 0
HEADACHES: 0
ABDOMINAL PAIN: 0
ARTHRALGIAS: 0
COUGH: 0
PARESTHESIAS: 0
NERVOUS/ANXIOUS: 0
WEAKNESS: 0
HEMATURIA: 0
CHILLS: 0
NAUSEA: 0

## 2024-01-11 ASSESSMENT — PAIN SCALES - GENERAL: PAINLEVEL: NO PAIN (0)

## 2024-01-11 NOTE — PROGRESS NOTES
"SUBJECTIVE:   Geoffrey is a 43 year old, presenting for the following:  Physical and Health Maintenance (Patient is fasting/Patient declines vaccines today)        1/11/2024     7:51 AM   Additional Questions   Roomed by Ange Cunningham CMA   Accompanied by none         1/11/2024     7:51 AM   Patient Reported Additional Medications   Patient reports taking the following new medications none       Healthy Habits:     Getting at least 3 servings of Calcium per day:  Yes    Bi-annual eye exam:  Yes    Dental care twice a year:  NO    Sleep apnea or symptoms of sleep apnea:  Sleep apnea    Diet:  Regular (no restrictions)    Frequency of exercise:  4-5 days/week    Duration of exercise:  15-30 minutes    Taking medications regularly:  Yes    Medication side effects:  None    Additional concerns today:  No    Recheck of dm and htn.   No chest pain/sob/palpitations/dizziness/ha's  Taking tolerating meds.   Some exercise  Recheck of obesity. Discussed a lower calorie diet and consistent mix of aerobic exercise and strength training. This will help maintain/treat his blood pressure.              Social History     Tobacco Use    Smoking status: Never     Passive exposure: Never    Smokeless tobacco: Never   Substance Use Topics    Alcohol use: Yes     Comment: 2 times/ month             1/8/2024    10:09 AM   Alcohol Use   Prescreen: >3 drinks/day or >7 drinks/week? No       Last PSA: No results found for: \"PSA\"    Reviewed orders with patient. Reviewed health maintenance and updated orders accordingly - Yes  Lab work is in process  Labs reviewed in EPIC  BP Readings from Last 3 Encounters:   01/11/24 130/78   12/07/23 134/78   11/29/23 (!) 140/90    Wt Readings from Last 3 Encounters:   01/11/24 (!) 170.5 kg (375 lb 12.8 oz)   12/07/23 (!) 174.2 kg (384 lb)   11/29/23 (!) 175.1 kg (386 lb)                  Patient Active Problem List   Diagnosis    Morbid obesity due to excess calories (H)    Benign essential hypertension    " Hyperlipidemia LDL goal <100    Type 2 diabetes mellitus with microalbuminuria, without long-term current use of insulin (H)    Chronic kidney disease, stage 2 (mild)    Pneumonia     Past Surgical History:   Procedure Laterality Date    CV PERICARDIOCENTESIS N/A 11/13/2023    Procedure: Pericardiocentesis;  Surgeon: Adan Coronel MD;  Location:  [facility]    HC REPAIR ING HERNIA,5+Y/O,REDUCIBL  Age 6       Social History     Tobacco Use    Smoking status: Never     Passive exposure: Never    Smokeless tobacco: Never   Substance Use Topics    Alcohol use: Yes     Comment: 2 times/ month     Family History   Problem Relation Age of Onset    Hypertension Mother     Diabetes Father     Lipids Father     Heart Disease Brother     Leukemia Brother          Current Outpatient Medications   Medication Sig Dispense Refill    amLODIPine-valsartan (EXFORGE)  MG tablet Take 1 tablet by mouth daily 90 tablet 0    colchicine (COLCRYS) 0.6 MG tablet Take 0.5 tablets (0.3 mg) by mouth 2 times daily 30 tablet 3    empagliflozin (JARDIANCE) 25 MG TABS tablet Take 1 tablet (25 mg) by mouth daily 90 tablet 0    glipiZIDE (GLUCOTROL XL) 10 MG 24 hr tablet Take 2 tablets (20 mg) by mouth daily 180 tablet 0    ibuprofen (ADVIL/MOTRIN) 600 MG tablet Take 1 tablet (600 mg) by mouth 3 times daily 42 tablet 1    metFORMIN (GLUCOPHAGE) 1000 MG tablet TAKE 1 TABLET TWICE A DAY WITH MEALS 180 tablet 0    pantoprazole (PROTONIX) 40 MG EC tablet Take 1 tablet (40 mg) by mouth every morning (before breakfast) 30 tablet 0    rosuvastatin (CRESTOR) 20 MG tablet Take 1 tablet (20 mg) by mouth daily 90 tablet 3    Semaglutide, 2 MG/DOSE, (OZEMPIC) 8 MG/3ML pen Inject 2 mg Subcutaneous every 7 days 9 mL 0    insulin pen needle (31G X 8 MM) 31G X 8 MM miscellaneous Use 1 pen needles once a week or as directed. 100 each 3    OneTouch Delica Lancets 33G MISC 1 each 2 times daily 100 each 3    ONETOUCH VERIO IQ test strip Use  to test blood sugar 1-2 times daily or as directed. 50 strip 3     Allergies   Allergen Reactions    Lisinopril Cough    Penicillins Rash     Recent Labs   Lab Test 11/25/23  0947 11/14/23  0618 11/13/23  0635 11/11/23  1833 07/12/23  0813 04/12/23  0826 08/23/22  0915 02/18/22  1615 01/11/22  0729 09/07/21  0825 01/19/21  1140 08/24/20  1048 01/31/20  1435 07/18/19  1609 02/04/19  1526 08/20/18  1659 05/11/18  1621   A1C  --   --   --   --  6.9* 7.4* 9.5*   < > 9.2* 7.1*   < > 6.5*   < > 5.8*  --    < > 9.4*   LDL  --   --   --   --   --   --  50  --  118* 61  --  74  --   --   --    < >  --    HDL  --   --   --   --   --   --  34*  --  40 41  --  38*  --   --   --    < >  --    TRIG  --   --   --   --   --   --  164*  --  305* 134  --  125  --   --   --    < >  --    ALT 68 202* 216*   < >  --   --   --   --   --   --   --   --   --   --   --   --   --    CR  --  0.77 0.69   < >  --   --  0.82  --   --  0.89   < > 1.02  --  1.14  --    < > 0.98   GFRESTIMATED  --  >90 >90   < >  --   --  >90  --   --  >90   < > >90  --  81  --    < > 85   GFRESTBLACK  --   --   --   --   --   --   --   --   --   --   --  >90  --  >90  --    < > >90   POTASSIUM  --  4.5 4.1   < >  --   --  4.2   < >  --  4.4   < > 4.2  --  3.9  --    < > 4.2   TSH  --   --   --   --   --   --   --   --   --   --   --   --   --   --  2.38  --  1.97    < > = values in this interval not displayed.        Reviewed and updated as needed this visit by clinical staff   Tobacco  Allergies  Meds              Reviewed and updated as needed this visit by Provider                 Past Medical History:   Diagnosis Date    Cellulitis-scrotum 08/15/2016    Diabetes mellitus, type 2 (H)     HTN (hypertension)     Hyperlipidemia LDL goal <100     NAFL (nonalcoholic fatty liver)     Pericardial effusion     2018 and 2023    SIRS (systemic inflammatory response syndrome) (H) 08/16/2016      Past Surgical History:   Procedure Laterality Date    CV  "PERICARDIOCENTESIS N/A 11/13/2023    Procedure: Pericardiocentesis;  Surgeon: Adan Coronel MD;  Location:  [facility]    HC REPAIR ING HERNIA,5+Y/O,REDUCIBL  Age 6       Review of Systems   Constitutional:  Negative for chills and fever.   HENT:  Negative for congestion, ear pain, hearing loss and sore throat.    Eyes:  Negative for pain and visual disturbance.   Respiratory:  Negative for cough and shortness of breath.    Cardiovascular:  Negative for chest pain, palpitations and peripheral edema.   Gastrointestinal:  Negative for abdominal pain, constipation, diarrhea, heartburn, hematochezia and nausea.   Genitourinary:  Negative for dysuria, frequency, genital sores, hematuria, impotence, penile discharge and urgency.   Musculoskeletal:  Negative for arthralgias, joint swelling and myalgias.   Skin:  Negative for rash.   Neurological:  Negative for dizziness, weakness, headaches and paresthesias.   Psychiatric/Behavioral:  Negative for mood changes. The patient is not nervous/anxious.      CONSTITUTIONAL: NEGATIVE for fever, chills, change in weight  INTEGUMENTARY/SKIN: NEGATIVE for worrisome rashes, moles or lesions  EYES: NEGATIVE for vision changes or irritation  ENT: NEGATIVE for ear, mouth and throat problems  RESP: NEGATIVE for significant cough or SOB  CV: NEGATIVE for chest pain, palpitations or peripheral edema  GI: NEGATIVE for nausea, abdominal pain, heartburn, or change in bowel habits   male: negative for dysuria, hematuria, decreased urinary stream, erectile dysfunction, urethral discharge  MUSCULOSKELETAL: NEGATIVE for significant arthralgias or myalgia  NEURO: NEGATIVE for weakness, dizziness or paresthesias  PSYCHIATRIC: NEGATIVE for changes in mood or affect      OBJECTIVE:   /78   Pulse 112   Temp 97.6  F (36.4  C) (Temporal)   Resp 20   Ht 1.854 m (6' 1\")   Wt (!) 170.5 kg (375 lb 12.8 oz)   SpO2 98%   BMI 49.58 kg/m      Physical Exam  GENERAL: healthy, " alert and no distress  EYES: Eyes grossly normal to inspection, PERRL and conjunctivae and sclerae normal  HENT: ear canals and TM's normal, nose and mouth without ulcers or lesions  NECK: no adenopathy, no asymmetry, masses, or scars and thyroid normal to palpation  RESP: lungs clear to auscultation - no rales, rhonchi or wheezes  CV: regular rate and rhythm, normal S1 S2, no S3 or S4, no murmur, click or rub, no peripheral edema and peripheral pulses strong  ABDOMEN: soft, nontender, no hepatosplenomegaly, no masses and bowel sounds normal  MS: no gross musculoskeletal defects noted, no edema  SKIN: no suspicious lesions or rashes  NEURO: Normal strength and tone, mentation intact and speech normal  PSYCH: mentation appears normal, affect normal/bright  Diabetic foot exam: normal DP and PT pulses, no trophic changes or ulcerative lesions, normal sensory exam, and normal monofilament exam    Diagnostic Test Results:  Labs reviewed in Epic    ASSESSMENT/PLAN:       ICD-10-CM    1. Encounter for routine adult health examination with abnormal findings  Z00.01       2. Chronic kidney disease, stage 2 (mild)  N18.2 Albumin Random Urine Quantitative with Creat Ratio     Albumin Random Urine Quantitative with Creat Ratio      3. Type 2 diabetes mellitus with microalbuminuria, without long-term current use of insulin (H)  E11.29 HEMOGLOBIN A1C    R80.9 HEMOGLOBIN A1C     empagliflozin (JARDIANCE) 25 MG TABS tablet     Semaglutide, 2 MG/DOSE, (OZEMPIC) 8 MG/3ML pen      4. Morbid obesity due to excess calories (H)  E66.01       5. Hyperlipidemia LDL goal <100  E78.5 Lipid panel reflex to direct LDL Fasting     Lipid panel reflex to direct LDL Fasting     rosuvastatin (CRESTOR) 20 MG tablet        Suspect some higher sugars due to holidays and his illness in nov/dec.  Continue all meds  Work more exercise and lower sugar/starch diet.   Recheck in 3-4 mos.   Patient has been advised of split billing requirements and indicates  understanding: Yes      COUNSELING:   Reviewed preventive health counseling, as reflected in patient instructions       Regular exercise       Healthy diet/nutrition        He reports that he has never smoked. He has never been exposed to tobacco smoke. He has never used smokeless tobacco.            LEONOR Comer [facility]

## 2024-02-09 ENCOUNTER — MYC REFILL (OUTPATIENT)
Dept: CARDIOLOGY | Facility: CLINIC | Age: 44
End: 2024-02-09
Payer: COMMERCIAL

## 2024-02-09 DIAGNOSIS — I31.39 PERICARDIAL EFFUSION: ICD-10-CM

## 2024-02-09 RX ORDER — COLCHICINE 0.6 MG/1
0.3 TABLET ORAL 2 TIMES DAILY
Qty: 30 TABLET | Refills: 3 | OUTPATIENT
Start: 2024-02-09

## 2024-02-09 NOTE — TELEPHONE ENCOUNTER
30 tabs with 3 refills sent on 1/4/2024. refill too soon     Pharmacy notified.     Elvi Manrique RN

## 2024-02-19 NOTE — PROGRESS NOTES
"  {PROVIDER CHARTING PREFERENCE:107630}    Ady Hale is a 41 year old who presents for the following health issues {ACCOMPANIED BY STATEMENT (Optional):293658}    HPI     Diabetes Follow-up      How often are you checking your blood sugar? { :291518}    What concerns do you have today about your diabetes? { :742305::\"None\"}     Do you have any of these symptoms? (Select all that apply)  { :334711}      BP Readings from Last 2 Encounters:   09/07/21 (!) 137/96   01/19/21 128/88     Hemoglobin A1C POCT (%)   Date Value   01/19/2021 6.4 (H)   08/24/2020 6.5 (H)     Hemoglobin A1C (%)   Date Value   09/07/2021 7.1 (H)     LDL Cholesterol Calculated (mg/dL)   Date Value   09/07/2021 61   08/24/2020 74   11/20/2018 78       {Reference  Diabetes Management Resources :179317}    {Reference  Diabetes Log - 7 days :248716}    Hypertension Follow-up      Do you check your blood pressure regularly outside of the clinic? { :950223}     Are you following a low salt diet? { :857305}    Are your blood pressures ever more than 140 on the top number (systolic) OR more   than 90 on the bottom number (diastolic), for example 140/90? { :601659}      How many servings of fruits and vegetables do you eat daily?  { :294023}    On average, how many sweetened beverages do you drink each day (Examples: soda, juice, sweet tea, etc.  Do NOT count diet or artificially sweetened beverages)?   { 1-11:754386}    How many days per week do you exercise enough to make your heart beat faster? { :286117}    How many minutes a day do you exercise enough to make your heart beat faster? { :603109}    How many days per week do you miss taking your medication? {0-7 :486319}    {additonal problems for provider to add (Optional):830056}    Review of Systems   {ROS COMP (Optional):558212}      Objective    There were no vitals taken for this visit.  There is no height or weight on file to calculate BMI.  Physical Exam   {Exam List " Garert Morrissey is a 83 year old male presenting for   Chief Complaint   Patient presents with    Medicare Wellness Visit    Tremors     Denies Latex allergy or sensitivity.    Medication verified, no changes  Refills needed today: No    Health Maintenance Due   Topic Date Due    Medicare Advantage- Medicare Wellness Visit  01/01/2024       Patient is up to date, no discussion needed.      Unaddressed Risk Adjusted HCC Categories and Diagnoses  HCC 96 - Specified Heart Arrhythmias  - Unaddressed Dx:Paroxysmal Atrial Fibrillation (Cmd)      Last lab results:   No results found for: \"HGBA1C\"  Cholesterol (mg/dL)   Date Value   02/17/2023 178     HDL (mg/dL)   Date Value   02/17/2023 93     Triglycerides (mg/dL)   Date Value   02/17/2023 65     LDL (mg/dL)   Date Value   02/17/2023 72     No results found for: \"URMIC\", \"UCR\", \"MALBCR\"  No results found for: \"IFOB\"    6 c.) How many servings of Fried or High Fat Foods do you have each day (1 serving = 1 Domingo, French Fries, chips, doughnut, fried chicken/fish): 2 per day     7a.) Have you had a fall in the past year?: Yes     15.) How confident are you that you can control and manage most of your health problems?: Somewhat confident         Depression Screening:  Review Flowsheet  More data exists         2/12/2024   PHQ 2/9 Score   Adult PHQ 2 Score 0   Adult PHQ 2 Interpretation No further screening needed   Little interest or pleasure in activity? Not at all   Feeling down, depressed or hopeless? Not at all        Advance Directives:  on file   (Optional):396227}    {Diagnostic Test Results (Optional):226976}    {AMBULATORY ATTESTATION (Optional):138341}

## 2024-03-24 DIAGNOSIS — E11.29 TYPE 2 DIABETES MELLITUS WITH MICROALBUMINURIA, WITHOUT LONG-TERM CURRENT USE OF INSULIN (H): ICD-10-CM

## 2024-03-24 DIAGNOSIS — R80.9 TYPE 2 DIABETES MELLITUS WITH MICROALBUMINURIA, WITHOUT LONG-TERM CURRENT USE OF INSULIN (H): ICD-10-CM

## 2024-03-25 RX ORDER — GLIPIZIDE 10 MG/1
20 TABLET, FILM COATED, EXTENDED RELEASE ORAL DAILY
Qty: 180 TABLET | Refills: 0 | Status: SHIPPED | OUTPATIENT
Start: 2024-03-25 | End: 2024-06-27

## 2024-04-03 ENCOUNTER — HOSPITAL ENCOUNTER (OUTPATIENT)
Dept: CARDIOLOGY | Facility: CLINIC | Age: 44
Discharge: HOME | End: 2024-04-03
Attending: NURSE PRACTITIONER | Admitting: NURSE PRACTITIONER
Payer: COMMERCIAL

## 2024-04-03 VITALS — SYSTOLIC BLOOD PRESSURE: 135 MMHG | DIASTOLIC BLOOD PRESSURE: 97 MMHG | HEART RATE: 102 BPM

## 2024-04-03 DIAGNOSIS — I31.39 PERICARDIAL EFFUSION: ICD-10-CM

## 2024-04-03 PROCEDURE — 75561 CARDIAC MRI FOR MORPH W/DYE: CPT

## 2024-04-03 PROCEDURE — 75561 CARDIAC MRI FOR MORPH W/DYE: CPT | Mod: 26 | Performed by: INTERNAL MEDICINE

## 2024-04-03 PROCEDURE — 255N000002 HC RX 255 OP 636: Performed by: INTERNAL MEDICINE

## 2024-04-03 PROCEDURE — A9585 GADOBUTROL INJECTION: HCPCS | Performed by: INTERNAL MEDICINE

## 2024-04-03 PROCEDURE — 250N000013 HC RX MED GY IP 250 OP 250 PS 637: Performed by: INTERNAL MEDICINE

## 2024-04-03 RX ORDER — ACYCLOVIR 200 MG/1
0-1 CAPSULE ORAL
Status: DISCONTINUED | OUTPATIENT
Start: 2024-04-03 | End: 2024-04-04 | Stop reason: HOSPADM

## 2024-04-03 RX ORDER — GADOBUTROL 604.72 MG/ML
21 INJECTION INTRAVENOUS ONCE
Status: COMPLETED | OUTPATIENT
Start: 2024-04-03 | End: 2024-04-03

## 2024-04-03 RX ORDER — METHYLPREDNISOLONE SODIUM SUCCINATE 125 MG/2ML
125 INJECTION, POWDER, LYOPHILIZED, FOR SOLUTION INTRAMUSCULAR; INTRAVENOUS
Status: DISCONTINUED | OUTPATIENT
Start: 2024-04-03 | End: 2024-04-04 | Stop reason: HOSPADM

## 2024-04-03 RX ORDER — DIPHENHYDRAMINE HYDROCHLORIDE 50 MG/ML
25-50 INJECTION INTRAMUSCULAR; INTRAVENOUS
Status: DISCONTINUED | OUTPATIENT
Start: 2024-04-03 | End: 2024-04-04 | Stop reason: HOSPADM

## 2024-04-03 RX ORDER — DIPHENHYDRAMINE HCL 25 MG
25 CAPSULE ORAL
Status: DISCONTINUED | OUTPATIENT
Start: 2024-04-03 | End: 2024-04-04 | Stop reason: HOSPADM

## 2024-04-03 RX ORDER — DIAZEPAM 5 MG
5 TABLET ORAL EVERY 30 MIN PRN
Status: DISCONTINUED | OUTPATIENT
Start: 2024-04-03 | End: 2024-04-04 | Stop reason: HOSPADM

## 2024-04-03 RX ORDER — ONDANSETRON 2 MG/ML
4 INJECTION INTRAMUSCULAR; INTRAVENOUS
Status: DISCONTINUED | OUTPATIENT
Start: 2024-04-03 | End: 2024-04-04 | Stop reason: HOSPADM

## 2024-04-03 RX ADMIN — GADOBUTROL 21 ML: 604.72 INJECTION INTRAVENOUS at 11:14

## 2024-04-03 RX ADMIN — DIAZEPAM 5 MG: 5 TABLET ORAL at 09:27

## 2024-04-03 NOTE — RESULT ENCOUNTER NOTE
LVEF 61%; no evidence MI, fibrosis, infiltrative disease; mild to mod circumferential pericardial effusion smaller than before. Follow up with Dr Clifton on 5/20/24. Pt notified via [place]

## 2024-04-24 ENCOUNTER — OFFICE VISIT (OUTPATIENT)
Dept: CARDIOLOGY | Facility: CLINIC | Age: 44
End: 2024-04-24
Attending: NURSE PRACTITIONER
Payer: COMMERCIAL

## 2024-04-24 VITALS
WEIGHT: 315 LBS | SYSTOLIC BLOOD PRESSURE: 136 MMHG | HEIGHT: 73 IN | HEART RATE: 94 BPM | BODY MASS INDEX: 41.75 KG/M2 | DIASTOLIC BLOOD PRESSURE: 86 MMHG

## 2024-04-24 DIAGNOSIS — E78.5 HYPERLIPIDEMIA LDL GOAL <100: Chronic | ICD-10-CM

## 2024-04-24 DIAGNOSIS — I10 BENIGN ESSENTIAL HYPERTENSION: ICD-10-CM

## 2024-04-24 DIAGNOSIS — I31.39 PERICARDIAL EFFUSION: ICD-10-CM

## 2024-04-24 PROCEDURE — 99213 OFFICE O/P EST LOW 20 MIN: CPT | Performed by: INTERNAL MEDICINE

## 2024-04-24 NOTE — LETTER
4/24/2024    David Deutsch PA-C  [address]  [place] [address]    RE: Geoffrey Wilson       Dear Colleague,     I had the pleasure of seeing Geoffrey Wilson in the [facility].  CARDIOLOGY CLINIC CONSULTATION    PRIMARY CARE PHYSICIAN:  David Deutsch    HISTORY OF PRESENT ILLNESS:  This is a very pleasant 43-year-old gentleman who saw us in the hospital in November 2023.  He presented with pneumonia or URI and was noted to have a large pericardial effusion on echo which was subsequently confirmed on MRI.  Unfortunately the effusion was not amenable to tap per our discussions with the interventional team.  He was hemodynamically stable and as such a initial attempt at medical management was recommended.  He was put on colchicine and ibuprofen.  He has responded quite well to that.    Additionally the patient has a history of obesity diabetes hypertension dyslipidemia.  He is on Ozempic for a BMI of 51 and he has lost about 15 pounds of weight since initiation last year.    In regards to his effusion, the patient has had serial MRIs.  MRI in January 2024 showed moderate effusion and most recent MRI in April 2024 shows mild to moderate effusion.  Effusion has been serially getting better in size.    Patient is here for cardiology follow-up today.  No syncope presyncope.  No heart failure symptoms.    PAST MEDICAL HISTORY:  Past Medical History:   Diagnosis Date    Cellulitis-scrotum 08/15/2016    Diabetes mellitus, type 2 (H)     HTN (hypertension)     Hyperlipidemia LDL goal <100     NAFL (nonalcoholic fatty liver)     Pericardial effusion     2018 and 2023    SIRS (systemic inflammatory response syndrome) (H) 08/16/2016       MEDICATIONS:  Current Outpatient Medications   Medication Sig Dispense Refill    amLODIPine-valsartan (EXFORGE)  MG tablet Take 1 tablet by mouth once daily 90 tablet 0    empagliflozin (JARDIANCE) 25 MG TABS tablet Take 1 tablet (25 mg) by mouth daily 90  tablet 0    glipiZIDE (GLUCOTROL XL) 10 MG 24 hr tablet Take 2 tablets by mouth once daily 180 tablet 0    metFORMIN (GLUCOPHAGE) 1000 MG tablet TAKE 1 TABLET BY MOUTH TWICE DAILY WITH MEALS 180 tablet 0    pantoprazole (PROTONIX) 40 MG EC tablet Take 1 tablet (40 mg) by mouth every morning (before breakfast) 30 tablet 0    rosuvastatin (CRESTOR) 20 MG tablet Take 1 tablet (20 mg) by mouth daily 90 tablet 3    Semaglutide, 2 MG/DOSE, (OZEMPIC) 8 MG/3ML pen Inject 2 mg Subcutaneous every 7 days 9 mL 0    insulin pen needle (31G X 8 MM) 31G X 8 MM miscellaneous Use 1 pen needles once a week or as directed. (Patient not taking: Reported on 4/24/2024) 100 each 3    OneTouch Delica Lancets 33G MISC 1 each 2 times daily (Patient not taking: Reported on 4/24/2024) 100 each 3    ONETOUCH VERIO IQ test strip Use to test blood sugar 1-2 times daily or as directed. (Patient not taking: Reported on 4/24/2024) 50 strip 3     No current facility-administered medications for this visit.       SOCIAL HISTORY:  I have reviewed this patient's social history and updated it with pertinent information if needed. Geoffrey Wilson  reports that he has never smoked. He has never been exposed to tobacco smoke. He has never used smokeless tobacco. He reports current alcohol use. He reports that he does not use drugs.    PHYSICAL EXAM:  Pulse:  [94] 94  BP: (136)/(86) 136/86  382 lbs 4.8 oz    Constitutional: alert, no distress  Respiratory: Good bilateral air entry  Cardiovascular: Regular heart sounds no murmur rubs or gallops no edema  GI: nondistended obese  Neuropsychiatric: appropriate affact    ASSESSMENT: Pertinent issues addressed/ reviewed during this cardiology visit  Pericardial effusion likely related to pneumonia and viral illness  Obesity diabetes hypertension dyslipidemia    RECOMMENDATIONS:  The patient is asymptomatic from a cardiac standpoint.  Effusion is mild to moderate on most recent echocardiogram.  At this time I  recommend stopping colchicine and ibuprofen therapy.  Check labs.  Check CRP levels.  Echocardiogram in 3 months.  If stable, then yearly follow-up with cardiology to ensure no recollection of fluid in the next year or so.  Watch for symptoms particularly shortness of breath edema chest pain syncope.  Healthy diet weight loss gradual increase in exercise recommended.  Follow-up with PCP for overall health care and risk factor management.    See me in clinic in 1 year.  Echo in 3 months.  Labs this week.    It was a pleasure seeing this patient in clinic today. Please do not hesitate to contact me with any future questions.     BLANCA Hart, [facility]  Cardiology - [facility]  April 24, 2024    Review of the result(s) of each unique test - Last CBC BMP lipids echo MRI     The level of medical decision making during this visit was of moderate complexity.    This note was completed in part using dictation via the Dragon voice recognition software. Some word and grammatical errors may occur and must be interpreted in the appropriate clinical context.  If there are any questions pertaining to this issue, please contact me for further clarification.  Thank you for allowing me to participate in the care of your patient.      Sincerely,     Yadi Clifton MD     [facility]  cc:   Alfreda Agrawal, ADONIS CNP  [address] [place] [address]

## 2024-04-24 NOTE — PROGRESS NOTES
CARDIOLOGY CLINIC CONSULTATION    PRIMARY CARE PHYSICIAN:  David Deutsch    HISTORY OF PRESENT ILLNESS:  This is a very pleasant 43-year-old gentleman who saw us in the hospital in November 2023.  He presented with pneumonia or URI and was noted to have a large pericardial effusion on echo which was subsequently confirmed on MRI.  Unfortunately the effusion was not amenable to tap per our discussions with the interventional team.  He was hemodynamically stable and as such a initial attempt at medical management was recommended.  He was put on colchicine and ibuprofen.  He has responded quite well to that.    Additionally the patient has a history of obesity diabetes hypertension dyslipidemia.  He is on Ozempic for a BMI of 51 and he has lost about 15 pounds of weight since initiation last year.    In regards to his effusion, the patient has had serial MRIs.  MRI in January 2024 showed moderate effusion and most recent MRI in April 2024 shows mild to moderate effusion.  Effusion has been serially getting better in size.    Patient is here for cardiology follow-up today.  No syncope presyncope.  No heart failure symptoms.    PAST MEDICAL HISTORY:  Past Medical History:   Diagnosis Date    Cellulitis-scrotum 08/15/2016    Diabetes mellitus, type 2 (H)     HTN (hypertension)     Hyperlipidemia LDL goal <100     NAFL (nonalcoholic fatty liver)     Pericardial effusion     2018 and 2023    SIRS (systemic inflammatory response syndrome) (H) 08/16/2016       MEDICATIONS:  Current Outpatient Medications   Medication Sig Dispense Refill    amLODIPine-valsartan (EXFORGE)  MG tablet Take 1 tablet by mouth once daily 90 tablet 0    empagliflozin (JARDIANCE) 25 MG TABS tablet Take 1 tablet (25 mg) by mouth daily 90 tablet 0    glipiZIDE (GLUCOTROL XL) 10 MG 24 hr tablet Take 2 tablets by mouth once daily 180 tablet 0    metFORMIN (GLUCOPHAGE) 1000 MG tablet TAKE 1 TABLET BY MOUTH TWICE DAILY WITH MEALS 180 tablet 0     pantoprazole (PROTONIX) 40 MG EC tablet Take 1 tablet (40 mg) by mouth every morning (before breakfast) 30 tablet 0    rosuvastatin (CRESTOR) 20 MG tablet Take 1 tablet (20 mg) by mouth daily 90 tablet 3    Semaglutide, 2 MG/DOSE, (OZEMPIC) 8 MG/3ML pen Inject 2 mg Subcutaneous every 7 days 9 mL 0    insulin pen needle (31G X 8 MM) 31G X 8 MM miscellaneous Use 1 pen needles once a week or as directed. (Patient not taking: Reported on 4/24/2024) 100 each 3    OneTouch Delica Lancets 33G MISC 1 each 2 times daily (Patient not taking: Reported on 4/24/2024) 100 each 3    ONETOUCH VERIO IQ test strip Use to test blood sugar 1-2 times daily or as directed. (Patient not taking: Reported on 4/24/2024) 50 strip 3     No current facility-administered medications for this visit.       SOCIAL HISTORY:  I have reviewed this patient's social history and updated it with pertinent information if needed. Geoffrey Wilson  reports that he has never smoked. He has never been exposed to tobacco smoke. He has never used smokeless tobacco. He reports current alcohol use. He reports that he does not use drugs.    PHYSICAL EXAM:  Pulse:  [94] 94  BP: (136)/(86) 136/86  382 lbs 4.8 oz    Constitutional: alert, no distress  Respiratory: Good bilateral air entry  Cardiovascular: Regular heart sounds no murmur rubs or gallops no edema  GI: nondistended obese  Neuropsychiatric: appropriate affact    ASSESSMENT: Pertinent issues addressed/ reviewed during this cardiology visit  Pericardial effusion likely related to pneumonia and viral illness  Obesity diabetes hypertension dyslipidemia    RECOMMENDATIONS:  The patient is asymptomatic from a cardiac standpoint.  Effusion is mild to moderate on most recent echocardiogram.  At this time I recommend stopping colchicine and ibuprofen therapy.  Check labs.  Check CRP levels.  Echocardiogram in 3 months.  If stable, then yearly follow-up with cardiology to ensure no recollection of fluid in the next  year or so.  Watch for symptoms particularly shortness of breath edema chest pain syncope.  Healthy diet weight loss gradual increase in exercise recommended.  Follow-up with PCP for overall health care and risk factor management.    See me in clinic in 1 year.  Echo in 3 months.  Labs this week.    It was a pleasure seeing this patient in clinic today. Please do not hesitate to contact me with any future questions.     BLANCA Hart, [facility]  Cardiology - [facility]  April 24, 2024    Review of the result(s) of each unique test - Last CBC BMP lipids echo MRI     The level of medical decision making during this visit was of moderate complexity.    This note was completed in part using dictation via the Dragon voice recognition software. Some word and grammatical errors may occur and must be interpreted in the appropriate clinical context.  If there are any questions pertaining to this issue, please contact me for further clarification.

## 2024-04-26 ENCOUNTER — LAB (OUTPATIENT)
Dept: LAB | Facility: OTHER | Age: 44
End: 2024-04-26
Payer: COMMERCIAL

## 2024-04-26 DIAGNOSIS — E78.5 HYPERLIPIDEMIA LDL GOAL <100: ICD-10-CM

## 2024-04-26 DIAGNOSIS — I31.39 PERICARDIAL EFFUSION: ICD-10-CM

## 2024-04-26 DIAGNOSIS — I10 BENIGN ESSENTIAL HYPERTENSION: ICD-10-CM

## 2024-04-26 LAB
ALBUMIN SERPL BCG-MCNC: 4.6 G/DL (ref 3.5–5.2)
ALP SERPL-CCNC: 66 U/L (ref 40–150)
ALT SERPL W P-5'-P-CCNC: 35 U/L (ref 0–70)
ANION GAP SERPL CALCULATED.3IONS-SCNC: 11 MMOL/L (ref 7–15)
AST SERPL W P-5'-P-CCNC: 23 U/L (ref 0–45)
BASOPHILS # BLD AUTO: 0.1 10E3/UL (ref 0–0.2)
BASOPHILS NFR BLD AUTO: 1 %
BILIRUB SERPL-MCNC: 0.2 MG/DL
BUN SERPL-MCNC: 16.5 MG/DL (ref 6–20)
CALCIUM SERPL-MCNC: 10.1 MG/DL (ref 8.6–10)
CHLORIDE SERPL-SCNC: 104 MMOL/L (ref 98–107)
CREAT SERPL-MCNC: 1.03 MG/DL (ref 0.67–1.17)
CRP SERPL-MCNC: <3 MG/L
DEPRECATED HCO3 PLAS-SCNC: 26 MMOL/L (ref 22–29)
EGFRCR SERPLBLD CKD-EPI 2021: >90 ML/MIN/1.73M2
EOSINOPHIL # BLD AUTO: 0.4 10E3/UL (ref 0–0.7)
EOSINOPHIL NFR BLD AUTO: 4 %
ERYTHROCYTE [DISTWIDTH] IN BLOOD BY AUTOMATED COUNT: 13.5 % (ref 10–15)
GLUCOSE SERPL-MCNC: 205 MG/DL (ref 70–99)
HCT VFR BLD AUTO: 46.5 % (ref 40–53)
HGB BLD-MCNC: 16 G/DL (ref 13.3–17.7)
IMM GRANULOCYTES # BLD: 0.1 10E3/UL
IMM GRANULOCYTES NFR BLD: 1 %
LYMPHOCYTES # BLD AUTO: 3.4 10E3/UL (ref 0.8–5.3)
LYMPHOCYTES NFR BLD AUTO: 40 %
MCH RBC QN AUTO: 29.9 PG (ref 26.5–33)
MCHC RBC AUTO-ENTMCNC: 34.4 G/DL (ref 31.5–36.5)
MCV RBC AUTO: 87 FL (ref 78–100)
MONOCYTES # BLD AUTO: 0.9 10E3/UL (ref 0–1.3)
MONOCYTES NFR BLD AUTO: 10 %
NEUTROPHILS # BLD AUTO: 3.8 10E3/UL (ref 1.6–8.3)
NEUTROPHILS NFR BLD AUTO: 44 %
PLATELET # BLD AUTO: 272 10E3/UL (ref 150–450)
POTASSIUM SERPL-SCNC: 4.2 MMOL/L (ref 3.4–5.3)
PROT SERPL-MCNC: 7.5 G/DL (ref 6.4–8.3)
RBC # BLD AUTO: 5.35 10E6/UL (ref 4.4–5.9)
SODIUM SERPL-SCNC: 141 MMOL/L (ref 135–145)
WBC # BLD AUTO: 8.4 10E3/UL (ref 4–11)

## 2024-04-26 PROCEDURE — 36415 COLL VENOUS BLD VENIPUNCTURE: CPT

## 2024-04-26 PROCEDURE — 85025 COMPLETE CBC W/AUTO DIFF WBC: CPT

## 2024-04-26 PROCEDURE — 80053 COMPREHEN METABOLIC PANEL: CPT

## 2024-04-26 PROCEDURE — 86140 C-REACTIVE PROTEIN: CPT

## 2024-04-29 DIAGNOSIS — E11.29 TYPE 2 DIABETES MELLITUS WITH MICROALBUMINURIA, WITHOUT LONG-TERM CURRENT USE OF INSULIN (H): ICD-10-CM

## 2024-04-29 DIAGNOSIS — R80.9 TYPE 2 DIABETES MELLITUS WITH MICROALBUMINURIA, WITHOUT LONG-TERM CURRENT USE OF INSULIN (H): ICD-10-CM

## 2024-04-29 RX ORDER — SEMAGLUTIDE 2.68 MG/ML
2 INJECTION, SOLUTION SUBCUTANEOUS
Qty: 9 ML | Refills: 0 | Status: SHIPPED | OUTPATIENT
Start: 2024-04-29 | End: 2024-08-15

## 2024-06-27 DIAGNOSIS — I10 BENIGN ESSENTIAL HYPERTENSION: ICD-10-CM

## 2024-06-27 DIAGNOSIS — E11.29 TYPE 2 DIABETES MELLITUS WITH MICROALBUMINURIA, WITHOUT LONG-TERM CURRENT USE OF INSULIN (H): ICD-10-CM

## 2024-06-27 DIAGNOSIS — R80.9 TYPE 2 DIABETES MELLITUS WITH MICROALBUMINURIA, WITHOUT LONG-TERM CURRENT USE OF INSULIN (H): ICD-10-CM

## 2024-06-27 RX ORDER — AMLODIPINE AND VALSARTAN 10; 160 MG/1; MG/1
1 TABLET ORAL DAILY
Qty: 90 TABLET | Refills: 0 | Status: SHIPPED | OUTPATIENT
Start: 2024-06-27 | End: 2024-08-15

## 2024-06-27 RX ORDER — GLIPIZIDE 10 MG/1
20 TABLET, FILM COATED, EXTENDED RELEASE ORAL DAILY
Qty: 180 TABLET | Refills: 0 | Status: SHIPPED | OUTPATIENT
Start: 2024-06-27 | End: 2024-08-15

## 2024-08-03 ENCOUNTER — HEALTH MAINTENANCE LETTER (OUTPATIENT)
Age: 44
End: 2024-08-03

## 2024-08-08 ENCOUNTER — ANCILLARY PROCEDURE (OUTPATIENT)
Dept: CARDIOLOGY | Facility: CLINIC | Age: 44
End: 2024-08-08
Attending: INTERNAL MEDICINE
Payer: COMMERCIAL

## 2024-08-08 ENCOUNTER — MYC MEDICAL ADVICE (OUTPATIENT)
Dept: CARDIOLOGY | Facility: CLINIC | Age: 44
End: 2024-08-08

## 2024-08-08 DIAGNOSIS — I31.39 PERICARDIAL EFFUSION: ICD-10-CM

## 2024-08-08 DIAGNOSIS — I10 BENIGN ESSENTIAL HYPERTENSION: ICD-10-CM

## 2024-08-08 DIAGNOSIS — E78.5 HYPERLIPIDEMIA LDL GOAL <100: Chronic | ICD-10-CM

## 2024-08-08 DIAGNOSIS — I31.39 PERICARDIAL EFFUSION: Primary | ICD-10-CM

## 2024-08-08 LAB — LVEF ECHO: NORMAL

## 2024-08-08 PROCEDURE — 93306 TTE W/DOPPLER COMPLETE: CPT | Performed by: INTERNAL MEDICINE

## 2024-08-08 NOTE — TELEPHONE ENCOUNTER
Routed pt's Echo 8/8/24 for Dr. Clifton to review on 8/9/24 when he is back in clinic. Iin the mean time I told pt if he develops concerning symptoms of heart failure or pre-syncope or syncope to present to ER urgently right away. At this time, the reading cardiologist apparently spoke with pt and he is asymptomatic so he was told it was ok to go home and wait for ordering provider's review. Rosalina Bradley RN on 8/8/2024 at 12:50 PM        Geoffrey Abbott [facility] Heart Team 7 (supporting Yadi Clifton MD)36 minutes ago (12:12 PM)     I had my follow up echo this morning in [place], and they were concerned about the results. I was wondering if Dr. Clifton or someone on his team has had a chance to review the results at this point. They had initially told me I should go to the ER today, but after consulting the cardiologist on site, he thought I could wait until someone on my care team looked at the results and had a chance to compare them to prior tests. Please let me know as soon as you have any guidance or information for me regarding these results.      Thanks.

## 2024-08-09 NOTE — TELEPHONE ENCOUNTER
Thank you for the alert.  I personally reviewed his echocardiogram.  I do not think the effusion is significantly worse.  It is predominantly anterior with an extremely small apical access which would be further challenging given his body habitus.  This may not be easily amenable to tap.  If he is asymptomatic with normal blood pressures, recommend ongoing conservative medical management with repeat follow-up limited echocardiogram in 6 to 8 weeks.  Image quality however is quite poor and we may need to repeat an MRI standpoint.  Please have him follow-up with CHARLINE.  Again agree with your recommendations of any symptoms he needs to come into the hospital.

## 2024-08-15 ENCOUNTER — OFFICE VISIT (OUTPATIENT)
Dept: FAMILY MEDICINE | Facility: CLINIC | Age: 44
End: 2024-08-15
Payer: COMMERCIAL

## 2024-08-15 VITALS
TEMPERATURE: 98.3 F | HEIGHT: 73 IN | SYSTOLIC BLOOD PRESSURE: 136 MMHG | WEIGHT: 315 LBS | BODY MASS INDEX: 41.75 KG/M2 | OXYGEN SATURATION: 96 % | DIASTOLIC BLOOD PRESSURE: 82 MMHG | RESPIRATION RATE: 24 BRPM | HEART RATE: 105 BPM

## 2024-08-15 DIAGNOSIS — I10 BENIGN ESSENTIAL HYPERTENSION: ICD-10-CM

## 2024-08-15 DIAGNOSIS — R80.9 TYPE 2 DIABETES MELLITUS WITH MICROALBUMINURIA, WITHOUT LONG-TERM CURRENT USE OF INSULIN (H): Primary | ICD-10-CM

## 2024-08-15 DIAGNOSIS — E78.5 HYPERLIPIDEMIA LDL GOAL <100: ICD-10-CM

## 2024-08-15 DIAGNOSIS — E11.29 TYPE 2 DIABETES MELLITUS WITH MICROALBUMINURIA, WITHOUT LONG-TERM CURRENT USE OF INSULIN (H): Primary | ICD-10-CM

## 2024-08-15 LAB — HBA1C MFR BLD: 8 % (ref 0–5.6)

## 2024-08-15 PROCEDURE — 83036 HEMOGLOBIN GLYCOSYLATED A1C: CPT | Performed by: PHYSICIAN ASSISTANT

## 2024-08-15 PROCEDURE — 90677 PCV20 VACCINE IM: CPT | Performed by: PHYSICIAN ASSISTANT

## 2024-08-15 PROCEDURE — 90471 IMMUNIZATION ADMIN: CPT | Performed by: PHYSICIAN ASSISTANT

## 2024-08-15 PROCEDURE — 99214 OFFICE O/P EST MOD 30 MIN: CPT | Mod: 25 | Performed by: PHYSICIAN ASSISTANT

## 2024-08-15 PROCEDURE — 36415 COLL VENOUS BLD VENIPUNCTURE: CPT | Performed by: PHYSICIAN ASSISTANT

## 2024-08-15 RX ORDER — ROSUVASTATIN CALCIUM 20 MG/1
20 TABLET, COATED ORAL DAILY
Qty: 90 TABLET | Refills: 3 | Status: SHIPPED | OUTPATIENT
Start: 2024-08-15

## 2024-08-15 RX ORDER — GLIPIZIDE 10 MG/1
20 TABLET, FILM COATED, EXTENDED RELEASE ORAL DAILY
Qty: 180 TABLET | Refills: 0 | Status: SHIPPED | OUTPATIENT
Start: 2024-08-15

## 2024-08-15 RX ORDER — AMLODIPINE AND VALSARTAN 10; 160 MG/1; MG/1
1 TABLET ORAL DAILY
Qty: 90 TABLET | Refills: 1 | Status: SHIPPED | OUTPATIENT
Start: 2024-08-15

## 2024-08-15 RX ORDER — SEMAGLUTIDE 2.68 MG/ML
2 INJECTION, SOLUTION SUBCUTANEOUS
Qty: 9 ML | Refills: 0 | Status: SHIPPED | OUTPATIENT
Start: 2024-08-15

## 2024-08-15 NOTE — PROGRESS NOTES
"    Ady Hale is a 44 year old, presenting for the following health issues:  Diabetes (recheck)        8/15/2024     8:43 AM   Additional Questions   Roomed by Ange Cunningham CMA   Accompanied by None         8/15/2024     8:43 AM   Patient Reported Additional Medications   Patient reports taking the following new medications none     History of Present Illness       Diabetes:   He presents for follow up of diabetes.    He is not checking blood glucose.         He has no concerns regarding his diabetes at this time.   He is not experiencing numbness or burning in feet, excessive thirst, blurry vision, weight changes or redness, sores or blisters on feet.           Hyperlipidemia:  He presents for follow up of hyperlipidemia.   He is taking medication to lower cholesterol. He is not having myalgia or other side effects to statin medications.    Hypertension: He presents for follow up of hypertension.  He does not check blood pressure  regularly outside of the clinic. Outside blood pressures have been over 140/90. He does not follow a low salt diet.     He eats 2-3 servings of fruits and vegetables daily.He consumes 1 sweetened beverage(s) daily.He exercises with enough effort to increase his heart rate 10 to 19 minutes per day.  He exercises with enough effort to increase his heart rate 4 days per week.   He is taking medications regularly.     No chest pain/sob/palpitations/dizziness/ha's  No edema  No neuropathy symptoms.  Vision stable.       Review of Systems  Constitutional, HEENT, cardiovascular, pulmonary, GI, , musculoskeletal, neuro, skin, endocrine and psych systems are negative, except as otherwise noted.      Objective    /82   Pulse 105   Temp 98.3  F (36.8  C) (Oral)   Resp 24   Ht 1.854 m (6' 1\")   Wt (!) 175.9 kg (387 lb 12.8 oz)   SpO2 96%   BMI 51.16 kg/m    Body mass index is 51.16 kg/m .  Physical Exam   Eye exam - right eye normal lid, conjunctiva, cornea, pupil and " fundus, left eye normal lid, conjunctiva, cornea, pupil and fundus.  Thyroid not palpable, not enlarged, no nodules detected.  CHEST:chest clear to IPPA, no tachypnea, retractions or cyanosis, and S1, S2 normal, no murmur, no gallop, rate regular.  Foot exam - both sides normal; no swelling, tenderness or skin or vascular lesions. Color and temperature is normal. Sensation is intact. Peripheral pulses are palpable. Toenails are normal.      Geoffrey was seen today for diabetes.    Diagnoses and all orders for this visit:    Type 2 diabetes mellitus with microalbuminuria, without long-term current use of insulin (H)  -     HEMOGLOBIN A1C; Future  -     HEMOGLOBIN A1C  -     empagliflozin (JARDIANCE) 25 MG TABS tablet; Take 1 tablet (25 mg) by mouth daily  -     glipiZIDE (GLUCOTROL XL) 10 MG 24 hr tablet; Take 2 tablets (20 mg) by mouth daily  -     metFORMIN (GLUCOPHAGE) 1000 MG tablet; TAKE 1 TABLET BY MOUTH TWICE DAILY WITH MEALS  -     Semaglutide, 2 MG/DOSE, (OZEMPIC, 2 MG/DOSE,) 8 MG/3ML pen; Inject 2 mg subcutaneously every 7 days  -     Adult Diabetes Education  Referral; Future    Benign essential hypertension  -     amLODIPine-valsartan (EXFORGE)  MG tablet; Take 1 tablet by mouth daily    Hyperlipidemia LDL goal <100  -     rosuvastatin (CRESTOR) 20 MG tablet; Take 1 tablet (20 mg) by mouth daily      Patient would like to really work on life style changes.  More exercise  Recheck in 3-4 mos     Signed Electronically by: David Deutsch PA-C

## 2024-09-27 ENCOUNTER — HOSPITAL ENCOUNTER (OUTPATIENT)
Dept: CARDIOLOGY | Facility: CLINIC | Age: 44
Discharge: HOME | End: 2024-09-27
Attending: INTERNAL MEDICINE | Admitting: INTERNAL MEDICINE
Payer: COMMERCIAL

## 2024-09-27 DIAGNOSIS — I31.39 PERICARDIAL EFFUSION: ICD-10-CM

## 2024-09-27 LAB — LVEF ECHO: NORMAL

## 2024-09-27 PROCEDURE — 93325 DOPPLER ECHO COLOR FLOW MAPG: CPT | Mod: 26 | Performed by: INTERNAL MEDICINE

## 2024-09-27 PROCEDURE — 93325 DOPPLER ECHO COLOR FLOW MAPG: CPT

## 2024-09-27 PROCEDURE — 93308 TTE F-UP OR LMTD: CPT | Mod: 26 | Performed by: INTERNAL MEDICINE

## 2024-09-27 PROCEDURE — 93321 DOPPLER ECHO F-UP/LMTD STD: CPT | Mod: 26 | Performed by: INTERNAL MEDICINE

## 2024-09-27 NOTE — RESULT ENCOUNTER NOTE
EF 60-65%; unable to assess for WMAs; small pericardial effusion but no evidence of tamponade; no significant valve disease; no change from previous per reader. Follow up with Alfreda Agrawal NP on 10/11/24. Pt notified via [employer].

## 2024-10-10 PROBLEM — I31.39 PERICARDIAL EFFUSION: Status: ACTIVE | Noted: 2024-10-10

## 2024-10-10 NOTE — PROGRESS NOTES
Cardiology Clinic Progress Note  Geoffrey Wilson MRN# [id number]   YOB: 1980 Age: 44 year old      Primary Cardiologist:   Dr. Clifton for 6-month follow-up          History of Presenting Illness:      In the fall 2023 he had pneumonia and noted to have a large pericardial effusion on echo which was confirmed on MRI.  It was not amenable to tapping when reviewed with interventional team.  Medical management was recommended and started on colchicine and ibuprofen.  He had serial MRIs in January 2024 showed moderate effusion.  MRI April 2024 showed mild to moderate effusion.    Patient was seen by Dr. Clifton in April 2024 and he recommended discontinuing colchicine and ibuprofen, CRP was WNL, and recommended follow-up echo in 3 months.    Most recent lipid profile, BMP, ALT reviewed today.  A1c elevated at 8.0. Echo August 2024 showing normal biventricular size and function, no significant valvular disease, and small to moderate pericardial effusion most prominent anterior to the right ventricle up to 1.9 cm.  There was evidence of mild early diastolic collapse of the RV but no other signs of tamponade.  Per the reader, compared to prior echo there was slight worsening of effusion and diastolic collapse of RV was new.  Patient was reportedly still asymptomatic.  Dr. Clifton reviewed the echocardiogram but did not feel there was any significant worsening of the effusion and again noted that this would be challenging to tap.  Since he was asymptomatic with normal BPs he recommended conservative management and repeating limited echo in 6 to 8 weeks.  If image quality was still poor may need to consider repeat MRI instead.  Results reviewed today.    Most recent lipid profile, BMP, and ALT reviewed today. Follow-up echo 9/27/2024 showing LVEF 60 to 65%, small pericardial effusion and no chamber collapse was noted.  Results reviewed today.      BP? Controlled   Tachycardia? Mild 104, has had since 2015. TSH in  2019 was WNL.   SOB, edema, chest pain, syncope? None    Wt? Stable   No heart failure symptoms       Patient reports no chest pain, shortness of breath, PND, orthopnea, presyncope, syncope, edema, heart racing, or palpitations.                    Assessment and Plan:     Plan  Patient Instructions   Medication Changes:  None     Recommendations:  Check blood pressure at least 1 hour after medications. Call the clinic if your blood pressure is consistently greater than 130/80.   Call if you develop any shortness of breath, leg swelling, chest pain, or passing out.     Follow-up:  Cardiology follow up at [facility]: Dr. Clifton in 6 months with echo prior.     Cardiology Scheduling~[phone]  Cardiology Clinic RN~[phone] (Marietta RN, Mirella RN; Elvi RN)          Problem List as of 10/11/2024 Reviewed: 11/29/2023  3:26 PM by Kylie Smith MD            Noted       Active Problems    1. Tachycardia 10/11/2024     Overview Signed 10/11/2024  9:33 AM by Alfreda Agrawal APRN CNP      Intermittent since 2015  TSH 2019 was WNL  Asymptomatic          Last Assessment & Plan 10/11/2024 Office Visit Written 10/11/2024  9:33 AM by Alfreda Agrawal APRN CNP      Asymptomatic  Continue to monitor         2. Benign essential hypertension 5/11/2018     Last Assessment & Plan 10/11/2024 Office Visit Written 10/10/2024  4:29 PM by Alfreda Agrawal APRN CNP      Controlled  Continue current medications          Relevant Orders     Follow-Up with Cardiology    3. Hyperlipidemia LDL goal <100 5/11/2018     Last Assessment & Plan 10/11/2024 Office Visit Written 10/10/2024  4:29 PM by Alfreda Agrawal APRN CNP      LDL controlled  Continue statin          Relevant Orders     Follow-Up with Cardiology    4. Pericardial effusion - Primary 10/10/2024     Overview Addendum 10/10/2024  4:39 PM by Alfreda Agrawal APRN CNP      Large pericardial effusion 11/2023  Etiology: Likely from  infection as he had concurrent pneumonia  Follow-up echo 12/2023 showing small effusion but reader reported this as similar- will repeat CMR  CMR 1/2024 showing moderate pericardial effusion   MRI 4/2024 mild to moderate effusion  Echo 8/2024 showing small to moderate effusion with diastolic collapse of RV-conservative management  Echo 9/2024 small pericardial effusion no diastolic collapse of RV          Last Assessment & Plan 10/11/2024 Office Visit Written 10/10/2024  4:29 PM by Alfreda Agrawal APRN CNP      Asymptomatic  Was treated with colchicine  Follow with echo  If worsening, follow-up with CV surgery          Relevant Orders     Follow-Up with Cardiology     Echocardiogram Complete             Respiratory:  clear to auscultation; normal symmetry        Cardiac: regular rate and rhythm, distant heart sounds   GI:  abdomen nondistended     Extremities and Muscular Skeletal:   no edema                Thank you for allowing me to participate in this delightful patient's care.   This note was completed in part using Dragon voice recognition software. Although reviewed after completion, some word and grammatical errors may occur.    ADONIS Mcqueen CNP

## 2024-10-10 NOTE — ASSESSMENT & PLAN NOTE
Asymptomatic  Was treated with colchicine  Follow with echo  If worsening, follow-up with CV surgery

## 2024-10-10 NOTE — PATIENT INSTRUCTIONS
Medication Changes:  None     Recommendations:  Check blood pressure at least 1 hour after medications. Call the clinic if your blood pressure is consistently greater than 130/80.   Call if you develop any shortness of breath, leg swelling, chest pain, or passing out.     Follow-up:  Cardiology follow up at [facility]: Dr. Clifton in 6 months with echo prior.     Cardiology Scheduling~[phone]  Cardiology Clinic RN~[phone] (Marietta RN, Mirella RN; Elvi RN)

## 2024-10-11 ENCOUNTER — OFFICE VISIT (OUTPATIENT)
Dept: CARDIOLOGY | Facility: CLINIC | Age: 44
End: 2024-10-11
Attending: INTERNAL MEDICINE
Payer: COMMERCIAL

## 2024-10-11 VITALS
RESPIRATION RATE: 10 BRPM | BODY MASS INDEX: 41.75 KG/M2 | HEART RATE: 104 BPM | DIASTOLIC BLOOD PRESSURE: 84 MMHG | SYSTOLIC BLOOD PRESSURE: 134 MMHG | HEIGHT: 73 IN | WEIGHT: 315 LBS | OXYGEN SATURATION: 94 %

## 2024-10-11 DIAGNOSIS — I31.39 PERICARDIAL EFFUSION: Primary | ICD-10-CM

## 2024-10-11 DIAGNOSIS — R00.0 TACHYCARDIA: ICD-10-CM

## 2024-10-11 DIAGNOSIS — I10 BENIGN ESSENTIAL HYPERTENSION: ICD-10-CM

## 2024-10-11 DIAGNOSIS — E78.5 HYPERLIPIDEMIA LDL GOAL <100: ICD-10-CM

## 2024-10-11 PROCEDURE — 99214 OFFICE O/P EST MOD 30 MIN: CPT | Performed by: NURSE PRACTITIONER

## 2024-10-11 ASSESSMENT — PAIN SCALES - GENERAL: PAINLEVEL: NO PAIN (0)

## 2024-10-11 NOTE — LETTER
10/11/2024    David Deutsch PA-C  [address] [place] [address]    RE: Geoffrey Wilson       Dear Colleague,     I had the pleasure of seeing Geoffrey Wilson in the [facility].  Cardiology Clinic Progress Note  Geoffrey Wilson MRN# [id number]   YOB: 1980 Age: 44 year old      Primary Cardiologist:   Dr. Clifton for 6-month follow-up          History of Presenting Illness:      In the fall 2023 he had pneumonia and noted to have a large pericardial effusion on echo which was confirmed on MRI.  It was not amenable to tapping when reviewed with interventional team.  Medical management was recommended and started on colchicine and ibuprofen.  He had serial MRIs in January 2024 showed moderate effusion.  MRI April 2024 showed mild to moderate effusion.    Patient was seen by Dr. Clifton in April 2024 and he recommended discontinuing colchicine and ibuprofen, CRP was WNL, and recommended follow-up echo in 3 months.    Most recent lipid profile, BMP, ALT reviewed today.  A1c elevated at 8.0. Echo August 2024 showing normal biventricular size and function, no significant valvular disease, and small to moderate pericardial effusion most prominent anterior to the right ventricle up to 1.9 cm.  There was evidence of mild early diastolic collapse of the RV but no other signs of tamponade.  Per the reader, compared to prior echo there was slight worsening of effusion and diastolic collapse of RV was new.  Patient was reportedly still asymptomatic.  Dr. Clifton reviewed the echocardiogram but did not feel there was any significant worsening of the effusion and again noted that this would be challenging to tap.  Since he was asymptomatic with normal BPs he recommended conservative management and repeating limited echo in 6 to 8 weeks.  If image quality was still poor may need to consider repeat MRI instead.  Results reviewed today.    Most recent lipid profile, BMP, and ALT reviewed today.  Follow-up echo 9/27/2024 showing LVEF 60 to 65%, small pericardial effusion and no chamber collapse was noted.  Results reviewed today.      BP? Controlled   Tachycardia? Mild 104, has had since 2015. TSH in 2019 was WNL.   SOB, edema, chest pain, syncope? None    Wt? Stable   No heart failure symptoms       Patient reports no chest pain, shortness of breath, PND, orthopnea, presyncope, syncope, edema, heart racing, or palpitations.                    Assessment and Plan:     Plan  Patient Instructions   Medication Changes:  None     Recommendations:  Check blood pressure at least 1 hour after medications. Call the clinic if your blood pressure is consistently greater than 130/80.   Call if you develop any shortness of breath, leg swelling, chest pain, or passing out.     Follow-up:  Cardiology follow up at [facility]: Dr. Clifton in 6 months with echo prior.     Cardiology Scheduling~[phone]  Cardiology Clinic RN~[phone] (Marietta RN, Mirella RN; Elvi RN)          Problem List as of 10/11/2024 Reviewed: 11/29/2023  3:26 PM by Kylie Smith MD            Noted       Active Problems    1. Tachycardia 10/11/2024     Overview Signed 10/11/2024  9:33 AM by Alfreda Agrawal APRN CNP      Intermittent since 2015  TSH 2019 was WNL  Asymptomatic          Last Assessment & Plan 10/11/2024 Office Visit Written 10/11/2024  9:33 AM by Alfreda Agrawal APRN CNP      Asymptomatic  Continue to monitor         2. Benign essential hypertension 5/11/2018     Last Assessment & Plan 10/11/2024 Office Visit Written 10/10/2024  4:29 PM by Alfreda Agrawal APRN CNP      Controlled  Continue current medications          Relevant Orders     Follow-Up with Cardiology    3. Hyperlipidemia LDL goal <100 5/11/2018     Last Assessment & Plan 10/11/2024 Office Visit Written 10/10/2024  4:29 PM by Alfreda Agrawal APRN CNP      LDL controlled  Continue statin          Relevant Orders      Follow-Up with Cardiology    4. Pericardial effusion - Primary 10/10/2024     Overview Addendum 10/10/2024  4:39 PM by Alfreda Agrawal APRN CNP      Large pericardial effusion 11/2023  Etiology: Likely from infection as he had concurrent pneumonia  Follow-up echo 12/2023 showing small effusion but reader reported this as similar- will repeat CMR  CMR 1/2024 showing moderate pericardial effusion   MRI 4/2024 mild to moderate effusion  Echo 8/2024 showing small to moderate effusion with diastolic collapse of RV-conservative management  Echo 9/2024 small pericardial effusion no diastolic collapse of RV          Last Assessment & Plan 10/11/2024 Office Visit Written 10/10/2024  4:29 PM by Alfreda Agrawal APRN CNP      Asymptomatic  Was treated with colchicine  Follow with echo  If worsening, follow-up with CV surgery          Relevant Orders     Follow-Up with Cardiology     Echocardiogram Complete             Respiratory:  clear to auscultation; normal symmetry        Cardiac: regular rate and rhythm, distant heart sounds   GI:  abdomen nondistended     Extremities and Muscular Skeletal:   no edema                Thank you for allowing me to participate in this delightful patient's care.   This note was completed in part using Dragon voice recognition software. Although reviewed after completion, some word and grammatical errors may occur.    ADONIS Mcqueen CNP                  Thank you for allowing me to participate in the care of your patient.      Sincerely,     ADONIS Mcqueen CNP     [facility]  cc:   Yadi Clifton MD  [address]  [place] [address]

## 2024-11-26 ENCOUNTER — TRANSFERRED RECORDS (OUTPATIENT)
Dept: MULTI SPECIALTY CLINIC | Facility: CLINIC | Age: 44
End: 2024-11-26

## 2024-11-26 ENCOUNTER — OFFICE VISIT (OUTPATIENT)
Dept: FAMILY MEDICINE | Facility: CLINIC | Age: 44
End: 2024-11-26
Payer: COMMERCIAL

## 2024-11-26 VITALS
HEIGHT: 73 IN | TEMPERATURE: 97.9 F | DIASTOLIC BLOOD PRESSURE: 82 MMHG | BODY MASS INDEX: 41.75 KG/M2 | WEIGHT: 315 LBS | SYSTOLIC BLOOD PRESSURE: 132 MMHG | HEART RATE: 100 BPM | OXYGEN SATURATION: 94 % | RESPIRATION RATE: 20 BRPM

## 2024-11-26 DIAGNOSIS — R80.9 TYPE 2 DIABETES MELLITUS WITH MICROALBUMINURIA, WITHOUT LONG-TERM CURRENT USE OF INSULIN (H): Primary | ICD-10-CM

## 2024-11-26 DIAGNOSIS — E11.29 TYPE 2 DIABETES MELLITUS WITH MICROALBUMINURIA, WITHOUT LONG-TERM CURRENT USE OF INSULIN (H): Primary | ICD-10-CM

## 2024-11-26 DIAGNOSIS — I10 BENIGN ESSENTIAL HYPERTENSION: ICD-10-CM

## 2024-11-26 DIAGNOSIS — E66.01 MORBID OBESITY DUE TO EXCESS CALORIES (H): ICD-10-CM

## 2024-11-26 LAB
EST. AVERAGE GLUCOSE BLD GHB EST-MCNC: 183 MG/DL
HBA1C MFR BLD: 8 % (ref 0–5.6)
RETINOPATHY: NORMAL

## 2024-11-26 PROCEDURE — 36415 COLL VENOUS BLD VENIPUNCTURE: CPT | Performed by: PHYSICIAN ASSISTANT

## 2024-11-26 PROCEDURE — 83036 HEMOGLOBIN GLYCOSYLATED A1C: CPT | Performed by: PHYSICIAN ASSISTANT

## 2024-11-26 PROCEDURE — 99214 OFFICE O/P EST MOD 30 MIN: CPT | Performed by: PHYSICIAN ASSISTANT

## 2024-11-26 RX ORDER — TIRZEPATIDE 2.5 MG/.5ML
2.5 INJECTION, SOLUTION SUBCUTANEOUS
Qty: 2 ML | Refills: 0 | Status: SHIPPED | OUTPATIENT
Start: 2024-11-26

## 2024-11-26 RX ORDER — GLIPIZIDE 10 MG/1
20 TABLET, FILM COATED, EXTENDED RELEASE ORAL DAILY
Qty: 180 TABLET | Refills: 0 | Status: SHIPPED | OUTPATIENT
Start: 2024-11-26

## 2024-11-26 NOTE — PROGRESS NOTES
Ady Hale is a 44 year old, presenting for the following health issues:  Diabetes (recheck)        11/26/2024     1:12 PM   Additional Questions   Roomed by Ange Cunningham CMA   Accompanied by None         11/26/2024     1:12 PM   Patient Reported Additional Medications   Patient reports taking the following new medications none     Via the Health Maintenance questionnaire, the patient has reported the following services have been completed -Eye Exam: [facility] 2024-11-26, this information has been sent to the abstraction team.  History of Present Illness       Diabetes:   He presents for follow up of diabetes.  He is checking home blood glucose a few times a month.   He checks blood glucose before meals.  Blood glucose is sometimes over 200 and never under 70.  When his blood glucose is low, the patient is asymptomatic for confusion, blurred vision, lethargy and reports not feeling dizzy, shaky, or weak.   He has no concerns regarding his diabetes at this time.   He is not experiencing numbness or burning in feet, excessive thirst, blurry vision, weight changes or redness, sores or blisters on feet. The patient has had a diabetic eye exam in the last 12 months. Eye exam performed on 11/26/24. Location of last eye exam [facility].        He eats 2-3 servings of fruits and vegetables daily.He consumes 1 sweetened beverage(s) daily.He exercises with enough effort to increase his heart rate 10 to 19 minutes per day.  He exercises with enough effort to increase his heart rate 4 days per week.   He is taking medications regularly.     Blood pressure : stable.   Not checking sugars.    Watching diet some. Exercising more    No chest pain/sob/palpitations/dizziness/ha's  No neuropathy symptoms of vision changes.  Recheck of obesity. Discussed a lower calorie diet and consistent mix of aerobic exercise and strength training. This will help maintain/treat his blood pressure and  "sugars         Review of Systems  Constitutional, HEENT, cardiovascular, pulmonary, GI, , musculoskeletal, neuro, skin, endocrine and psych systems are negative, except as otherwise noted.      Objective    /82   Pulse 100   Temp 97.9  F (36.6  C) (Oral)   Resp 20   Ht 1.854 m (6' 0.99\")   Wt (!) 173 kg (381 lb 6.4 oz)   SpO2 94%   BMI 50.33 kg/m    Body mass index is 50.33 kg/m .  Physical Exam   Eye exam - right eye normal lid, conjunctiva, cornea, pupil and fundus, left eye normal lid, conjunctiva, cornea, pupil and fundus.  Thyroid not palpable, not enlarged, no nodules detected.  CHEST:chest clear to IPPA, no tachypnea, retractions or cyanosis, and S1, S2 normal, no murmur, no gallop, rate regular.  Geoffrey was seen today for diabetes.    Diagnoses and all orders for this visit:    Type 2 diabetes mellitus with microalbuminuria, without long-term current use of insulin (H)  -     Hemoglobin A1c; Future  -     Hemoglobin A1c  -     MOUNJARO 2.5 MG/0.5ML SOAJ; Inject 0.5 mLs (2.5 mg) subcutaneously every 7 days.  -     glipiZIDE (GLUCOTROL XL) 10 MG 24 hr tablet; Take 2 tablets (20 mg) by mouth daily.  -     metFORMIN (GLUCOPHAGE) 1000 MG tablet; TAKE 1 TABLET BY MOUTH TWICE DAILY WITH MEALS  -     empagliflozin (JARDIANCE) 25 MG TABS tablet; Take 1 tablet (25 mg) by mouth daily.    Morbid obesity due to excess calories (H)    Benign essential hypertension      More exercise  Will replace ozempic with mounjaro.   Recheck in 3-4 mos   Signed Electronically by: David Deutsch PA-C    "

## 2024-12-12 ENCOUNTER — PATIENT OUTREACH (OUTPATIENT)
Dept: CARE COORDINATION | Facility: CLINIC | Age: 44
End: 2024-12-12
Payer: COMMERCIAL

## 2024-12-26 ENCOUNTER — PATIENT OUTREACH (OUTPATIENT)
Dept: CARE COORDINATION | Facility: CLINIC | Age: 44
End: 2024-12-26
Payer: COMMERCIAL

## 2025-01-21 ENCOUNTER — ANCILLARY PROCEDURE (OUTPATIENT)
Dept: GENERAL RADIOLOGY | Facility: OTHER | Age: 45
End: 2025-01-21
Attending: CHIROPRACTOR
Payer: COMMERCIAL

## 2025-01-21 DIAGNOSIS — M99.03 SEGMENTAL DYSFUNCTION OF LUMBAR REGION: ICD-10-CM

## 2025-01-21 PROCEDURE — 72100 X-RAY EXAM L-S SPINE 2/3 VWS: CPT | Mod: TC | Performed by: RADIOLOGY

## 2025-02-04 DIAGNOSIS — R80.9 TYPE 2 DIABETES MELLITUS WITH MICROALBUMINURIA, WITHOUT LONG-TERM CURRENT USE OF INSULIN (H): ICD-10-CM

## 2025-02-04 DIAGNOSIS — E11.29 TYPE 2 DIABETES MELLITUS WITH MICROALBUMINURIA, WITHOUT LONG-TERM CURRENT USE OF INSULIN (H): ICD-10-CM

## 2025-02-05 RX ORDER — TIRZEPATIDE 5 MG/.5ML
INJECTION, SOLUTION SUBCUTANEOUS
Qty: 4 ML | Refills: 0 | Status: SHIPPED | OUTPATIENT
Start: 2025-02-05

## 2025-02-22 ENCOUNTER — HEALTH MAINTENANCE LETTER (OUTPATIENT)
Age: 45
End: 2025-02-22

## 2025-03-03 SDOH — HEALTH STABILITY: PHYSICAL HEALTH: ON AVERAGE, HOW MANY DAYS PER WEEK DO YOU ENGAGE IN MODERATE TO STRENUOUS EXERCISE (LIKE A BRISK WALK)?: 4 DAYS

## 2025-03-03 SDOH — HEALTH STABILITY: PHYSICAL HEALTH: ON AVERAGE, HOW MANY MINUTES DO YOU ENGAGE IN EXERCISE AT THIS LEVEL?: 30 MIN

## 2025-03-03 ASSESSMENT — SOCIAL DETERMINANTS OF HEALTH (SDOH): HOW OFTEN DO YOU GET TOGETHER WITH FRIENDS OR RELATIVES?: TWICE A WEEK

## 2025-03-06 ENCOUNTER — OFFICE VISIT (OUTPATIENT)
Dept: FAMILY MEDICINE | Facility: CLINIC | Age: 45
End: 2025-03-06
Payer: COMMERCIAL

## 2025-03-06 VITALS
BODY MASS INDEX: 41.75 KG/M2 | HEART RATE: 115 BPM | RESPIRATION RATE: 24 BRPM | WEIGHT: 315 LBS | SYSTOLIC BLOOD PRESSURE: 126 MMHG | HEIGHT: 73 IN | OXYGEN SATURATION: 95 % | TEMPERATURE: 98.4 F | DIASTOLIC BLOOD PRESSURE: 78 MMHG

## 2025-03-06 DIAGNOSIS — N18.2 CHRONIC KIDNEY DISEASE, STAGE 2 (MILD): ICD-10-CM

## 2025-03-06 DIAGNOSIS — E11.29 TYPE 2 DIABETES MELLITUS WITH MICROALBUMINURIA, WITHOUT LONG-TERM CURRENT USE OF INSULIN (H): ICD-10-CM

## 2025-03-06 DIAGNOSIS — I10 BENIGN ESSENTIAL HYPERTENSION: ICD-10-CM

## 2025-03-06 DIAGNOSIS — Z00.00 ROUTINE GENERAL MEDICAL EXAMINATION AT A HEALTH CARE FACILITY: Primary | ICD-10-CM

## 2025-03-06 DIAGNOSIS — R80.9 TYPE 2 DIABETES MELLITUS WITH MICROALBUMINURIA, WITHOUT LONG-TERM CURRENT USE OF INSULIN (H): ICD-10-CM

## 2025-03-06 LAB
CREAT UR-MCNC: 68.6 MG/DL
EST. AVERAGE GLUCOSE BLD GHB EST-MCNC: 220 MG/DL
HBA1C MFR BLD: 9.3 % (ref 0–5.6)
MICROALBUMIN UR-MCNC: 63.1 MG/L
MICROALBUMIN/CREAT UR: 91.98 MG/G CR (ref 0–17)

## 2025-03-06 RX ORDER — AMLODIPINE AND VALSARTAN 10; 160 MG/1; MG/1
1 TABLET ORAL DAILY
Qty: 90 TABLET | Refills: 1 | Status: SHIPPED | OUTPATIENT
Start: 2025-03-06

## 2025-03-06 RX ORDER — GLIPIZIDE 10 MG/1
20 TABLET, FILM COATED, EXTENDED RELEASE ORAL DAILY
Qty: 180 TABLET | Refills: 0 | Status: SHIPPED | OUTPATIENT
Start: 2025-03-06

## 2025-03-06 NOTE — PROGRESS NOTES
Preventive Care Visit  [facility] VANDANA Deutsch PA-C, Family Medicine  Mar 6, 2025      Assessment & Plan   Problem List Items Addressed This Visit          Endocrine    Type 2 diabetes mellitus with microalbuminuria, without long-term current use of insulin (H) (Chronic)    Relevant Medications    Tirzepatide (MOUNJARO) 7.5 MG/0.5ML SOAJ auto-injector pen    tirzepatide (MOUNJARO) 10 MG/0.5ML SOAJ auto-injector pen (Start on 4/3/2025)    tirzepatide (MOUNJARO) 12.5 MG/0.5ML SOAJ auto-injector pen (Start on 5/1/2025)    tirzepatide (MOUNJARO) 15 MG/0.5ML SOAJ auto-injector pen (Start on 5/29/2025)    glipiZIDE (GLUCOTROL XL) 10 MG 24 hr tablet    metFORMIN (GLUCOPHAGE) 1000 MG tablet    empagliflozin (JARDIANCE) 25 MG TABS tablet    Other Relevant Orders    Lipid panel reflex to direct LDL Non-fasting    FOOT EXAM (Completed)    Hemoglobin A1c (Completed)       Circulatory    Benign essential hypertension    Relevant Medications    amLODIPine-valsartan (EXFORGE)  MG tablet       Urinary    Chronic kidney disease, stage 2 (mild)    Relevant Orders    Albumin Random Urine Quantitative with Creat Ratio     Other Visit Diagnoses       Routine general medical examination at a health care facility    -  Primary    Body mass index (BMI) 50.0-59.9, adult (H)        Relevant Medications    Tirzepatide (MOUNJARO) 7.5 MG/0.5ML SOAJ auto-injector pen    tirzepatide (MOUNJARO) 10 MG/0.5ML SOAJ auto-injector pen (Start on 4/3/2025)    tirzepatide (MOUNJARO) 12.5 MG/0.5ML SOAJ auto-injector pen (Start on 5/1/2025)    tirzepatide (MOUNJARO) 15 MG/0.5ML SOAJ auto-injector pen (Start on 5/29/2025)    glipiZIDE (GLUCOTROL XL) 10 MG 24 hr tablet    metFORMIN (GLUCOPHAGE) 1000 MG tablet    empagliflozin (JARDIANCE) 25 MG TABS tablet           Titrate mounjaro from 5 to 15 mg  Continue to work on a lower sugar/starch diet and more exercise.  Recheck in 3-4 mos   Patient has been advised of split billing  "requirements and indicates understanding: Yes       BMI  Estimated body mass index is 50.25 kg/m  as calculated from the following:    Height as of this encounter: 1.854 m (6' 0.99\").    Weight as of this encounter: 172.7 kg (380 lb 12.8 oz).   Weight management plan: Discussed healthy diet and exercise guidelines    Counseling  Appropriate preventive services were addressed with this patient via screening, questionnaire, or discussion as appropriate for fall prevention, nutrition, physical activity, Tobacco-use cessation, social engagement, weight loss and cognition.  Checklist reviewing preventive services available has been given to the patient.  Reviewed patient's diet, addressing concerns and/or questions.   The patient was instructed to see the dentist every 6 months.     Work on weight loss  Regular exercise  Continue to titrate sejal Garsia   Geoffrey is a 44 year old, presenting for the following:  Physical and Health Maintenance (Patient is fasting/Patient declines vaccines today)        3/6/2025     9:32 AM   Additional Questions   Roomed by Ange Cunningham CMA   Accompanied by none         3/6/2025     9:32 AM   Patient Reported Additional Medications   Patient reports taking the following new medications none          HPI  Recheck of dm and htn and obesity.  No chest pain/sob/palpitations/dizziness/ha's     Advance Care Planning  Patient does not have a Health Care Directive: Discussed advance care planning with patient; however, patient declined at this time.      3/3/2025   General Health   How would you rate your overall physical health? (!) FAIR   Feel stress (tense, anxious, or unable to sleep) Not at all         3/3/2025   Nutrition   Three or more servings of calcium each day? Yes   Diet: Regular (no restrictions)   How many servings of fruit and vegetables per day? (!) 2-3   How many sweetened beverages each day? 0-1         3/3/2025   Exercise   Days per week of moderate/strenous " exercise 4 days   Average minutes spent exercising at this level 30 min         3/3/2025   Social Factors   Frequency of gathering with friends or relatives Twice a week   Worry food won't last until get money to buy more No   Food not last or not have enough money for food? No   Do you have housing? (Housing is defined as stable permanent housing and does not include staying ouside in a car, in a tent, in an abandoned building, in an overnight shelter, or couch-surfing.) Yes   Are you worried about losing your housing? No   Lack of transportation? No   Unable to get utilities (heat,electricity)? No         3/3/2025   Dental   Dentist two times every year? (!) NO            Today's PHQ-2 Score:       3/6/2025     9:11 AM   PHQ-2 ( 1999 [employer])   Q1: Little interest or pleasure in doing things 0   Q2: Feeling down, depressed or hopeless 0   PHQ-2 Score 0    Q1: Little interest or pleasure in doing things Not at all   Q2: Feeling down, depressed or hopeless Not at all   PHQ-2 Score 0       Patient-reported           3/3/2025   Substance Use   Alcohol more than 3/day or more than 7/wk No   Do you use any other substances recreationally? No     Social History     Tobacco Use    Smoking status: Never     Passive exposure: Never    Smokeless tobacco: Never   Vaping Use    Vaping status: Never Used   Substance Use Topics    Alcohol use: Yes     Comment: 2 times/ month    Drug use: No           3/3/2025   STI Screening   New sexual partner(s) since last STI/HIV test? No   ASCVD Risk   The ASCVD Risk score (David DOYLE, et al., 2019) failed to calculate for the following reasons:    The valid total cholesterol range is 130 to 320 mg/dL        3/3/2025   Contraception/Family Planning   Questions about contraception or family planning No        Reviewed and updated as needed this visit by Provider                    Past Medical History:   Diagnosis Date    Cellulitis-scrotum 08/15/2016    Diabetes mellitus, type 2 (H)      HTN (hypertension)     Hyperlipidemia LDL goal <100     NAFL (nonalcoholic fatty liver)     Pericardial effusion     2018 and 2023    SIRS (systemic inflammatory response syndrome) (H) 08/16/2016     Past Surgical History:   Procedure Laterality Date    CV PERICARDIOCENTESIS N/A 11/13/2023    Procedure: Pericardiocentesis;  Surgeon: Adan Coronel MD;  Location:  [facility]    HC REPAIR ING HERNIA,5+Y/O,REDUCIBL  Age 6     BP Readings from Last 3 Encounters:   03/06/25 126/78   11/26/24 132/82   10/11/24 134/84    Wt Readings from Last 3 Encounters:   03/06/25 (!) 172.7 kg (380 lb 12.8 oz)   11/26/24 (!) 173 kg (381 lb 6.4 oz)   10/11/24 (!) 174.6 kg (384 lb 14.4 oz)                  Patient Active Problem List   Diagnosis    Morbid obesity due to excess calories (H)    Benign essential hypertension    Hyperlipidemia LDL goal <100    Type 2 diabetes mellitus with microalbuminuria, without long-term current use of insulin (H)    Chronic kidney disease, stage 2 (mild)    Pneumonia    Pericardial effusion    Tachycardia     Past Surgical History:   Procedure Laterality Date    CV PERICARDIOCENTESIS N/A 11/13/2023    Procedure: Pericardiocentesis;  Surgeon: Adan Coronel MD;  Location:  [facility]    HC REPAIR ING HERNIA,5+Y/O,REDUCIBL  Age 6       Social History     Tobacco Use    Smoking status: Never     Passive exposure: Never    Smokeless tobacco: Never   Substance Use Topics    Alcohol use: Yes     Comment: 2 times/ month     Family History   Problem Relation Age of Onset    Hypertension Mother     Diabetes Father     Lipids Father     Heart Disease Brother     Leukemia Brother          Current Outpatient Medications   Medication Sig Dispense Refill    amLODIPine-valsartan (EXFORGE)  MG tablet Take 1 tablet by mouth daily. 90 tablet 1    empagliflozin (JARDIANCE) 25 MG TABS tablet Take 1 tablet (25 mg) by mouth daily. 90 tablet 0    glipiZIDE (GLUCOTROL XL) 10 MG  24 hr tablet Take 2 tablets (20 mg) by mouth daily. 180 tablet 0    metFORMIN (GLUCOPHAGE) 1000 MG tablet TAKE 1 TABLET BY MOUTH TWICE DAILY WITH MEALS 180 tablet 0    MOUNJARO 5 MG/0.5ML SOAJ INJECT 5MG SUBCUTANEOUSLY  ONCE A WEEK 4 mL 0    pantoprazole (PROTONIX) 40 MG EC tablet Take 1 tablet (40 mg) by mouth every morning (before breakfast) 30 tablet 0    rosuvastatin (CRESTOR) 20 MG tablet Take 1 tablet (20 mg) by mouth daily 90 tablet 3    [START ON 4/3/2025] tirzepatide (MOUNJARO) 10 MG/0.5ML SOAJ auto-injector pen Inject 0.5 mLs (10 mg) subcutaneously once a week. 2 mL 0    [START ON 5/1/2025] tirzepatide (MOUNJARO) 12.5 MG/0.5ML SOAJ auto-injector pen Inject 0.5 mLs (12.5 mg) subcutaneously once a week. 2 mL 0    [START ON 5/29/2025] tirzepatide (MOUNJARO) 15 MG/0.5ML SOAJ auto-injector pen Inject 0.5 mLs (15 mg) subcutaneously once a week. 2 mL 0    Tirzepatide (MOUNJARO) 7.5 MG/0.5ML SOAJ auto-injector pen Inject 0.5 mLs (7.5 mg) subcutaneously once a week. 2 mL 0     Allergies   Allergen Reactions    Lisinopril Cough    Penicillins Rash     Recent Labs   Lab Test 03/06/25  1021 11/26/24  1347 08/15/24  0915 04/26/24  1518 01/11/24  0856 11/25/23  0947 11/14/23  0618 04/12/23  0826 08/23/22  0915 02/18/22  1615 01/11/22  0729 01/19/21  1140 08/24/20  1048 01/31/20  1435 07/18/19  1609 02/04/19  1526 08/20/18  1659 05/11/18  1621   A1C 9.3* 8.0* 8.0*  --  7.6*  --   --    < > 9.5*   < > 9.2*   < > 6.5*   < > 5.8*  --    < > 9.4*   LDL  --   --   --   --  44  --   --   --  50  --  118*   < > 74  --   --   --    < >  --    HDL  --   --   --   --  36*  --   --   --  34*  --  40   < > 38*  --   --   --    < >  --    TRIG  --   --   --   --  192*  --   --   --  164*  --  305*   < > 125  --   --   --    < >  --    ALT  --   --   --  35  --  68 202*   < >  --   --   --   --   --   --   --   --   --   --    CR  --   --   --  1.03  --   --  0.77   < > 0.82  --   --    < > 1.02  --  1.14  --    < > 0.98  "  GFRESTIMATED  --   --   --  >90  --   --  >90   < > >90  --   --    < > >90  --  81  --    < > 85   GFRESTBLACK  --   --   --   --   --   --   --   --   --   --   --   --  >90  --  >90  --    < > >90   POTASSIUM  --   --   --  4.2  --   --  4.5   < > 4.2   < >  --    < > 4.2  --  3.9  --    < > 4.2   TSH  --   --   --   --   --   --   --   --   --   --   --   --   --   --   --  2.38  --  1.97    < > = values in this interval not displayed.               Review of Systems  Constitutional, HEENT, cardiovascular, pulmonary, GI, , musculoskeletal, neuro, skin, endocrine and psych systems are negative, except as otherwise noted.     Objective    Exam  /78   Pulse 115   Temp 98.4  F (36.9  C) (Oral)   Resp 24   Ht 1.854 m (6' 0.99\")   Wt (!) 172.7 kg (380 lb 12.8 oz)   SpO2 95%   BMI 50.25 kg/m     Estimated body mass index is 50.25 kg/m  as calculated from the following:    Height as of this encounter: 1.854 m (6' 0.99\").    Weight as of this encounter: 172.7 kg (380 lb 12.8 oz).    Physical Exam  GENERAL: alert and no distress  EYES: Eyes grossly normal to inspection, PERRL and conjunctivae and sclerae normal  HENT: ear canals and TM's normal, nose and mouth without ulcers or lesions  NECK: no adenopathy, no asymmetry, masses, or scars  RESP: lungs clear to auscultation - no rales, rhonchi or wheezes  CV: regular rate and rhythm, normal S1 S2, no S3 or S4, no murmur, click or rub, no peripheral edema  ABDOMEN: soft, nontender, no hepatosplenomegaly, no masses and bowel sounds normal  MS: no gross musculoskeletal defects noted, no edema  SKIN: no suspicious lesions or rashes  NEURO: Normal strength and tone, mentation intact and speech normal  PSYCH: mentation appears normal, affect normal/bright  Diabetic foot exam: normal DP and PT pulses, no trophic changes or ulcerative lesions, normal sensory exam, and normal monofilament exam        Signed Electronically by: David Deutsch PA-C    "

## 2025-03-06 NOTE — PATIENT INSTRUCTIONS
Patient Education   Preventive Care Advice   This is general advice given by our system to help you stay healthy. However, your care team may have specific advice just for you. Please talk to your care team about your preventive care needs.  Nutrition  Eat 5 or more servings of fruits and vegetables each day.  Try wheat bread, brown rice and whole grain pasta (instead of white bread, rice, and pasta).  Get enough calcium and vitamin D. Check the label on foods and aim for 100% of the RDA (recommended daily allowance).  Lifestyle  Exercise at least 150 minutes each week  (30 minutes a day, 5 days a week).  Do muscle strengthening activities 2 days a week. These help control your weight and prevent disease.  No smoking.  Wear sunscreen to prevent skin cancer.  Have a dental exam and cleaning every 6 months.  Yearly exams  See your health care team every year to talk about:  Any changes in your health.  Any medicines your care team has prescribed.  Preventive care, family planning, and ways to prevent chronic diseases.  Shots (vaccines)   HPV shots (up to age 26), if you've never had them before.  Hepatitis B shots (up to age 59), if you've never had them before.  COVID-19 shot: Get this shot when it's due.  Flu shot: Get a flu shot every year.  Tetanus shot: Get a tetanus shot every 10 years.  Pneumococcal, hepatitis A, and RSV shots: Ask your care team if you need these based on your risk.  Shingles shot (for age 50 and up)  General health tests  Diabetes screening:  Starting at age 35, Get screened for diabetes at least every 3 years.  If you are younger than age 35, ask your care team if you should be screened for diabetes.  Cholesterol test: At age 39, start having a cholesterol test every 5 years, or more often if advised.  Bone density scan (DEXA): At age 50, ask your care team if you should have this scan for osteoporosis (brittle bones).  Hepatitis C: Get tested at least once in your life.  STIs (sexually  transmitted infections)  Before age 24: Ask your care team if you should be screened for STIs.  After age 24: Get screened for STIs if you're at risk. You are at risk for STIs (including HIV) if:  You are sexually active with more than one person.  You don't use condoms every time.  You or a partner was diagnosed with a sexually transmitted infection.  If you are at risk for HIV, ask about PrEP medicine to prevent HIV.  Get tested for HIV at least once in your life, whether you are at risk for HIV or not.  Cancer screening tests  Cervical cancer screening: If you have a cervix, begin getting regular cervical cancer screening tests starting at age 21.  Breast cancer scan (mammogram): If you've ever had breasts, begin having regular mammograms starting at age 40. This is a scan to check for breast cancer.  Colon cancer screening: It is important to start screening for colon cancer at age 45.  Have a colonoscopy test every 10 years (or more often if you're at risk) Or, ask your provider about stool tests like a FIT test every year or Cologuard test every 3 years.  To learn more about your testing options, visit:   .  For help making a decision, visit:   https://bit.ly/kc50148.  Prostate cancer screening test: If you have a prostate, ask your care team if a prostate cancer screening test (PSA) at age 55 is right for you.  Lung cancer screening: If you are a current or former smoker ages 50 to 80, ask your care team if ongoing lung cancer screenings are right for you.  For informational purposes only. Not to replace the advice of your health care provider. Copyright   2023 [employer]. All rights reserved. Clinically reviewed by the [facility]itions Program. [employer] 421240 - REV 01/24.

## 2025-03-12 ENCOUNTER — HOSPITAL ENCOUNTER (OUTPATIENT)
Dept: CARDIOLOGY | Facility: CLINIC | Age: 45
Discharge: HOME | End: 2025-03-12
Attending: NURSE PRACTITIONER
Payer: COMMERCIAL

## 2025-03-12 DIAGNOSIS — I31.39 PERICARDIAL EFFUSION: ICD-10-CM

## 2025-03-12 LAB — LVEF ECHO: NORMAL

## 2025-03-12 PROCEDURE — 93306 TTE W/DOPPLER COMPLETE: CPT | Mod: 26 | Performed by: INTERNAL MEDICINE

## 2025-03-12 PROCEDURE — 93306 TTE W/DOPPLER COMPLETE: CPT

## 2025-03-19 ENCOUNTER — OFFICE VISIT (OUTPATIENT)
Dept: CARDIOLOGY | Facility: CLINIC | Age: 45
End: 2025-03-19
Attending: NURSE PRACTITIONER
Payer: COMMERCIAL

## 2025-03-19 VITALS
WEIGHT: 315 LBS | RESPIRATION RATE: 20 BRPM | HEART RATE: 97 BPM | OXYGEN SATURATION: 95 % | SYSTOLIC BLOOD PRESSURE: 157 MMHG | DIASTOLIC BLOOD PRESSURE: 82 MMHG | HEIGHT: 72 IN | BODY MASS INDEX: 42.66 KG/M2

## 2025-03-19 DIAGNOSIS — I31.39 PERICARDIAL EFFUSION: ICD-10-CM

## 2025-03-19 DIAGNOSIS — E78.5 HYPERLIPIDEMIA LDL GOAL <100: ICD-10-CM

## 2025-03-19 DIAGNOSIS — I10 BENIGN ESSENTIAL HYPERTENSION: ICD-10-CM

## 2025-03-19 RX ORDER — CARVEDILOL 6.25 MG/1
6.25 TABLET ORAL 2 TIMES DAILY WITH MEALS
Qty: 60 TABLET | Refills: 3 | Status: SHIPPED | OUTPATIENT
Start: 2025-03-19

## 2025-03-19 NOTE — PROGRESS NOTES
CARDIOLOGY CLINIC CONSULTATION    PRIMARY CARE PHYSICIAN:  David Deutsch    HISTORY OF PRESENT ILLNESS:  This is a delightful 44-year-old gentleman who initially saw me in consultation in the hospital in November 2023.  He is seeing me today in follow-up.  He has the following pertinent medical issues    Hypertension diabetes hyperlipidemia and nonalcoholic fatty liver disease  Severe obesity with BMI over 50  Presented with pneumonia in 2023 and was noted to have a large pericardial effusion on echo which was subsequently confirmed on MRI.  Unfortunately this effusion was initially not amenable to tap after discussion with our interventional team.  Since he was hemodynamically stable, this was medically managed.  He was treated with colchicine and ibuprofen and over the subsequent months his effusion has improved significantly.  Most recent echocardiography suggests small effusion.    Patient is here for follow-up.  He denies any sort of cardiac symptoms.  Blood pressure has been elevated today and even over the last year he has been in the prehypertensive range.  He has been started on weight loss drugs.  He is taking amlodipine valsartan Jardiance glipizide metformin and Mounjaro rosuvastatin.    PAST MEDICAL HISTORY:  Past Medical History:   Diagnosis Date    Cellulitis-scrotum 08/15/2016    Diabetes mellitus, type 2 (H)     HTN (hypertension)     Hyperlipidemia LDL goal <100     NAFL (nonalcoholic fatty liver)     Pericardial effusion     2018 and 2023    SIRS (systemic inflammatory response syndrome) (H) 08/16/2016       MEDICATIONS:  Current Outpatient Medications   Medication Sig Dispense Refill    amLODIPine-valsartan (EXFORGE)  MG tablet Take 1 tablet by mouth daily. 90 tablet 1    carvedilol (COREG) 6.25 MG tablet Take 1 tablet (6.25 mg) by mouth 2 times daily (with meals). 60 tablet 3    empagliflozin (JARDIANCE) 25 MG TABS tablet Take 1 tablet (25 mg) by mouth daily. 90 tablet 0    glipiZIDE  (GLUCOTROL XL) 10 MG 24 hr tablet Take 2 tablets (20 mg) by mouth daily. 180 tablet 0    metFORMIN (GLUCOPHAGE) 1000 MG tablet TAKE 1 TABLET BY MOUTH TWICE DAILY WITH MEALS 180 tablet 0    MOUNJARO 5 MG/0.5ML SOAJ INJECT 5MG SUBCUTANEOUSLY  ONCE A WEEK 4 mL 0    pantoprazole (PROTONIX) 40 MG EC tablet Take 1 tablet (40 mg) by mouth every morning (before breakfast) 30 tablet 0    rosuvastatin (CRESTOR) 20 MG tablet Take 1 tablet (20 mg) by mouth daily 90 tablet 3    Tirzepatide (MOUNJARO) 7.5 MG/0.5ML SOAJ auto-injector pen Inject 0.5 mLs (7.5 mg) subcutaneously once a week. 2 mL 0    [START ON 4/3/2025] tirzepatide (MOUNJARO) 10 MG/0.5ML SOAJ auto-injector pen Inject 0.5 mLs (10 mg) subcutaneously once a week. (Patient not taking: Reported on 3/19/2025) 2 mL 0    [START ON 5/1/2025] tirzepatide (MOUNJARO) 12.5 MG/0.5ML SOAJ auto-injector pen Inject 0.5 mLs (12.5 mg) subcutaneously once a week. (Patient not taking: Reported on 3/19/2025) 2 mL 0    [START ON 5/29/2025] tirzepatide (MOUNJARO) 15 MG/0.5ML SOAJ auto-injector pen Inject 0.5 mLs (15 mg) subcutaneously once a week. (Patient not taking: Reported on 3/19/2025) 2 mL 0     No current facility-administered medications for this visit.       SOCIAL HISTORY:  I have reviewed this patient's social history and updated it with pertinent information if needed. Geoffrey Wilson  reports that he has never smoked. He has never been exposed to tobacco smoke. He has never used smokeless tobacco. He reports current alcohol use. He reports that he does not use drugs.    PHYSICAL EXAM:  Pulse:  [97] 97  Resp:  [20] 20  BP: (150-157)/(82-90) 157/82  SpO2:  [95 %] 95 %  386 lbs 6.4 oz    Constitutional: alert, no distress  Respiratory: Good bilateral air entry  Cardiovascular: Normal regular heart sounds  GI: nondistended  Neuropsychiatric: appropriate affact    ASSESSMENT: Pertinent issues addressed/ reviewed during this cardiology visit  Pericardial effusion likely inflammatory,  significantly improved  Severe cardiovascular risk factors including hypertension hyperlipidemia obesity diabetes  Uncontrolled hypertension    RECOMMENDATIONS:  In regards to pericardial effusion, most recent echocardiogram suggest small effusion.  Recommend repeating an echocardiogram yearly for the next 1 or 2 years and if stable, we can stop checking this.  Again I told him to watch for symptoms.  His blood pressure is elevated and he has been prehypertensive even previously.  Continue amlodipine valsartan at current doses and start carvedilol 6.25 twice daily.  If home blood pressures are not in the 1 20-1 30 range systolic consistently, recommend increasing to 12.5 twice daily of carvedilol.  Healthy diet weight loss exercise strongly recommended.  Strength training will help.  Without cardiovascular risk factor control, he will remain at high risk of cardiovascular events.  Follow-up in 1 year or sooner if anything changes clinically.    It was a pleasure seeing this patient in clinic today. Please do not hesitate to contact me with any future questions.     BLANCA Hart, [facility]  Cardiology - [facility]  March 19, 2025    Review of the result(s) of each unique test - Last CBC BMP lipids echocardiogram     The level of medical decision making during this visit was of moderate complexity.    This note was completed in part using dictation via the Dragon voice recognition software. Some word and grammatical errors may occur and must be interpreted in the appropriate clinical context.  If there are any questions pertaining to this issue, please contact me for further clarification.

## 2025-03-19 NOTE — LETTER
3/19/2025    David Deutsch PA-C  [address]  [place] [address]    RE: Geoffrey Wilson       Dear Colleague,     I had the pleasure of seeing Geoffrey Wilson in the [facility].  CARDIOLOGY CLINIC CONSULTATION    PRIMARY CARE PHYSICIAN:  David Deutsch    HISTORY OF PRESENT ILLNESS:  This is a delightful 44-year-old gentleman who initially saw me in consultation in the hospital in November 2023.  He is seeing me today in follow-up.  He has the following pertinent medical issues    Hypertension diabetes hyperlipidemia and nonalcoholic fatty liver disease  Severe obesity with BMI over 50  Presented with pneumonia in 2023 and was noted to have a large pericardial effusion on echo which was subsequently confirmed on MRI.  Unfortunately this effusion was initially not amenable to tap after discussion with our interventional team.  Since he was hemodynamically stable, this was medically managed.  He was treated with colchicine and ibuprofen and over the subsequent months his effusion has improved significantly.  Most recent echocardiography suggests small effusion.    Patient is here for follow-up.  He denies any sort of cardiac symptoms.  Blood pressure has been elevated today and even over the last year he has been in the prehypertensive range.  He has been started on weight loss drugs.  He is taking amlodipine valsartan Jardiance glipizide metformin and Mounjaro rosuvastatin.    PAST MEDICAL HISTORY:  Past Medical History:   Diagnosis Date     Cellulitis-scrotum 08/15/2016     Diabetes mellitus, type 2 (H)      HTN (hypertension)      Hyperlipidemia LDL goal <100      NAFL (nonalcoholic fatty liver)      Pericardial effusion     2018 and 2023     SIRS (systemic inflammatory response syndrome) (H) 08/16/2016       MEDICATIONS:  Current Outpatient Medications   Medication Sig Dispense Refill     amLODIPine-valsartan (EXFORGE)  MG tablet Take 1 tablet by mouth daily. 90 tablet 1      carvedilol (COREG) 6.25 MG tablet Take 1 tablet (6.25 mg) by mouth 2 times daily (with meals). 60 tablet 3     empagliflozin (JARDIANCE) 25 MG TABS tablet Take 1 tablet (25 mg) by mouth daily. 90 tablet 0     glipiZIDE (GLUCOTROL XL) 10 MG 24 hr tablet Take 2 tablets (20 mg) by mouth daily. 180 tablet 0     metFORMIN (GLUCOPHAGE) 1000 MG tablet TAKE 1 TABLET BY MOUTH TWICE DAILY WITH MEALS 180 tablet 0     MOUNJARO 5 MG/0.5ML SOAJ INJECT 5MG SUBCUTANEOUSLY  ONCE A WEEK 4 mL 0     pantoprazole (PROTONIX) 40 MG EC tablet Take 1 tablet (40 mg) by mouth every morning (before breakfast) 30 tablet 0     rosuvastatin (CRESTOR) 20 MG tablet Take 1 tablet (20 mg) by mouth daily 90 tablet 3     Tirzepatide (MOUNJARO) 7.5 MG/0.5ML SOAJ auto-injector pen Inject 0.5 mLs (7.5 mg) subcutaneously once a week. 2 mL 0     [START ON 4/3/2025] tirzepatide (MOUNJARO) 10 MG/0.5ML SOAJ auto-injector pen Inject 0.5 mLs (10 mg) subcutaneously once a week. (Patient not taking: Reported on 3/19/2025) 2 mL 0     [START ON 5/1/2025] tirzepatide (MOUNJARO) 12.5 MG/0.5ML SOAJ auto-injector pen Inject 0.5 mLs (12.5 mg) subcutaneously once a week. (Patient not taking: Reported on 3/19/2025) 2 mL 0     [START ON 5/29/2025] tirzepatide (MOUNJARO) 15 MG/0.5ML SOAJ auto-injector pen Inject 0.5 mLs (15 mg) subcutaneously once a week. (Patient not taking: Reported on 3/19/2025) 2 mL 0     No current facility-administered medications for this visit.       SOCIAL HISTORY:  I have reviewed this patient's social history and updated it with pertinent information if needed. Geoffrey NIVIA [place]  reports that he has never smoked. He has never been exposed to tobacco smoke. He has never used smokeless tobacco. He reports current alcohol use. He reports that he does not use drugs.    PHYSICAL EXAM:  Pulse:  [97] 97  Resp:  [20] 20  BP: (150-157)/(82-90) 157/82  SpO2:  [95 %] 95 %  386 lbs 6.4 oz    Constitutional: alert, no distress  Respiratory: Good bilateral air  entry  Cardiovascular: Normal regular heart sounds  GI: nondistended  Neuropsychiatric: appropriate affact    ASSESSMENT: Pertinent issues addressed/ reviewed during this cardiology visit  Pericardial effusion likely inflammatory, significantly improved  Severe cardiovascular risk factors including hypertension hyperlipidemia obesity diabetes  Uncontrolled hypertension    RECOMMENDATIONS:  In regards to pericardial effusion, most recent echocardiogram suggest small effusion.  Recommend repeating an echocardiogram yearly for the next 1 or 2 years and if stable, we can stop checking this.  Again I told him to watch for symptoms.  His blood pressure is elevated and he has been prehypertensive even previously.  Continue amlodipine valsartan at current doses and start carvedilol 6.25 twice daily.  If home blood pressures are not in the 1 20-1 30 range systolic consistently, recommend increasing to 12.5 twice daily of carvedilol.  Healthy diet weight loss exercise strongly recommended.  Strength training will help.  Without cardiovascular risk factor control, he will remain at high risk of cardiovascular events.  Follow-up in 1 year or sooner if anything changes clinically.    It was a pleasure seeing this patient in clinic today. Please do not hesitate to contact me with any future questions.     BLANCA Hart, [facility]  Cardiology - [facility]  March 19, 2025    Review of the result(s) of each unique test - Last CBC BMP lipids echocardiogram     The level of medical decision making during this visit was of moderate complexity.    This note was completed in part using dictation via the Dragon voice recognition software. Some word and grammatical errors may occur and must be interpreted in the appropriate clinical context.  If there are any questions pertaining to this issue, please contact me for further clarification.      Thank you for allowing me to participate in the care of your patient.      Sincerely,     Yadi  MD Elodia     [facility]  cc:   Alfreda Agrawal, APRN CNP  [address] [place] [address]

## 2025-06-22 DIAGNOSIS — E11.29 TYPE 2 DIABETES MELLITUS WITH MICROALBUMINURIA, WITHOUT LONG-TERM CURRENT USE OF INSULIN (H): ICD-10-CM

## 2025-06-22 DIAGNOSIS — R80.9 TYPE 2 DIABETES MELLITUS WITH MICROALBUMINURIA, WITHOUT LONG-TERM CURRENT USE OF INSULIN (H): ICD-10-CM

## 2025-06-24 DIAGNOSIS — E11.29 TYPE 2 DIABETES MELLITUS WITH MICROALBUMINURIA, WITHOUT LONG-TERM CURRENT USE OF INSULIN (H): ICD-10-CM

## 2025-06-24 DIAGNOSIS — R80.9 TYPE 2 DIABETES MELLITUS WITH MICROALBUMINURIA, WITHOUT LONG-TERM CURRENT USE OF INSULIN (H): ICD-10-CM

## 2025-06-24 NOTE — TELEPHONE ENCOUNTER
Called the patient @   [phone]. Left a VM to schedule a diabetic check with [place]

## 2025-06-25 RX ORDER — GLIPIZIDE 10 MG/1
20 TABLET, FILM COATED, EXTENDED RELEASE ORAL DAILY
Qty: 60 TABLET | Refills: 0 | Status: SHIPPED | OUTPATIENT
Start: 2025-06-25

## 2025-06-30 ENCOUNTER — OFFICE VISIT (OUTPATIENT)
Dept: FAMILY MEDICINE | Facility: CLINIC | Age: 45
End: 2025-06-30
Payer: COMMERCIAL

## 2025-06-30 VITALS
WEIGHT: 315 LBS | BODY MASS INDEX: 41.75 KG/M2 | RESPIRATION RATE: 24 BRPM | HEART RATE: 116 BPM | OXYGEN SATURATION: 95 % | DIASTOLIC BLOOD PRESSURE: 83 MMHG | SYSTOLIC BLOOD PRESSURE: 127 MMHG | TEMPERATURE: 98.2 F | HEIGHT: 73 IN

## 2025-06-30 DIAGNOSIS — E11.29 TYPE 2 DIABETES MELLITUS WITH MICROALBUMINURIA, WITHOUT LONG-TERM CURRENT USE OF INSULIN (H): Primary | ICD-10-CM

## 2025-06-30 DIAGNOSIS — R80.9 TYPE 2 DIABETES MELLITUS WITH MICROALBUMINURIA, WITHOUT LONG-TERM CURRENT USE OF INSULIN (H): Primary | ICD-10-CM

## 2025-06-30 DIAGNOSIS — I10 BENIGN ESSENTIAL HYPERTENSION: ICD-10-CM

## 2025-06-30 DIAGNOSIS — I31.39 PERICARDIAL EFFUSION: ICD-10-CM

## 2025-06-30 DIAGNOSIS — Z29.9 PROPHYLACTIC MEASURE: ICD-10-CM

## 2025-06-30 DIAGNOSIS — N18.2 CHRONIC KIDNEY DISEASE, STAGE 2 (MILD): ICD-10-CM

## 2025-06-30 DIAGNOSIS — E78.5 HYPERLIPIDEMIA LDL GOAL <100: ICD-10-CM

## 2025-06-30 DIAGNOSIS — E66.813 CLASS 3 SEVERE OBESITY DUE TO EXCESS CALORIES WITH SERIOUS COMORBIDITY AND BODY MASS INDEX (BMI) OF 45.0 TO 49.9 IN ADULT (H): ICD-10-CM

## 2025-06-30 DIAGNOSIS — E66.01 MORBID OBESITY DUE TO EXCESS CALORIES (H): ICD-10-CM

## 2025-06-30 LAB
ANION GAP SERPL CALCULATED.3IONS-SCNC: 14 MMOL/L (ref 7–15)
BUN SERPL-MCNC: 17 MG/DL (ref 6–20)
CALCIUM SERPL-MCNC: 9.9 MG/DL (ref 8.8–10.4)
CHLORIDE SERPL-SCNC: 104 MMOL/L (ref 98–107)
CREAT SERPL-MCNC: 0.95 MG/DL (ref 0.67–1.17)
EGFRCR SERPLBLD CKD-EPI 2021: >90 ML/MIN/1.73M2
EST. AVERAGE GLUCOSE BLD GHB EST-MCNC: 197 MG/DL
GLUCOSE SERPL-MCNC: 265 MG/DL (ref 70–99)
HBA1C MFR BLD: 8.5 % (ref 0–5.6)
HCO3 SERPL-SCNC: 22 MMOL/L (ref 22–29)
POTASSIUM SERPL-SCNC: 4.4 MMOL/L (ref 3.4–5.3)
SODIUM SERPL-SCNC: 140 MMOL/L (ref 135–145)

## 2025-06-30 PROCEDURE — 83036 HEMOGLOBIN GLYCOSYLATED A1C: CPT | Performed by: PHYSICIAN ASSISTANT

## 2025-06-30 PROCEDURE — 36415 COLL VENOUS BLD VENIPUNCTURE: CPT | Performed by: PHYSICIAN ASSISTANT

## 2025-06-30 PROCEDURE — 80048 BASIC METABOLIC PNL TOTAL CA: CPT | Performed by: PHYSICIAN ASSISTANT

## 2025-06-30 PROCEDURE — 3052F HG A1C>EQUAL 8.0%<EQUAL 9.0%: CPT | Performed by: PHYSICIAN ASSISTANT

## 2025-06-30 PROCEDURE — G2211 COMPLEX E/M VISIT ADD ON: HCPCS | Performed by: PHYSICIAN ASSISTANT

## 2025-06-30 PROCEDURE — 3074F SYST BP LT 130 MM HG: CPT | Performed by: PHYSICIAN ASSISTANT

## 2025-06-30 PROCEDURE — 3079F DIAST BP 80-89 MM HG: CPT | Performed by: PHYSICIAN ASSISTANT

## 2025-06-30 PROCEDURE — 99214 OFFICE O/P EST MOD 30 MIN: CPT | Performed by: PHYSICIAN ASSISTANT

## 2025-06-30 RX ORDER — PANTOPRAZOLE SODIUM 40 MG/1
40 TABLET, DELAYED RELEASE ORAL
Qty: 90 TABLET | Refills: 1 | Status: SHIPPED | OUTPATIENT
Start: 2025-06-30

## 2025-06-30 RX ORDER — AMLODIPINE AND VALSARTAN 10; 160 MG/1; MG/1
1 TABLET ORAL DAILY
Qty: 90 TABLET | Refills: 1 | Status: SHIPPED | OUTPATIENT
Start: 2025-06-30

## 2025-06-30 RX ORDER — GLIPIZIDE 10 MG/1
20 TABLET, FILM COATED, EXTENDED RELEASE ORAL DAILY
Qty: 180 TABLET | Refills: 0 | Status: SHIPPED | OUTPATIENT
Start: 2025-06-30

## 2025-06-30 RX ORDER — ROSUVASTATIN CALCIUM 20 MG/1
20 TABLET, COATED ORAL DAILY
Qty: 90 TABLET | Refills: 3 | Status: SHIPPED | OUTPATIENT
Start: 2025-06-30

## 2025-06-30 RX ORDER — CARVEDILOL 6.25 MG/1
6.25 TABLET ORAL 2 TIMES DAILY WITH MEALS
Qty: 180 TABLET | Refills: 1 | Status: SHIPPED | OUTPATIENT
Start: 2025-06-30

## 2025-06-30 NOTE — PROGRESS NOTES
"    Ady Hale is a 44 year old, presenting for the following health issues:  Diabetes (Med check)        6/30/2025     8:43 AM   Additional Questions   Roomed by Ange Cunningham CMA   Accompanied by None         6/30/2025     8:43 AM   Patient Reported Additional Medications   Patient reports taking the following new medications none     History of Present Illness       Diabetes:   He presents for follow up of diabetes.  He is checking home blood glucose a few times a month.   He checks blood glucose at bedtime.  Blood glucose is sometimes over 200 and never under 70.  When his blood glucose is low, the patient is asymptomatic for confusion, blurred vision, lethargy and reports not feeling dizzy, shaky, or weak.   He has no concerns regarding his diabetes at this time.   He is not experiencing numbness or burning in feet, excessive thirst, blurry vision, weight changes or redness, sores or blisters on feet.           He eats 2-3 servings of fruits and vegetables daily.He consumes 1 sweetened beverage(s) daily.He exercises with enough effort to increase his heart rate 10 to 19 minutes per day.  He exercises with enough effort to increase his heart rate 3 or less days per week.   He is taking medications regularly.      Recheck of htn  Recheck of obesity. Discussed a lower calorie diet and consistent mix of aerobic exercise and strength training. This will help maintain/treat his blood pressure.  No chest pain/sob/palpitations/dizziness/ha's  Home blood pressure numbers looking good.  Not losing much weight on mounjaro.  Not checking sugars.       Review of Systems  Constitutional, HEENT, cardiovascular, pulmonary, GI, , musculoskeletal, neuro, skin, endocrine and psych systems are negative, except as otherwise noted.      Objective    /83   Pulse 116   Temp 98.2  F (36.8  C) (Oral)   Resp 24   Ht 1.854 m (6' 1\")   Wt (!) 177.9 kg (392 lb 3.2 oz)   SpO2 95%   BMI 51.74 kg/m    Body mass index " is 51.74 kg/m .  Physical Exam   Eye exam - right eye normal lid, conjunctiva, cornea, pupil and fundus, left eye normal lid, conjunctiva, cornea, pupil and fundus.  Thyroid not palpable, not enlarged, no nodules detected.  CHEST:chest clear to IPPA, no tachypnea, retractions or cyanosis, and S1, S2 normal, no murmur, no gallop, rate regular.  Foot exam - both sides normal; no swelling, tenderness or skin or vascular lesions. Color and temperature is normal. Sensation is intact. Peripheral pulses are palpable. Toenails are normal.        Geoffrey was seen today for diabetes.    Diagnoses and all orders for this visit:    Type 2 diabetes mellitus with microalbuminuria, without long-term current use of insulin (H)  -     BASIC METABOLIC PANEL; Future  -     Hemoglobin A1c; Future  -     BASIC METABOLIC PANEL  -     Hemoglobin A1c  -     tirzepatide (MOUNJARO) 15 MG/0.5ML SOAJ auto-injector pen; Inject 0.5 mLs (15 mg) subcutaneously once a week.  -     insulin glargine (LANTUS PEN) 100 UNIT/ML pen; Inject 20 Units subcutaneously at bedtime.  -     empagliflozin (JARDIANCE) 25 MG TABS tablet; Take 1 tablet (25 mg) by mouth daily.  -     metFORMIN (GLUCOPHAGE) 1000 MG tablet; Take 1 tablet (1,000 mg) by mouth 2 times daily (with meals).  -     glipiZIDE (GLUCOTROL XL) 10 MG 24 hr tablet; Take 2 tablets (20 mg) by mouth daily.    Chronic kidney disease, stage 2 (mild)  -     BASIC METABOLIC PANEL; Future  -     BASIC METABOLIC PANEL    Morbid obesity due to excess calories (H)    Class 3 severe obesity due to excess calories with serious comorbidity and body mass index (BMI) of 45.0 to 49.9 in adult (H)    Body mass index (BMI) 45.0-49.9, adult (H)    Body mass index (BMI) 50.0-59.9, adult (H)    Benign essential hypertension  -     BASIC METABOLIC PANEL; Future  -     BASIC METABOLIC PANEL  -     amLODIPine-valsartan (EXFORGE)  MG tablet; Take 1 tablet by mouth daily.    Prophylactic measure  -     pantoprazole  (PROTONIX) 40 MG EC tablet; Take 1 tablet (40 mg) by mouth every morning (before breakfast).    Hyperlipidemia LDL goal <100  -     rosuvastatin (CRESTOR) 20 MG tablet; Take 1 tablet (20 mg) by mouth daily.  -     carvedilol (COREG) 6.25 MG tablet; Take 1 tablet (6.25 mg) by mouth 2 times daily (with meals).    Pericardial effusion  -     carvedilol (COREG) 6.25 MG tablet; Take 1 tablet (6.25 mg) by mouth 2 times daily (with meals).      Inc mounjaro to 15 mg per week.  Start lantus 20 units at bedtime.  Continue to work on a lower sugar/starch diet and more exercise.  Lower fat, higher fiber diet and consistent exercise.  Recheck in 3 mos   Signed Electronically by: David Deutsch PA-C

## 2025-07-01 ENCOUNTER — TELEPHONE (OUTPATIENT)
Dept: FAMILY MEDICINE | Facility: CLINIC | Age: 45
End: 2025-07-01
Payer: COMMERCIAL

## 2025-07-01 NOTE — TELEPHONE ENCOUNTER
Prior Authorization Retail Medication Request    Medication/Dose: insulin glargine (LANTUS PEN) 100 UNIT/ML pen  Diagnosis and ICD code (if different than what is on RX):  Type 2 diabetes mellitus with microalbuminuria, without long-term current use of insulin (H) [E11.29, R80.9]  - Primary   New/renewal/insurance change PA/secondary ins. PA:  Previously Tried and Failed:    Rationale:      Insurance   Primary: [employer]  Insurance ID:  [id number]     Secondary (if applicable):  Insurance ID:      Pharmacy Information (if different than what is on RX)  Name:    Phone:    Fax:    Clinic Information  Preferred routing pool for dept communication:

## 2025-07-02 DIAGNOSIS — R80.9 TYPE 2 DIABETES MELLITUS WITH MICROALBUMINURIA, WITHOUT LONG-TERM CURRENT USE OF INSULIN (H): ICD-10-CM

## 2025-07-02 DIAGNOSIS — E11.29 TYPE 2 DIABETES MELLITUS WITH MICROALBUMINURIA, WITHOUT LONG-TERM CURRENT USE OF INSULIN (H): ICD-10-CM

## 2025-07-02 NOTE — TELEPHONE ENCOUNTER
I teed possible Rx for pen needles, routed to PCP to address both the insulin and the pen needles (not on active med list so RN unable to send).    Shanice AGRAWAL, GERBER  [facility] Triage

## 2025-07-02 NOTE — TELEPHONE ENCOUNTER
Prior Authorization Not Needed per Insurance    Medication: insulin glargine (LANTUS PEN) 100 UNIT/ML pen  Insurance Company: [employer] - Phone [phone] Fax [phone]  Expected CoPay:      Pharmacy Filling the Rx: [facility] [id number] [address]  Pharmacy Notified:  Yes  Patient Notified:  **Instructed pharmacy to notify patient when script is ready to /ship.**    Insurance covers the brand Lantus.

## 2025-08-14 ENCOUNTER — PATIENT OUTREACH (OUTPATIENT)
Dept: CARE COORDINATION | Facility: CLINIC | Age: 45
End: 2025-08-14
Payer: COMMERCIAL

## (undated) DEVICE — INTRODUCER CATH VASC 5FRX10CM  MPIS-501-NT-U-SST

## (undated) DEVICE — KIT DRAIN 6FR CATHETER PIGTAIL PERICARDIOCENTESIS PC101/A

## (undated) DEVICE — GUIDEWIRE 180CM .035IN VASC STR FLXB

## (undated) DEVICE — RAD INTRODUCER KIT MICRO 5FRX10CM .018 NITINOL G/W

## (undated) DEVICE — TOTE ANGIO CORP PC15AT SAN32CC83O

## (undated) RX ORDER — DIAZEPAM 5 MG
TABLET ORAL
Status: DISPENSED
Start: 2024-04-03

## (undated) RX ORDER — DIAZEPAM 5 MG
TABLET ORAL
Status: DISPENSED
Start: 2024-01-03

## (undated) RX ORDER — FENTANYL CITRATE 50 UG/ML
INJECTION, SOLUTION INTRAMUSCULAR; INTRAVENOUS
Status: DISPENSED
Start: 2023-11-13